# Patient Record
Sex: FEMALE | Race: WHITE | NOT HISPANIC OR LATINO | Employment: OTHER | URBAN - METROPOLITAN AREA
[De-identification: names, ages, dates, MRNs, and addresses within clinical notes are randomized per-mention and may not be internally consistent; named-entity substitution may affect disease eponyms.]

---

## 2019-12-19 ENCOUNTER — HOSPITAL ENCOUNTER (OUTPATIENT)
Dept: SURGERY | Facility: HOSPITAL | Age: 64
Setting detail: HOSPITAL OUTPATIENT SURGERY
Discharge: HOME | End: 2019-12-19
Attending: OPHTHALMOLOGY

## 2021-04-14 ENCOUNTER — HOSPITAL ENCOUNTER (OUTPATIENT)
Dept: LAB | Facility: HOSPITAL | Age: 66
Discharge: HOME | End: 2021-04-14
Attending: PHYSICIAN ASSISTANT

## 2021-04-14 ENCOUNTER — OFFICE VISIT CONVERTED (OUTPATIENT)
Dept: FAMILY MEDICINE CLINIC | Facility: CLINIC | Age: 66
End: 2021-04-14
Attending: PHYSICIAN ASSISTANT

## 2021-04-14 ENCOUNTER — CONVERSION ENCOUNTER (OUTPATIENT)
Dept: FAMILY MEDICINE CLINIC | Facility: CLINIC | Age: 66
End: 2021-04-14

## 2021-04-14 LAB
25(OH)D3 SERPL-MCNC: 28.4 NG/ML (ref 30–100)
ALBUMIN SERPL-MCNC: 4.4 G/DL (ref 3.5–5)
ALBUMIN/GLOB SERPL: 1.5 {RATIO} (ref 1.4–2.6)
ALP SERPL-CCNC: 63 U/L (ref 43–160)
ALT SERPL-CCNC: 19 U/L (ref 10–40)
ANION GAP SERPL CALC-SCNC: 17 MMOL/L (ref 8–19)
APPEARANCE UR: ABNORMAL
AST SERPL-CCNC: 26 U/L (ref 15–50)
BASOPHILS # BLD AUTO: 0.03 10*3/UL (ref 0–0.2)
BASOPHILS NFR BLD AUTO: 0.4 % (ref 0–3)
BILIRUB SERPL-MCNC: 0.36 MG/DL (ref 0.2–1.3)
BILIRUB UR QL: NEGATIVE
BUN SERPL-MCNC: 17 MG/DL (ref 5–25)
BUN/CREAT SERPL: 18 {RATIO} (ref 6–20)
CALCIUM SERPL-MCNC: 9 MG/DL (ref 8.7–10.4)
CHLORIDE SERPL-SCNC: 102 MMOL/L (ref 99–111)
CHOLEST SERPL-MCNC: 153 MG/DL (ref 107–200)
CHOLEST/HDLC SERPL: 2.6 {RATIO} (ref 3–6)
COLOR UR: YELLOW
CONV ABS IMM GRAN: 0.05 10*3/UL (ref 0–0.2)
CONV BACTERIA: ABNORMAL
CONV CO2: 25 MMOL/L (ref 22–32)
CONV COLLECTION SOURCE (UA): ABNORMAL
CONV HYALINE CASTS IN URINE MICRO: ABNORMAL /[LPF]
CONV IMMATURE GRAN: 0.6 % (ref 0–1.8)
CONV TOTAL PROTEIN: 7.4 G/DL (ref 6.3–8.2)
CONV UROBILINOGEN IN URINE BY AUTOMATED TEST STRIP: 0.2 {EHRLICHU}/DL (ref 0.1–1)
CREAT UR-MCNC: 0.96 MG/DL (ref 0.5–0.9)
DEPRECATED RDW RBC AUTO: 45.3 FL (ref 36.4–46.3)
EOSINOPHIL # BLD AUTO: 0.05 10*3/UL (ref 0–0.7)
EOSINOPHIL # BLD AUTO: 0.6 % (ref 0–7)
ERYTHROCYTE [DISTWIDTH] IN BLOOD BY AUTOMATED COUNT: 15 % (ref 11.7–14.4)
EST. AVERAGE GLUCOSE BLD GHB EST-MCNC: 111 MG/DL
FOLATE SERPL-MCNC: >20 NG/ML (ref 4.8–20)
GFR SERPLBLD BASED ON 1.73 SQ M-ARVRAT: >60 ML/MIN/{1.73_M2}
GLOBULIN UR ELPH-MCNC: 3 G/DL (ref 2–3.5)
GLUCOSE SERPL-MCNC: 103 MG/DL (ref 65–99)
GLUCOSE UR QL: NEGATIVE MG/DL
HBA1C MFR BLD: 5.5 % (ref 3.5–5.7)
HCT VFR BLD AUTO: 39 % (ref 37–47)
HDLC SERPL-MCNC: 60 MG/DL (ref 40–60)
HGB BLD-MCNC: 11.7 G/DL (ref 12–16)
HGB UR QL STRIP: NEGATIVE
IRON SATN MFR SERPL: 13 % (ref 20–55)
IRON SERPL-MCNC: 50 UG/DL (ref 60–170)
KETONES UR QL STRIP: NEGATIVE MG/DL
LDLC SERPL CALC-MCNC: 78 MG/DL (ref 70–100)
LEUKOCYTE ESTERASE UR QL STRIP: ABNORMAL
LYMPHOCYTES # BLD AUTO: 1.83 10*3/UL (ref 1–5)
LYMPHOCYTES NFR BLD AUTO: 23.3 % (ref 20–45)
MCH RBC QN AUTO: 24.8 PG (ref 27–31)
MCHC RBC AUTO-ENTMCNC: 30 G/DL (ref 33–37)
MCV RBC AUTO: 82.6 FL (ref 81–99)
MONOCYTES # BLD AUTO: 0.5 10*3/UL (ref 0.2–1.2)
MONOCYTES NFR BLD AUTO: 6.4 % (ref 3–10)
NEUTROPHILS # BLD AUTO: 5.39 10*3/UL (ref 2–8)
NEUTROPHILS NFR BLD AUTO: 68.7 % (ref 30–85)
NITRITE UR QL STRIP: POSITIVE
NRBC CBCN: 0 % (ref 0–0.7)
OSMOLALITY SERPL CALC.SUM OF ELEC: 290 MOSM/KG (ref 273–304)
PH UR STRIP.AUTO: 6 [PH] (ref 5–8)
PLATELET # BLD AUTO: 255 10*3/UL (ref 130–400)
PMV BLD AUTO: 11.1 FL (ref 9.4–12.3)
POTASSIUM SERPL-SCNC: 4.5 MMOL/L (ref 3.5–5.3)
PROT UR QL: NEGATIVE MG/DL
RBC # BLD AUTO: 4.72 10*6/UL (ref 4.2–5.4)
RBC #/AREA URNS HPF: ABNORMAL /[HPF]
SODIUM SERPL-SCNC: 139 MMOL/L (ref 135–147)
SP GR UR: 1.02 (ref 1–1.03)
T4 FREE SERPL-MCNC: 1.4 NG/DL (ref 0.9–1.8)
TIBC SERPL-MCNC: 383 UG/DL (ref 245–450)
TRANSFERRIN SERPL-MCNC: 268 MG/DL (ref 250–380)
TRIGL SERPL-MCNC: 73 MG/DL (ref 40–150)
TSH SERPL-ACNC: 1.54 M[IU]/L (ref 0.27–4.2)
VIT B12 SERPL-MCNC: 380 PG/ML (ref 211–911)
VLDLC SERPL-MCNC: 15 MG/DL (ref 5–37)
WBC # BLD AUTO: 7.85 10*3/UL (ref 4.8–10.8)
WBC #/AREA URNS HPF: ABNORMAL /[HPF]

## 2021-04-17 LAB
AMPICILLIN SUSC ISLT: <=2
AMPICILLIN+SULBAC SUSC ISLT: <=2
BACTERIA UR CULT: ABNORMAL
CEFAZOLIN SUSC ISLT: <=4
CEFEPIME SUSC ISLT: <=0.12
CEFTAZIDIME SUSC ISLT: <=1
CEFTRIAXONE SUSC ISLT: <=0.25
CIPROFLOXACIN SUSC ISLT: <=0.25
ERTAPENEM SUSC ISLT: <=0.12
GENTAMICIN SUSC ISLT: <=1
LEVOFLOXACIN SUSC ISLT: <=0.12
NITROFURANTOIN SUSC ISLT: <=16
PIP+TAZO SUSC ISLT: <=4
TMP SMX SUSC ISLT: <=20
TOBRAMYCIN SUSC ISLT: <=1

## 2021-05-11 NOTE — H&P
History and Physical      Patient Name: Veda Peña   Patient ID: [id number]   Sex: Female   YOB: 1955    Primary Care Provider: Lina MACK   Referring Provider: Lina MACK    Visit Date: April 14, 2021    Provider: FREDERICK Mallory   Location: [facility]   Location Address: [address] [place] [address], [address]  [place], [place]  [id number]   Location Phone: [phone]          Chief Complaint  · New Patient-Establish Care       History Of Present Illness  Veda Peña is a 65 year old female who presents for evaluation and treatment of:      New Patient to establish care, previous PCP Dr Prince Eddy     HL: She is taking Simvastatin with good results. She denies leg cramps/pain and muscle weakness     Hypothyroidism: She is taking Levothyroxine with good results. She states her energy level is low and her sleep is not solid, she wakes up every 2-3hrs.     COPD: She is currently using Albuterol Inhaler and states she mainly uses it in the summer. She does not see pulmonology     Neuropathy: She states she has it all over and states her Gabapentin and Hydrocodone helps. She will need referral to Pain Management.: pt requesting Dr. Lambert in [facility] [phone]. She states her PCP managed it before.     GERD: She is  taking Omeprazole with good results     CKD: pt unsure of level; no nephrologist.    MMG: [facility] 10/2020  Pap: about [facility]; s/p total hysterectomy secondary to fibroids; no h/o gyn cancer  CLN:2019 [place];   Last labs: 11/2020; last lab with elevated glucose of 137; pt monitoring diet.    PHQ-9: 8 with fatigue and weight gain noted. Pt had COVID in 2/2020 and states fatigue since; no hospitalization.       Past Medical History  Disease Name Date Onset Notes   COPD (chronic obstructive pulmonary disease) --  --    GERD (gastroesophageal reflux disease) --  --    Hyperlipidemia --  --     Hypothyroidism --  --    Neuropathic pain --  --          Past Surgical History  Procedure Name Date Notes   Cataract surgery of both eyes --  --    Gallbladder --  --    Hysterectomy (abdominal) --  --    Tubal ligation --  --          Medication List  Name Date Started Instructions   albuterol sulfate 90 mcg/actuation inhalation HFA aerosol inhaler  inhale 1 puff (90 mcg) by inhalation route every 6 hours as needed   gabapentin 300 mg oral capsule  take 1 capsule by oral route 2 times a day   hydrocodone-acetaminophen  mg oral tablet  take 2.5 tablets by oral route daily   levothyroxine 50 mcg oral capsule  take 1 capsule (50 mcg) by oral route once daily on an empty stomach 30 minutes before breakfast   omeprazole 20 mg oral capsule,delayed release(DR/EC)  take 1 capsule (20 mg) by oral route once daily before a meal   oxybutynin chloride 10 mg oral tablet extended release 24hr  take 2 tablets (20 mg) by oral route once daily   simvastatin 20 mg oral tablet  take 1 tablet (20 mg) by oral route once daily in the evening         Allergy List  Allergen Name Date Reaction Notes   Codeine Sulfate --  --  --    Valium --  --  --    venlafaxine --  --  --        Allergies Reconciled  Social History  Finding Status Start/Stop Quantity Notes   Tobacco Former --/-- --  44yrs 3/4PPD         Immunizations  NameDate Admin Mfg Trade Name Lot Number Route Inj VIS Given VIS Publication   InfluenzaRefused 04/14/2021 NE Not Entered  NE NE     Comments:          Review of Systems  · Constitutional  o Admits  o : fatigue, weight gain  o Denies  o : night sweats  · Eyes  o Denies  o : double vision, blurred vision  · HENT  o Denies  o : vertigo, recent head injury  · Breasts  o Denies  o : abnormal changes in breast size, additional breast symptoms except as noted in the HPI  · Cardiovascular  o Denies  o : chest pain, irregular heart beats  · Respiratory  o Denies  o : shortness of breath, productive  "cough  · Gastrointestinal  o Denies  o : nausea, vomiting  · Genitourinary  o Denies  o : dysuria, urinary retention  · Integument  o Denies  o : hair growth change, new skin lesions  · Neurologic  o Denies  o : altered mental status, seizures  · Musculoskeletal  o Denies  o : joint swelling, limitation of motion  · Endocrine  o Denies  o : cold intolerance, heat intolerance  · Heme-Lymph  o Denies  o : petechiae, lymph node enlargement or tenderness  · Allergic-Immunologic  o Denies  o : frequent illnesses      Vitals  Date Time BP Position Site L\R Cuff Size HR RR TEMP (F) WT  HT  BMI kg/m2 BSA m2 O2 Sat FR L/min FiO2 HC       04/14/2021 09:14 /58 Sitting    64 - R  97.7 146lbs 16oz 4'  10.5\" 30.2 1.66 97 %  21%          Physical Examination  · Constitutional  o Appearance  o : well developed, well-nourished, no acute distress  · Head and Face  o Head  o : normocephalic, atraumatic  · Neck  o Inspection/Palpation  o : normal appearance, no masses or tenderness, trachea midline  o Thyroid  o : gland size normal, nontender, no nodules or masses present on palpation  · Respiratory  o Respiratory Effort  o : breathing unlabored  o Inspection of Chest  o : chest rise symmetric bilaterally  o Auscultation of Lungs  o : clear to auscultation bilaterally throughout inspiration and expiration  · Cardiovascular  o Heart  o :   § Auscultation of Heart  § : regular rate and rhythm, no murmurs, gallops or rubs  o Peripheral Vascular System  o :   § Extremities  § : no edema  · Lymphatic  o Neck  o : no cervical lymphadenopathy, no supraclavicular lymphadenopathy  · Psychiatric  o Mood and Affect  o : mood normal, affect appropriate          Assessment  · Screening for depression     V79.0/Z13.89  · COPD (chronic obstructive pulmonary disease)     496/J44.9  · Fatigue     780.79/R53.83  · GERD (gastroesophageal reflux " disease)     530.81/K21.9  · Hyperlipidemia     272.4/E78.5  · Hypothyroidism     244.9/E03.9  · Establishing care with new doctor, encounter for     V65.8/Z76.89  · Patient receiving medication management services from refill clinic     V68.1/Z76.0  · History of 2019 novel coronavirus disease (COVID-19)     V12.09/Z86.16  · Elevated glucose     790.29/R73.09  · CKD (chronic kidney disease)     585.9/N18.9  · Neuropathy     355.9/G62.9  · Chronic pain     338.29/G89.29       Pt's chronic medical conditions are stable pending labs. Will order labs today. No refills needed per pt. Will obtain records for CLN and MMG. F/u 6mths. Will refer to Dr. Lambert in [place] for pain mgt.     Problems Reconciled  Plan  · Orders  o ACO-18: Positive screen for clinical depression using a standardized tool and a follow-up plan documented () - V79.0/Z13.89 - 04/14/2021   Checking labs for fatigue  o Iron panel (iron, TIBC, transferrin saturation) (34766, 68608, 50762) - 780.79/R53.83 - 04/14/2021  o B12 Folate levels (B12FO) - 780.79/R53.83 - 04/14/2021  o HTN/Lipid Panel (CMP, Lipid) [facility] (82170, 90421) - 272.4/E78.5 - 04/14/2021  o Thyroid Profile (THYII, 78462, 75359) - 244.9/E03.9 - 04/14/2021  o CBC with Auto Diff [facility] (75882) - 585.9/N18.9, 780.79/R53.83 - 04/14/2021  o Hgb A1c [facility] (23235) - 790.29/R73.09 - 04/14/2021  o Urinalysis with Reflex Microscopy ([facility]) (76305) - 585.9/N18.9 - 04/14/2021  o Vitamin D (25-Hydroxy) Level (50005) - 585.9/N18.9 - 04/14/2021  o ACO-14: Influenza immunization was not administered for reasons documented [facility] () - - 04/14/2021  o ACO-39: Current medications updated and reviewed (1159F, ) - - 04/14/2021  o PAIN MANAGEMENT CONSULTATION (PAINM) - 338.29/G89.29 - 04/14/2021   Dr Lambert in [place].  · Medications  o Medications have been Reconciled  o Transition of Care or Provider Policy  · Instructions  o Depression Screen completed and scanned into the EMR under the designated folder  within the patient's documents.  o Today's PHQ-9 result is 8  o Advised that cheeses and other sources of dairy fats, animal fats, fast food, and the extras (candy, pastries, pies, doughnuts and cookies) all contain LDL raising nutrients. Advised to increase fruits, vegetables, whole grains, and to monitor portion sizes.   o Patient is taking medications as prescribed and doing well.   o Patient was educated/instructed on their diagnosis, treatment and medications prior to discharge from the clinic today.  o Call the office with any concerns or questions.  o Chronic conditions reviewed and taken into consideration for today's treatment plan.  o Electronically Identified Patient Education Materials Provided Electronically  · Disposition  o Follow Up in 6 months.            Electronically Signed by: FREDERICK Mallory -Author on April 14, 2021 10:05:20 AM

## 2021-05-14 VITALS
SYSTOLIC BLOOD PRESSURE: 109 MMHG | OXYGEN SATURATION: 97 % | BODY MASS INDEX: 30.86 KG/M2 | HEIGHT: 58 IN | TEMPERATURE: 97.7 F | HEART RATE: 64 BPM | WEIGHT: 147 LBS | DIASTOLIC BLOOD PRESSURE: 58 MMHG

## 2021-05-20 ENCOUNTER — OFFICE VISIT CONVERTED (OUTPATIENT)
Dept: FAMILY MEDICINE CLINIC | Facility: CLINIC | Age: 66
End: 2021-05-20
Attending: PHYSICIAN ASSISTANT

## 2021-06-05 NOTE — PROGRESS NOTES
Progress Note      Patient Name: Veda Peña   Patient ID: [id number]   Sex: Female   YOB: 1955    Primary Care Provider: Lina MACK   Referring Provider: Lina MACK    Visit Date: May 20, 2021    Provider: FREDERICK Mallory   Location: [facility]   Location Address: [address] [place] [address], [address]  [place], [place]  [id number]   Location Phone: [phone]          Chief Complaint  · Right Knee Pain   · Follow up ER[facility]       History Of Present Illness  Veda Peña is a 65 year old female who presents for evaluation and treatment of:      Patient is here for follow up ER [facility] Sunday after bending down Saturday at home and heard a popping noise. She states it was fine until she woke up Sunday morning and it was swollen and painful. She was advised it was a possible torn ACL and to follow up with PCP. She has been doing rest, ice and wearing a brace, soaking in Epsom salt without relief of sx. Xray were done. Previous surgeries on right knee with meniscus repair; [facility], Dr Richey at [facility] did other surgery; last surgery about 15 years ago. Still with pain today. Went to Dr. Lambert in [facility] gave Tramadol and patient got nausea and vomiting with the medication. Per patient, he won't treat differently without consent of EMG and pt declines stating that she has had that before and it is painful and she declines to have test repeat. Doesn't want to go to Pain Mgt in [place].           Past Medical History  Disease Name Date Onset Notes   CKD (chronic kidney disease) 04/14/2021 --    COPD (chronic obstructive pulmonary disease) --  --    Elevated glucose 04/14/2021 --    GERD (gastroesophageal reflux disease) --  --    History of 2019 novel coronavirus disease (COVID-19) 04/14/2021 --    Hyperlipidemia --  --    Hypothyroidism --  --    Neuropathic pain --  --    Neuropathy 04/14/2021 --          Past Surgical History  Procedure  "Name Date Notes   Cataract surgery of both eyes --  --    Gallbladder --  --    Hysterectomy (abdominal) --  --    Tubal ligation --  --          Medication List  Name Date Started Instructions   albuterol sulfate 90 mcg/actuation inhalation HFA aerosol inhaler  inhale 1 puff (90 mcg) by inhalation route every 6 hours as needed   gabapentin 300 mg oral capsule  take 1 capsule by oral route 2 times a day   levothyroxine 50 mcg oral capsule  take 1 capsule (50 mcg) by oral route once daily on an empty stomach 30 minutes before breakfast   omeprazole 20 mg oral capsule,delayed release(DR/EC)  take 1 capsule (20 mg) by oral route once daily before a meal   oxybutynin chloride 10 mg oral tablet extended release 24hr  take 2 tablets (20 mg) by oral route once daily   simvastatin 20 mg oral tablet  take 1 tablet (20 mg) by oral route once daily in the evening         Allergy List  Allergen Name Date Reaction Notes   Codeine Sulfate --  --  --    Valium --  --  --    venlafaxine --  --  --        Allergies Reconciled  Social History  Finding Status Start/Stop Quantity Notes   Tobacco Former --/-- --  44yrs 3/4PPD         Immunizations  NameDate Admin Mfg Trade Name Lot Number Route Inj VIS Given VIS Publication   InfluenzaRefused 04/14/2021 NE Not Entered  NE NE     Comments:          Review of Systems  · Constitutional  o Denies  o : fever, fatigue, weight loss, weight gain  · Cardiovascular  o Denies  o : lower extremity edema, claudication, chest pressure, palpitations  · Respiratory  o Denies  o : shortness of breath, wheezing, cough, hemoptysis, dyspnea on exertion  · Gastrointestinal  o Denies  o : nausea, vomiting, diarrhea, constipation, abdominal pain  · Musculoskeletal  o Admits  o : joint pain, knee pain      Vitals  Date Time BP Position Site L\R Cuff Size HR RR TEMP (F) WT  HT  BMI kg/m2 BSA m2 O2 Sat FR L/min FiO2 HC       05/20/2021 11:57 /78 Sitting    65 - R  97.8 146lbs 4oz 4'  10.5\" 30.05 1.65 95 %  " 21%          Physical Examination  · Constitutional  o Appearance  o : well developed, well-nourished, no acute distress  · Head and Face  o Head  o : normocephalic, atraumatic  · Neck  o Inspection/Palpation  o : normal appearance, no masses or tenderness, trachea midline  o Thyroid  o : gland size normal, nontender, no nodules or masses present on palpation  · Respiratory  o Respiratory Effort  o : breathing unlabored  o Inspection of Chest  o : chest rise symmetric bilaterally  o Auscultation of Lungs  o : clear to auscultation bilaterally throughout inspiration and expiration  · Cardiovascular  o Heart  o :   § Auscultation of Heart  § : regular rate and rhythm, no murmurs, gallops or rubs  o Peripheral Vascular System  o :   § Extremities  § : no edema  · Lymphatic  o Neck  o : no cervical lymphadenopathy, no supraclavicular lymphadenopathy  · Psychiatric  o Mood and Affect  o : mood normal, affect appropriate  · Right Knee  o Inspection  o : no redness, swelling present   o Palpation  o : patellar tendon tenderness present, crepitus present   o Range of Motion  o : flexion normal 120 degrees, extension normal 0-5 degrees  o Special Tests  o : Anterior Drawer Test negative, Posterior Drawer Test negative, Varus Laxiety positive   o Neurovascular  o : neurovascularly intact              Assessment  · Chronic pain     338.29/G89.29  · Knee pain, right     719.46/M25.561  · Knee swelling     719.06/M25.469       Continue with brace and topical measures for knee; obtain MRI.  Referral to Pain Management for continued medication management.     Problems Reconciled  Plan  · Orders  o ACO-39: Current medications updated and reviewed (1159F, ) - - 05/20/2021  o PAIN MANAGEMENT CONSULTATION (PAINM) - 338.29/G89.29 - 05/20/2021   [facility]  o MRI knee right wo contrast (78864) - 719.46/M25.561, 719.06/M25.469 - 05/20/2021  · Medications  o Macrobid 100 mg oral capsule   SIG: take 1 capsule (100 mg) by oral route  every 12 hours with food for 7 days   DISP: (14) Capsule with 0 refills  Discontinued on 05/20/2021     o Medications have been Reconciled  o Transition of Care or Provider Policy  · Instructions  o Take all medications as prescribed/directed.  o Patient was educated/instructed on their diagnosis, treatment and medications prior to discharge from the clinic today.  o Call the office with any concerns or questions.  o Chronic conditions reviewed and taken into consideration for today's treatment plan.  o Electronically Identified Patient Education Materials Provided Electronically  · Disposition  o Call or Return if symptoms worsen or persist.  o Follow Up PRN.            Electronically Signed by: FREDERICK Mallory -Author on May 20, 2021 12:46:59 PM

## 2021-06-09 ENCOUNTER — TRANSCRIBE ORDERS (OUTPATIENT)
Dept: GENERAL RADIOLOGY | Facility: HOSPITAL | Age: 66
End: 2021-06-09

## 2021-06-09 ENCOUNTER — HOSPITAL ENCOUNTER (OUTPATIENT)
Dept: GENERAL RADIOLOGY | Facility: HOSPITAL | Age: 66
Discharge: HOME | End: 2021-06-09
Admitting: PHYSICIAN ASSISTANT

## 2021-06-09 DIAGNOSIS — M50.30 DEGENERATION OF CERVICAL INTERVERTEBRAL DISC: Primary | ICD-10-CM

## 2021-06-09 DIAGNOSIS — M50.30 DEGENERATION OF CERVICAL INTERVERTEBRAL DISC: ICD-10-CM

## 2021-06-09 PROCEDURE — 72040 X-RAY EXAM NECK SPINE 2-3 VW: CPT

## 2021-06-10 ENCOUNTER — TELEPHONE (OUTPATIENT)
Dept: FAMILY MEDICINE CLINIC | Facility: CLINIC | Age: 66
End: 2021-06-10

## 2021-06-11 ENCOUNTER — OFFICE VISIT (OUTPATIENT)
Dept: FAMILY MEDICINE CLINIC | Facility: CLINIC | Age: 66
End: 2021-06-11

## 2021-06-11 VITALS
DIASTOLIC BLOOD PRESSURE: 78 MMHG | SYSTOLIC BLOOD PRESSURE: 140 MMHG | HEART RATE: 61 BPM | HEIGHT: 58 IN | OXYGEN SATURATION: 99 % | BODY MASS INDEX: 31.53 KG/M2 | WEIGHT: 150.2 LBS

## 2021-06-11 DIAGNOSIS — N30.01 ACUTE CYSTITIS WITH HEMATURIA: ICD-10-CM

## 2021-06-11 DIAGNOSIS — E61.1 IRON DEFICIENCY: ICD-10-CM

## 2021-06-11 DIAGNOSIS — R35.0 URINARY FREQUENCY: ICD-10-CM

## 2021-06-11 DIAGNOSIS — R30.9 PAINFUL URINATION: Primary | ICD-10-CM

## 2021-06-11 PROBLEM — G62.9 NEUROPATHY: Status: ACTIVE | Noted: 2021-04-14

## 2021-06-11 PROBLEM — J44.9 COPD (CHRONIC OBSTRUCTIVE PULMONARY DISEASE): Status: ACTIVE | Noted: 2021-06-11

## 2021-06-11 PROBLEM — M79.2 NEUROPATHIC PAIN: Status: RESOLVED | Noted: 2021-06-11 | Resolved: 2021-06-11

## 2021-06-11 PROBLEM — E78.5 HYPERLIPIDEMIA: Status: ACTIVE | Noted: 2021-06-11

## 2021-06-11 PROBLEM — M79.2 NEUROPATHIC PAIN: Status: ACTIVE | Noted: 2021-06-11

## 2021-06-11 PROBLEM — Z86.16 HISTORY OF 2019 NOVEL CORONAVIRUS DISEASE (COVID-19): Status: ACTIVE | Noted: 2021-04-14

## 2021-06-11 PROBLEM — R73.09 ELEVATED GLUCOSE: Status: ACTIVE | Noted: 2021-04-14

## 2021-06-11 PROBLEM — N18.9 CHRONIC KIDNEY DISEASE: Status: ACTIVE | Noted: 2021-04-14

## 2021-06-11 PROBLEM — E03.9 HYPOTHYROIDISM: Status: ACTIVE | Noted: 2021-06-11

## 2021-06-11 PROBLEM — K21.9 GERD (GASTROESOPHAGEAL REFLUX DISEASE): Status: ACTIVE | Noted: 2021-06-11

## 2021-06-11 LAB
BILIRUB BLD-MCNC: NEGATIVE MG/DL
CLARITY, POC: ABNORMAL
COLOR UR: YELLOW
GLUCOSE UR STRIP-MCNC: NEGATIVE MG/DL
KETONES UR QL: NEGATIVE
LEUKOCYTE EST, POC: ABNORMAL
NITRITE UR-MCNC: NEGATIVE MG/ML
PH UR: 7 [PH] (ref 5–8)
PROT UR STRIP-MCNC: NEGATIVE MG/DL
RBC # UR STRIP: ABNORMAL /UL
SP GR UR: 1.02 (ref 1–1.03)
UROBILINOGEN UR QL: NORMAL

## 2021-06-11 PROCEDURE — 87186 SC STD MICRODIL/AGAR DIL: CPT | Performed by: PHYSICIAN ASSISTANT

## 2021-06-11 PROCEDURE — 81003 URINALYSIS AUTO W/O SCOPE: CPT | Performed by: PHYSICIAN ASSISTANT

## 2021-06-11 PROCEDURE — 87077 CULTURE AEROBIC IDENTIFY: CPT | Performed by: PHYSICIAN ASSISTANT

## 2021-06-11 PROCEDURE — 99213 OFFICE O/P EST LOW 20 MIN: CPT | Performed by: PHYSICIAN ASSISTANT

## 2021-06-11 PROCEDURE — 87086 URINE CULTURE/COLONY COUNT: CPT | Performed by: PHYSICIAN ASSISTANT

## 2021-06-11 RX ORDER — NITROFURANTOIN 25; 75 MG/1; MG/1
100 CAPSULE ORAL 2 TIMES DAILY
Qty: 14 CAPSULE | Refills: 0 | Status: SHIPPED | OUTPATIENT
Start: 2021-06-11 | End: 2021-06-18

## 2021-06-11 RX ORDER — LEVOTHYROXINE SODIUM 50 UG/1
CAPSULE ORAL
COMMUNITY
End: 2021-07-01 | Stop reason: SDUPTHER

## 2021-06-11 RX ORDER — SIMVASTATIN 20 MG
TABLET ORAL
COMMUNITY
End: 2021-07-01 | Stop reason: SDUPTHER

## 2021-06-11 RX ORDER — OMEPRAZOLE 20 MG/1
CAPSULE, DELAYED RELEASE ORAL
COMMUNITY
End: 2021-07-01 | Stop reason: SDUPTHER

## 2021-06-11 RX ORDER — OXYBUTYNIN CHLORIDE 10 MG/1
TABLET, EXTENDED RELEASE ORAL
COMMUNITY
End: 2021-07-01 | Stop reason: SDUPTHER

## 2021-06-11 RX ORDER — HYDROCODONE BITARTRATE AND ACETAMINOPHEN 10; 325 MG/1; MG/1
TABLET ORAL
COMMUNITY

## 2021-06-11 RX ORDER — ALBUTEROL SULFATE 90 UG/1
AEROSOL, METERED RESPIRATORY (INHALATION)
COMMUNITY
End: 2021-12-06 | Stop reason: SDUPTHER

## 2021-06-11 NOTE — PROGRESS NOTES
"Chief Complaint  Chief Complaint   Patient presents with   • Urinary Frequency   • Difficulty Urinating     burning and throbbing since monday       Subjective          Veda Peña presents to [facility]  History of Present Illness   SUBJECTIVE: Veda Peña is a 66 y.o. female who complains of urinary frequency, urgency and dysuria x 5  days, without flank pain, fever, chills, or abnormal vaginal discharge or bleeding.     Medical History: has a past medical history of CKD (chronic kidney disease), COPD (chronic obstructive pulmonary disease) (CMS/[facility]), GERD (gastroesophageal reflux disease), Hyperlipidemia, Hyperthyroidism, and Neuropathy.   Surgical History: has a past surgical history that includes Anterior cruciate ligament repair; Hysterectomy; Tubal ligation; and Gallbladder surgery.   Family History: family history includes Cancer in her father, maternal grandmother, mother, and sister.   Social History: reports that she has quit smoking. Her smoking use included cigarettes. She has a 80.00 pack-year smoking history. She has never used smokeless tobacco. She reports that she does not drink alcohol and does not use drugs.    Allergies: Tramadol, Venlafaxine, Codeine, and Diazepam    Health Maintenance Due   Topic Date Due   • MAMMOGRAM  Never done   • DXA SCAN  Never done   • COLORECTAL CANCER SCREENING  Never done   • COVID-19 Vaccine (1) Never done   • TDAP/TD VACCINES (1 - Tdap) Never done   • ZOSTER VACCINE (1 of 2) Never done   • Pneumococcal Vaccine 65+ (1 of 1 - PPSV23) Never done   • HEPATITIS C SCREENING  Never done   • ANNUAL WELLNESS VISIT  Never done         There is no immunization history on file for this patient.    Objective     Vitals:    06/11/21 1120   BP: 140/78   Pulse: 61   SpO2: 99%   Weight: 68.1 kg (150 lb 3.2 oz)   Height: 147.3 cm (58\")     Body mass index is 31.39 kg/m².       Physical Exam  Vitals and nursing note reviewed.   Constitutional:  "      Appearance: Normal appearance.   HENT:      Head: Normocephalic and atraumatic.   Neck:      Vascular: No carotid bruit.      Comments: Thyroid : gland size normal, nontender, no nodules or masses present on palpation   Cardiovascular:      Rate and Rhythm: Normal rate and regular rhythm.      Pulses: Normal pulses.      Heart sounds: Normal heart sounds.   Pulmonary:      Effort: Pulmonary effort is normal.      Breath sounds: Normal breath sounds.   Abdominal:      General: Abdomen is flat. Bowel sounds are normal.      Palpations: Abdomen is soft.      Tenderness: There is abdominal tenderness. There is no right CVA tenderness or left CVA tenderness.   Musculoskeletal:      Cervical back: Neck supple. No tenderness.      Right lower leg: No edema.      Left lower leg: No edema.   Lymphadenopathy:      Cervical: No cervical adenopathy.   Neurological:      Mental Status: She is alert.   Psychiatric:         Mood and Affect: Mood normal.         Behavior: Behavior normal.           Result Review :                    Office Visit on 06/11/2021   Component Date Value Ref Range Status   • Color 06/11/2021 Yellow  Yellow, Straw, Dark Yellow, Jaki Final   • Clarity, UA 06/11/2021 Turbid* Clear Final   • Specific Gravity  06/11/2021 1.025  1.005 - 1.030 Final   • pH, Urine 06/11/2021 7.0  5.0 - 8.0 Final   • Leukocytes 06/11/2021 Large (3+)* Negative Final   • Nitrite, UA 06/11/2021 Negative  Negative Final   • Protein, POC 06/11/2021 Negative  Negative mg/dL Final   • Glucose, UA 06/11/2021 Negative  Negative, 1000 mg/dL (3+) mg/dL Final   • Ketones, UA 06/11/2021 Negative  Negative Final   • Urobilinogen, UA 06/11/2021 Normal  Normal Final   • Bilirubin 06/11/2021 Negative  Negative Final   • Blood, UA 06/11/2021 250 Julien/ul* Negative Final        Assessment and Plan      Diagnoses and all orders for this visit:    1. Painful urination (Primary)  -     POCT urinalysis dipstick, automated    2. Acute cystitis with  hematuria  Comments:  Increase water; take Macrobid; culture pending. F/u if sx persist.  Orders:  -     Urine Culture - Urine, Urine, Clean Catch; Future  -     nitrofurantoin, macrocrystal-monohydrate, (Macrobid) 100 MG capsule; Take 1 capsule by mouth 2 (Two) Times a Day for 7 days.  Dispense: 14 capsule; Refill: 0  -     Urine Culture - Urine, Urine, Clean Catch    3. Iron deficiency  Comments:  Ok to d/c iron; trial of Multivitamin with iron.    4. Urinary frequency  -     POCT urinalysis dipstick, automated            Follow Up     Return if symptoms worsen or fail to improve.    Patient was given instructions and counseling regarding her condition or for health maintenance advice. Please see specific information pulled into the AVS if appropriate.

## 2021-06-11 NOTE — PATIENT INSTRUCTIONS
Patient was educated/instructed on their diagnosis, treatment and medications prior to discharge from the clinic today. Patient advised to call the office with any questions or concerns.

## 2021-06-13 LAB — BACTERIA SPEC AEROBE CULT: ABNORMAL

## 2021-06-25 ENCOUNTER — TELEPHONE (OUTPATIENT)
Dept: FAMILY MEDICINE CLINIC | Facility: CLINIC | Age: 66
End: 2021-06-25

## 2021-06-25 ENCOUNTER — OFFICE VISIT (OUTPATIENT)
Dept: FAMILY MEDICINE CLINIC | Facility: CLINIC | Age: 66
End: 2021-06-25

## 2021-06-25 ENCOUNTER — OFFICE VISIT (OUTPATIENT)
Dept: ORTHOPEDIC SURGERY | Facility: CLINIC | Age: 66
End: 2021-06-25

## 2021-06-25 VITALS
HEIGHT: 59 IN | WEIGHT: 151.13 LBS | TEMPERATURE: 97.8 F | OXYGEN SATURATION: 97 % | SYSTOLIC BLOOD PRESSURE: 112 MMHG | HEART RATE: 59 BPM | DIASTOLIC BLOOD PRESSURE: 61 MMHG | BODY MASS INDEX: 30.47 KG/M2

## 2021-06-25 VITALS — HEART RATE: 82 BPM | OXYGEN SATURATION: 97 % | WEIGHT: 151.8 LBS | HEIGHT: 59 IN | BODY MASS INDEX: 30.6 KG/M2

## 2021-06-25 DIAGNOSIS — R30.0 DYSURIA: ICD-10-CM

## 2021-06-25 DIAGNOSIS — S89.91XA INJURY OF RIGHT KNEE, INITIAL ENCOUNTER: Primary | ICD-10-CM

## 2021-06-25 DIAGNOSIS — M25.561 RIGHT KNEE PAIN, UNSPECIFIED CHRONICITY: ICD-10-CM

## 2021-06-25 DIAGNOSIS — N30.00 ACUTE CYSTITIS WITHOUT HEMATURIA: Primary | ICD-10-CM

## 2021-06-25 DIAGNOSIS — M25.562 LEFT KNEE PAIN, UNSPECIFIED CHRONICITY: ICD-10-CM

## 2021-06-25 PROBLEM — M50.30 DDD (DEGENERATIVE DISC DISEASE), CERVICAL: Status: ACTIVE | Noted: 2021-06-09

## 2021-06-25 LAB
BILIRUB BLD-MCNC: NEGATIVE MG/DL
CLARITY, POC: CLEAR
COLOR UR: YELLOW
GLUCOSE UR STRIP-MCNC: NEGATIVE MG/DL
KETONES UR QL: NEGATIVE
LEUKOCYTE EST, POC: ABNORMAL
NITRITE UR-MCNC: NEGATIVE MG/ML
PH UR: 7 [PH] (ref 5–8)
PROT UR STRIP-MCNC: NEGATIVE MG/DL
RBC # UR STRIP: NEGATIVE /UL
SP GR UR: 1.02 (ref 1–1.03)
UROBILINOGEN UR QL: NORMAL

## 2021-06-25 PROCEDURE — 99213 OFFICE O/P EST LOW 20 MIN: CPT | Performed by: PHYSICIAN ASSISTANT

## 2021-06-25 PROCEDURE — 87086 URINE CULTURE/COLONY COUNT: CPT | Performed by: PHYSICIAN ASSISTANT

## 2021-06-25 PROCEDURE — 99204 OFFICE O/P NEW MOD 45 MIN: CPT | Performed by: ORTHOPAEDIC SURGERY

## 2021-06-25 PROCEDURE — 81003 URINALYSIS AUTO W/O SCOPE: CPT | Performed by: PHYSICIAN ASSISTANT

## 2021-06-25 RX ORDER — NITROFURANTOIN 25; 75 MG/1; MG/1
100 CAPSULE ORAL 2 TIMES DAILY
Qty: 14 CAPSULE | Refills: 0 | Status: SHIPPED | OUTPATIENT
Start: 2021-06-25 | End: 2021-11-04

## 2021-06-25 RX ORDER — GABAPENTIN 300 MG/1
1 CAPSULE ORAL 2 TIMES DAILY
COMMUNITY
Start: 2021-06-14

## 2021-06-25 NOTE — PROGRESS NOTES
"Chief Complaint  Initial Evaluation and Pain of the Right Knee     Subjective      Veda Peña presents to [facility] for an evaluation of right knee. Patient was was pulling weeds out of her flowerbed and when she went to kneel down, she felt a snap in her knee resulting in immediate pain and swelling. She states this happened 6 weeks ago. She states she has a history of her meniscus being removed. Prior to the injury, she was having mild pain in her right knee. She states that the following day she was unable to fully straighten her knee. She went to the ED and was told she may have torn her ACL. She states that she does have some instability in her knee. She doesn't typically use a cane for ambulation assistance. Patient has a kidney disease and can't takes NSAIDs.      Allergies   Allergen Reactions   • Tramadol Nausea And Vomiting   • Codeine Palpitations   • Diazepam Anxiety   • Venlafaxine Nausea And Vomiting        Social History     Socioeconomic History   • Marital status:      Spouse name: Not on file   • Number of children: Not on file   • Years of education: Not on file   • Highest education level: Not on file   Tobacco Use   • Smoking status: Former Smoker     Packs/day: 2.00     Years: 40.00     Pack years: 80.00     Types: Cigarettes   • Smokeless tobacco: Never Used   Vaping Use   • Vaping Use: Every day   • Substances: Flavoring   • Devices: Pre-filled or refillable cartridge   Substance and Sexual Activity   • Alcohol use: Never   • Drug use: Never        Review of Systems     Objective   Vital Signs:   Pulse 82   Ht 149.9 cm (59\")   Wt 68.9 kg (151 lb 12.8 oz)   SpO2 97%   BMI 30.66 kg/m²       Physical Exam  Constitutional:       Appearance: Normal appearance. He is well-developed and normal weight.   HENT:      Head: Normocephalic.      Right Ear: Hearing and external ear normal.      Left Ear: Hearing and external ear normal.      Nose: Nose normal. "   Eyes:      Conjunctiva/sclera: Conjunctivae normal.   Cardiovascular:      Rate and Rhythm: Normal rate.   Pulmonary:      Effort: Pulmonary effort is normal.      Breath sounds: No wheezing or rales.   Abdominal:      Palpations: Abdomen is soft.      Tenderness: There is no abdominal tenderness.   Musculoskeletal:      Cervical back: Normal range of motion.   Skin:     Findings: No rash.   Neurological:      Mental Status: He is alert and oriented to person, place, and time.   Psychiatric:         Mood and Affect: Mood and affect normal.         Judgment: Judgment normal.       Ortho Exam      RIGHT KNEE: Ambulation with a cane. Limping gait. Dorsal Pedal Pulse 2+, posteriror tibialis pulse 2+. Swelling. -5 degrees of extension. Flexion to 110 degrees. Tender patella tendon. Calf supple, non-tender. Good strength to hamstrings, quadriceps, dorsiflexors and plantar flexors. Pain with Apley's testing. Pain with Michelle's testing. Skin intact. Pain with Lachman.       Procedures      Imaging Results (Most Recent)     Procedure Component Value Units Date/Time    XR Knee 3 View Right [[id number]] Resulted: 06/25/21 1047     Updated: 06/25/21 1048    Narrative:      X-Ray Report:  Right knee(s) X-Ray  Indication: Evaluation of right knee  AP, Lateral and Standing view(s)  Findings: There are degenerative changes of the right knee which is   consistent with the diagnosis of right knee osteoarthritis. No fracture or   dislocation.   Prior studies available for comparison: no            Result Review :       X-Ray Report:  Right knee(s) X-Ray  Indication: Evaluation of right knee  AP, Lateral and Standing view(s)  Findings: There are degenerative changes of the right knee which is consistent with the diagnosis of right knee osteoarthritis. No fracture or dislocation.   Prior studies available for comparison: no     Assessment and Plan     DX: Right knee injury     Discussed treatment plans and diagnosis with the patient.  She can't take NSAIDs due to kidney disease. She has been taking Tylenol. Patient was given a prescription for PT.     Call or return if worsening symptoms.    Follow Up     4-6 weeks.       Patient was given instructions and counseling regarding her condition or for health maintenance advice. Please see specific information pulled into the AVS if appropriate.     Scribed for Agustina Leiva MD by Megan James.  06/25/21   08:12 EDT    I have personally performed the services described in this document as scribed by the above individual and it is both accurate and complete.  Agustina Leiva MD 06/25/21  08:12 EDT

## 2021-06-25 NOTE — TELEPHONE ENCOUNTER
Caller: Veda Peña    Relationship: Self    Best call back number: [phone]     What medication are you requesting: ANTIBIOTIC    What are your current symptoms: URGENCY TO URINATE, SLOW STREAM WHEN TRYING TO URINATE, PAIN AND THROBBING ASSOCIATED WITH URINATING    How long have you been experiencing symptoms: 2 DAYS    Have you had these symptoms before:    [x] Yes  [] No    Have you been treated for these symptoms before:   [x] Yes  [] No    If a prescription is needed, what is your preferred pharmacy and phone number:    [facility] ([place] - [place] [address] [address]  - [phone] [facility] [phone]       Additional notes: PATIENT STATES THAT SHE HAS BEEN TREATED TWICE BY PCP IN A MONTH AND A HALF FOR UTI ISSUES.

## 2021-06-25 NOTE — PROGRESS NOTES
Chief Complaint  Chief Complaint   Patient presents with   • Difficulty Urinating     painful urination and urgency        Subjective          Veda Peña presents to [facility]  Urinary Tract Infection   This is a recurrent problem. The current episode started yesterday. The problem occurs every urination. The problem has been gradually worsening. The quality of the pain is described as aching and burning. The pain is at a severity of 4/10. The pain is mild. There has been no fever. Associated symptoms include urgency. She has tried nothing for the symptoms.       Medical History: has a past medical history of CKD (chronic kidney disease), COPD (chronic obstructive pulmonary disease) (CMS/[facility]), Elevated glucose (04/14/2021), GERD (gastroesophageal reflux disease), History of 2019 novel coronavirus disease (COVID-19) (04/14/2021), Hyperlipidemia, Hyperthyroidism, Neuropathic pain, and Neuropathy.   Surgical History: has a past surgical history that includes Anterior cruciate ligament repair; Hysterectomy; Tubal ligation; Gallbladder surgery; and Eye surgery.   Family History: family history includes Cancer in her father, maternal grandmother, mother, and sister.   Social History: reports that she has quit smoking. Her smoking use included cigarettes. She has a 80.00 pack-year smoking history. She has never used smokeless tobacco. She reports that she does not drink alcohol and does not use drugs.    Allergies: Tramadol, Codeine, Diazepam, and Venlafaxine    Health Maintenance Due   Topic Date Due   • DXA SCAN  Never done   • COVID-19 Vaccine (1) Never done   • TDAP/TD VACCINES (1 - Tdap) Never done   • ZOSTER VACCINE (1 of 2) Never done   • Pneumococcal Vaccine 65+ (1 of 1 - PPSV23) Never done   • MAMMOGRAM  11/19/2020   • HEPATITIS C SCREENING  Never done   • ANNUAL WELLNESS VISIT  Never done         There is no immunization history on file for this patient.    Objective  "    Vitals:    06/25/21 1205   BP: 112/61   Pulse: 59   Temp: 97.8 °F (36.6 °C)   TempSrc: Oral   SpO2: 97%   Weight: 68.5 kg (151 lb 2 oz)   Height: 149.9 cm (59\")     Body mass index is 30.52 kg/m².       Physical Exam  Vitals and nursing note reviewed.   Constitutional:       Appearance: Normal appearance.   HENT:      Head: Normocephalic and atraumatic.   Neck:      Vascular: No carotid bruit.      Comments: Thyroid : gland size normal, nontender, no nodules or masses present on palpation   Cardiovascular:      Rate and Rhythm: Normal rate and regular rhythm.      Pulses: Normal pulses.      Heart sounds: Normal heart sounds.   Pulmonary:      Effort: Pulmonary effort is normal.      Breath sounds: Normal breath sounds.   Abdominal:      General: Abdomen is flat.      Palpations: Abdomen is soft.      Tenderness: There is abdominal tenderness. There is no right CVA tenderness or left CVA tenderness.   Musculoskeletal:      Cervical back: Neck supple. No tenderness.      Right lower leg: No edema.      Left lower leg: No edema.   Lymphadenopathy:      Cervical: No cervical adenopathy.   Neurological:      Mental Status: She is alert.   Psychiatric:         Mood and Affect: Mood normal.         Behavior: Behavior normal.           Result Review :                           Assessment and Plan      Diagnoses and all orders for this visit:    1. Acute cystitis without hematuria (Primary)  Comments:  Culture pending. Macrobid 100mg bid x 7 days. Discussed hygeine. Last UTIs have been after episodes of diarrhea. Consider probiotic. F/u if sx persist.  Orders:  -     nitrofurantoin, macrocrystal-monohydrate, (Macrobid) 100 MG capsule; Take 1 capsule by mouth 2 (Two) Times a Day.  Dispense: 14 capsule; Refill: 0    2. Dysuria  -     POCT urinalysis dipstick, automated  -     Urine Culture - Urine, Urine, Clean Catch            Follow Up     Return if symptoms worsen or fail to improve.    Patient was given instructions " and counseling regarding her condition or for health maintenance advice. Please see specific information pulled into the AVS if appropriate.

## 2021-06-26 LAB — BACTERIA SPEC AEROBE CULT: NO GROWTH

## 2021-07-01 RX ORDER — OXYBUTYNIN CHLORIDE 10 MG/1
10 TABLET, EXTENDED RELEASE ORAL DAILY
Qty: 90 TABLET | Refills: 0 | Status: SHIPPED | OUTPATIENT
Start: 2021-07-01 | End: 2021-10-06 | Stop reason: SDUPTHER

## 2021-07-01 RX ORDER — SIMVASTATIN 20 MG
20 TABLET ORAL NIGHTLY
Qty: 90 TABLET | Refills: 0 | Status: SHIPPED | OUTPATIENT
Start: 2021-07-01 | End: 2021-09-17 | Stop reason: SDUPTHER

## 2021-07-01 RX ORDER — LEVOTHYROXINE SODIUM 50 UG/1
50 CAPSULE ORAL DAILY
Qty: 90 CAPSULE | Refills: 0 | Status: SHIPPED | OUTPATIENT
Start: 2021-07-01 | End: 2021-10-06 | Stop reason: SDUPTHER

## 2021-07-01 RX ORDER — OMEPRAZOLE 20 MG/1
20 CAPSULE, DELAYED RELEASE ORAL DAILY
Qty: 90 CAPSULE | Refills: 0 | Status: SHIPPED | OUTPATIENT
Start: 2021-07-01 | End: 2021-10-06 | Stop reason: SDUPTHER

## 2021-07-01 NOTE — TELEPHONE ENCOUNTER
Caller: Casey Veda L    Relationship: Self    Best call back number: [phone]    Medication needed:   Requested Prescriptions     Pending Prescriptions Disp Refills   • levothyroxine sodium (TIROSINT) 50 MCG capsule 30 capsule    • omeprazole (priLOSEC) 20 MG capsule     • oxybutynin XL (DITROPAN-XL) 10 MG 24 hr tablet     • simvastatin (ZOCOR) 20 MG tablet         When do you need the refill by: PATIENT HAS ONE LEFT LEFT ON SCRIPTS    What additional details did the patient provide when requesting the medication: PATIENT WAS INFORMED BY [facility] THAT THEY WOULD NOT HONOR HER REFILLS.      Does the patient have less than a 3 day supply:  [] Yes  [x] No    What is the patient's preferred pharmacy: [facility] ([place]) - [place] [address] [address] [facility] [address] - [phone] [facility] [phone] FX

## 2021-07-15 VITALS
TEMPERATURE: 97.8 F | BODY MASS INDEX: 30.7 KG/M2 | WEIGHT: 146.25 LBS | HEART RATE: 65 BPM | SYSTOLIC BLOOD PRESSURE: 132 MMHG | OXYGEN SATURATION: 95 % | DIASTOLIC BLOOD PRESSURE: 78 MMHG | HEIGHT: 58 IN

## 2021-08-03 ENCOUNTER — OFFICE VISIT (OUTPATIENT)
Dept: ORTHOPEDIC SURGERY | Facility: CLINIC | Age: 66
End: 2021-08-03

## 2021-08-03 VITALS — HEIGHT: 59 IN | BODY MASS INDEX: 30.44 KG/M2 | OXYGEN SATURATION: 93 % | WEIGHT: 151 LBS | HEART RATE: 73 BPM

## 2021-08-03 DIAGNOSIS — M25.561 RIGHT KNEE PAIN, UNSPECIFIED CHRONICITY: ICD-10-CM

## 2021-08-03 DIAGNOSIS — M17.11 PRIMARY LOCALIZED OSTEOARTHROSIS OF RIGHT LOWER LEG: Primary | ICD-10-CM

## 2021-08-03 PROCEDURE — 99213 OFFICE O/P EST LOW 20 MIN: CPT | Performed by: PHYSICIAN ASSISTANT

## 2021-08-03 NOTE — PROGRESS NOTES
"Chief Complaint  Follow-up and Pain of the Right Knee    Subjective          Veda Peña presents to [facility] for follow-up on right knee pain.  Several months ago the patient the patient was pulling weeds out of her flower bed, kneel down, felt a sharp pain in her knee and had significant pain and swelling thereafter.  At her last visit 6/25/2021 the patient was prescribed in order physical therapy, she has been going a few times a week and states that her knee pain is practically nonexistent.  She no longer has morning stiffness or symptoms of restless leg syndrome.  She states she is very happy with her range of motion, still has a small limp when she walks but has been able to get rid of the cane that she was using for ambulation.  She takes gabapentin and hydrocodone for pain relief.    Objective   Vital Signs:   Pulse 73   Ht 149.9 cm (59\")   Wt 68.5 kg (151 lb)   SpO2 93%   BMI 30.50 kg/m²       Physical Exam  Constitutional:       Appearance: Normal appearance. He is well-developed and normal weight.   HENT:      Head: Normocephalic.      Right Ear: Hearing and external ear normal.      Left Ear: Hearing and external ear normal.      Nose: Nose normal.   Eyes:      Conjunctiva/sclera: Conjunctivae normal.   Cardiovascular:      Rate and Rhythm: Normal rate.   Pulmonary:      Effort: Pulmonary effort is normal.      Breath sounds: No wheezing or rales.   Abdominal:      Palpations: Abdomen is soft.      Tenderness: There is no abdominal tenderness.   Musculoskeletal:      Cervical back: Normal range of motion.   Skin:     Findings: No rash.   Neurological:      Mental Status: He is alert and oriented to person, place, and time.   Psychiatric:         Mood and Affect: Mood and affect normal.         Judgment: Judgment normal.     Ortho Exam  Right knee: No swelling, skin intact, no tenderness to palpation, full flexion and extension, stable to varus valgus stress, crepitus " with range of motion, mildly antalgic gait, sensation to light touch intact in the lower extremities, posterior tibialis pulses 2+, good range of motion right ankle and digits.  Result Review :            Imaging Results (Most Recent)     None                Assessment and Plan    Problem List Items Addressed This Visit        Musculoskeletal and Injuries    Primary localized osteoarthrosis of right lower leg - Primary    Current Assessment & Plan     Patient has 2 more physical therapy visit she plans to complete and she will do home exercises thereafter.  She would like to follow-up with us on an as-needed basis.         Right knee pain          Follow Up   Return if symptoms worsen or fail to improve.  Patient Instructions   Follow-up as needed, continue home exercises.    Patient was given instructions and counseling regarding her condition or for health maintenance advice. Please see specific information pulled into the AVS if appropriate.

## 2021-08-03 NOTE — ASSESSMENT & PLAN NOTE
Patient has 2 more physical therapy visit she plans to complete and she will do home exercises thereafter.  She would like to follow-up with us on an as-needed basis.

## 2021-09-17 DIAGNOSIS — E78.5 HYPERLIPIDEMIA, UNSPECIFIED HYPERLIPIDEMIA TYPE: Primary | ICD-10-CM

## 2021-09-17 RX ORDER — SIMVASTATIN 20 MG
20 TABLET ORAL NIGHTLY
Qty: 90 TABLET | Refills: 0 | Status: SHIPPED | OUTPATIENT
Start: 2021-09-17 | End: 2021-11-12 | Stop reason: SDUPTHER

## 2021-10-06 DIAGNOSIS — E03.9 HYPOTHYROIDISM, UNSPECIFIED TYPE: ICD-10-CM

## 2021-10-06 DIAGNOSIS — N32.81 OAB (OVERACTIVE BLADDER): ICD-10-CM

## 2021-10-06 DIAGNOSIS — K21.9 GASTROESOPHAGEAL REFLUX DISEASE, UNSPECIFIED WHETHER ESOPHAGITIS PRESENT: Primary | ICD-10-CM

## 2021-10-06 RX ORDER — OXYBUTYNIN CHLORIDE 10 MG/1
10 TABLET, EXTENDED RELEASE ORAL DAILY
Qty: 90 TABLET | Refills: 0 | Status: SHIPPED | OUTPATIENT
Start: 2021-10-06 | End: 2021-11-12 | Stop reason: SDUPTHER

## 2021-10-06 RX ORDER — LEVOTHYROXINE SODIUM 50 UG/1
50 CAPSULE ORAL DAILY
Qty: 90 CAPSULE | Refills: 0 | Status: SHIPPED | OUTPATIENT
Start: 2021-10-06 | End: 2021-11-12 | Stop reason: SDUPTHER

## 2021-10-06 RX ORDER — OMEPRAZOLE 20 MG/1
20 CAPSULE, DELAYED RELEASE ORAL DAILY
Qty: 90 CAPSULE | Refills: 0 | Status: CANCELLED | OUTPATIENT
Start: 2021-10-06

## 2021-10-06 RX ORDER — LEVOTHYROXINE SODIUM 50 UG/1
50 CAPSULE ORAL DAILY
Qty: 90 CAPSULE | Refills: 0 | Status: CANCELLED | OUTPATIENT
Start: 2021-10-06

## 2021-10-06 RX ORDER — OXYBUTYNIN CHLORIDE 10 MG/1
10 TABLET, EXTENDED RELEASE ORAL DAILY
Qty: 90 TABLET | Refills: 0 | Status: CANCELLED | OUTPATIENT
Start: 2021-10-06

## 2021-10-06 RX ORDER — OMEPRAZOLE 20 MG/1
20 CAPSULE, DELAYED RELEASE ORAL DAILY
Qty: 90 CAPSULE | Refills: 0 | Status: SHIPPED | OUTPATIENT
Start: 2021-10-06 | End: 2021-11-12 | Stop reason: SDUPTHER

## 2021-10-06 NOTE — TELEPHONE ENCOUNTER
Caller: Veda Peña    Relationship: Self      Medication requested (name and dosage):   levothyroxine sodium (TIROSINT) 50 MCG capsule  omeprazole (priLOSEC) 20 MG capsule  oxybutynin XL (DITROPAN-XL) 10 MG 24 hr tablet    Pharmacy where request should be sent: [facility] ([place]) - [address] - [phone] [facility] [phone] FX    Best call back number: [phone]    Does the patient have less than a 3 day supply:  [] Yes  [x] No    Jie Dexter Rep   10/06/21 09:14 EDT

## 2021-11-04 ENCOUNTER — OFFICE VISIT (OUTPATIENT)
Dept: FAMILY MEDICINE CLINIC | Facility: CLINIC | Age: 66
End: 2021-11-04

## 2021-11-04 VITALS
BODY MASS INDEX: 30.36 KG/M2 | WEIGHT: 150.6 LBS | HEIGHT: 59 IN | SYSTOLIC BLOOD PRESSURE: 132 MMHG | HEART RATE: 53 BPM | TEMPERATURE: 97.7 F | DIASTOLIC BLOOD PRESSURE: 62 MMHG | OXYGEN SATURATION: 99 %

## 2021-11-04 DIAGNOSIS — E61.1 IRON DEFICIENCY: ICD-10-CM

## 2021-11-04 DIAGNOSIS — Z00.00 MEDICARE ANNUAL WELLNESS VISIT, SUBSEQUENT: Primary | ICD-10-CM

## 2021-11-04 DIAGNOSIS — N18.9 CHRONIC KIDNEY DISEASE, UNSPECIFIED CKD STAGE: ICD-10-CM

## 2021-11-04 DIAGNOSIS — Z78.0 POST-MENOPAUSAL: ICD-10-CM

## 2021-11-04 DIAGNOSIS — Z12.31 ENCOUNTER FOR SCREENING MAMMOGRAM FOR MALIGNANT NEOPLASM OF BREAST: ICD-10-CM

## 2021-11-04 DIAGNOSIS — R73.09 ELEVATED GLUCOSE: ICD-10-CM

## 2021-11-04 DIAGNOSIS — Z13.820 SCREENING FOR OSTEOPOROSIS: ICD-10-CM

## 2021-11-04 DIAGNOSIS — K21.9 GASTROESOPHAGEAL REFLUX DISEASE WITHOUT ESOPHAGITIS: ICD-10-CM

## 2021-11-04 DIAGNOSIS — N30.00 ACUTE CYSTITIS WITHOUT HEMATURIA: ICD-10-CM

## 2021-11-04 DIAGNOSIS — E03.9 HYPOTHYROIDISM, UNSPECIFIED TYPE: ICD-10-CM

## 2021-11-04 DIAGNOSIS — E78.5 HYPERLIPIDEMIA, UNSPECIFIED HYPERLIPIDEMIA TYPE: ICD-10-CM

## 2021-11-04 DIAGNOSIS — E55.9 VITAMIN D DEFICIENCY: ICD-10-CM

## 2021-11-04 DIAGNOSIS — G62.9 NEUROPATHY: ICD-10-CM

## 2021-11-04 DIAGNOSIS — Z11.59 NEED FOR HEPATITIS C SCREENING TEST: ICD-10-CM

## 2021-11-04 DIAGNOSIS — J44.9 CHRONIC OBSTRUCTIVE PULMONARY DISEASE, UNSPECIFIED COPD TYPE (HCC): ICD-10-CM

## 2021-11-04 PROBLEM — Z79.4 ENCOUNTER FOR LONG-TERM (CURRENT) USE OF INSULIN: Status: ACTIVE | Noted: 2021-10-04

## 2021-11-04 PROBLEM — M79.7 FIBROMYALGIA: Status: ACTIVE | Noted: 2021-10-04

## 2021-11-04 PROCEDURE — 1170F FXNL STATUS ASSESSED: CPT | Performed by: PHYSICIAN ASSISTANT

## 2021-11-04 PROCEDURE — 1125F AMNT PAIN NOTED PAIN PRSNT: CPT | Performed by: PHYSICIAN ASSISTANT

## 2021-11-04 PROCEDURE — 99214 OFFICE O/P EST MOD 30 MIN: CPT | Performed by: PHYSICIAN ASSISTANT

## 2021-11-04 PROCEDURE — 1159F MED LIST DOCD IN RCRD: CPT | Performed by: PHYSICIAN ASSISTANT

## 2021-11-04 PROCEDURE — G0439 PPPS, SUBSEQ VISIT: HCPCS | Performed by: PHYSICIAN ASSISTANT

## 2021-11-04 RX ORDER — GLUCOSAMINE HCL 500 MG
1 TABLET ORAL EVERY OTHER DAY
COMMUNITY

## 2021-11-04 RX ORDER — ASCORBIC ACID 500 MG
500 TABLET ORAL DAILY
COMMUNITY
End: 2022-04-28

## 2021-11-04 RX ORDER — MULTIPLE VITAMINS W/ MINERALS TAB 9MG-400MCG
1 TAB ORAL DAILY
COMMUNITY

## 2021-11-04 NOTE — PROGRESS NOTES
"Chief Complaint  Medicare Wellness-subsequent, Hyperlipidemia, COPD, Heartburn, and Peripheral Neuropathy    Subjective    History of Present Illness    Veda Peña presents for Annual Wellness Visit as well as for follow-up on chronic medical problems including hyperlipidemia, COPD, hypothyroidism and chronic back pain.     Past Medical History:   Diagnosis Date   • CKD (chronic kidney disease)    • COPD (chronic obstructive pulmonary disease) (HCC)    • Elevated glucose 04/14/2021   • GERD (gastroesophageal reflux disease)    • History of 2019 novel coronavirus disease (COVID-19) 04/14/2021   • Hyperlipidemia    • Hypothyroidism    • Neuropathic pain    • Neuropathy      Past Surgical History:   Procedure Laterality Date   • EYE SURGERY      cataract surgery of both eyes    • GALLBLADDER SURGERY     • HYSTERECTOMY     • KNEE ACL RECONSTRUCTION     • TUBAL ABDOMINAL LIGATION       Family History   Problem Relation Age of Onset   • Cancer Mother    • Cancer Father    • Cancer Sister    • Cancer Maternal Grandmother      Social History     Tobacco Use   • Smoking status: Former Smoker     Packs/day: 2.00     Years: 40.00     Pack years: 80.00     Types: Cigarettes   • Smokeless tobacco: Never Used   Substance Use Topics   • Alcohol use: Never     Vitals:    11/04/21 1342 11/04/21 1431   BP: 142/57 132/62   BP Location: Left arm    Patient Position: Sitting    Pulse: 53    Temp: 97.7 °F (36.5 °C)    TempSrc: Oral    SpO2: 99%    Weight: 68.3 kg (150 lb 9.6 oz)    Height: 149.9 cm (59\")      Body mass index is 30.42 kg/m².    Result Review :                Hyperlipidemia:  Patient is taking Simvastatin.  Patient denies nocturnal leg cramps, myalgias.  Patient attempts to maintain a diet low in fat and carbohydrates.    Hypothyroidism:  Patient takes Levothyroxine.  Patient states she takes medication first thing in the morning on an empty stomach with just a sip of water.  Patient denies weight gain/loss, fatigue, " jitters, hair shedding, neck fullness, difficulty swallowing.    Gastroesophageal Reflux:  Patient is taking Omeprazole, with good control of symptoms.  Patient does need over the counter medications for breakthrough symptoms since she has a Hiatal Hernia.  Patient tries to avoid trigger foods, eat frequent small meals, not lie down within 2 hours of eating, avoids NSAIDS medications and alcohol.    OAB:  Patient Takes Oxybutynin with good control of symptoms.    CKD:  Patient does not consult Nephrology.    Neuropathy/Right Knee Pain:  Patient takes Hyrdrocodone APAP, Gabapentin with good control of symptoms.  Patient is currently doing PT for knee pain in an attempt to avoid joint replacement.  Patient is managed per [facility].              The ABCs of the Annual Wellness Visit  Subsequent Medicare Wellness Visit    Chief Complaint   Patient presents with   • Medicare Wellness-subsequent   • Hyperlipidemia   • COPD   • Heartburn   • Peripheral Neuropathy      Subjective    History of Present Illness:  Veda Peña is a 66 y.o. female who presents for a Subsequent Medicare Wellness Visit.    The following portions of the patient's history were reviewed and   updated as appropriate: allergies, current medications, past family history, past medical history, past social history, past surgical history and problem list.    Compared to one year ago, the patient feels her physical   health is better.    Compared to one year ago, the patient feels her mental   health is the same.    Recent Hospitalizations:  She was not admitted to the hospital during the last year.       Current Medical Providers:  Patient Care Team:  Lina Nguyen PA as PCP - General (Family Medicine)    Outpatient Medications Prior to Visit   Medication Sig Dispense Refill   • albuterol sulfate  (90 Base) MCG/ACT inhaler albuterol sulfate 90 mcg/actuation inhalation HFA aerosol inhaler inhale 1 puff (90 mcg) by  inhalation route every 6 hours as needed   Active     • ascorbic acid (VITAMIN C) 500 MG tablet Take 500 mg by mouth Daily.     • Biotin 5000 MCG capsule Take 1 capsule by mouth Daily.     • Cholecalciferol (Vitamin D3) 75 MCG (3000 UT) tablet Take 1 tablet by mouth Every Other Day.     • gabapentin (NEURONTIN) 300 MG capsule Take 1 capsule by mouth 2 (two) times a day.     • HYDROcodone-acetaminophen (NORCO)  MG per tablet hydrocodone-acetaminophen  mg oral tablet take 2.5 tablets by oral route daily   Suspended     • levothyroxine sodium (TIROSINT) 50 MCG capsule Take 1 capsule by mouth Daily. 90 capsule 0   • multivitamin with minerals (MULTIVITAMIN ADULT PO) Take 1 tablet by mouth Daily.     • omeprazole (priLOSEC) 20 MG capsule Take 1 capsule by mouth Daily. 90 capsule 0   • oxybutynin XL (DITROPAN-XL) 10 MG 24 hr tablet Take 1 tablet by mouth Daily. 90 tablet 0   • Senna-Natural Laxatives (SENOKOT LAXATIVE PO) Take 1 tablet by mouth Every Night.     • simvastatin (ZOCOR) 20 MG tablet Take 1 tablet by mouth Every Night. 90 tablet 0   • nitrofurantoin, macrocrystal-monohydrate, (Macrobid) 100 MG capsule Take 1 capsule by mouth 2 (Two) Times a Day. 14 capsule 0     No facility-administered medications prior to visit.       Opioid medication/s are on active medication list.  and I have evaluated her active treatment plan and pain score trends (see table).  Vitals:    11/04/21 1342   PainSc:   4   PainLoc: Back     I have reviewed the chart for potential of high risk medication and harmful drug interactions in the elderly.            Aspirin is not on active medication list.  Aspirin use is not indicated based on review of current medical condition/s. Risk of harm outweighs potential benefits.  .    Patient Active Problem List   Diagnosis   • Chronic kidney disease   • COPD (chronic obstructive pulmonary disease) (HCC)   • Elevated glucose   • GERD (gastroesophageal reflux disease)   • History of 2019  "novel coronavirus disease (COVID-19)   • Hyperlipidemia   • Hypothyroidism   • Neuropathy   • DDD (degenerative disc disease), cervical   • Primary localized osteoarthrosis of right lower leg   • Right knee pain   • Fibromyalgia     Advance Care Planning  Advance Directive is not on file.  ACP discussion was held with the patient during this visit. Patient does not have an advance directive, information provided.          Objective    Vitals:    11/04/21 1342 11/04/21 1431   BP: 142/57 132/62   BP Location: Left arm    Patient Position: Sitting    Pulse: 53    Temp: 97.7 °F (36.5 °C)    TempSrc: Oral    SpO2: 99%    Weight: 68.3 kg (150 lb 9.6 oz)    Height: 149.9 cm (59\")    PainSc:   4    PainLoc: Back      BMI Readings from Last 1 Encounters:   11/04/21 30.42 kg/m²   BMI is above normal parameters. Recommendations include: educational material    Does the patient have evidence of cognitive impairment? No    Physical Exam  Vitals and nursing note reviewed.   Constitutional:       Appearance: Normal appearance.   HENT:      Head: Normocephalic and atraumatic.   Neck:      Vascular: No carotid bruit.      Comments: Thyroid : gland size normal, nontender, no nodules or masses present on palpation   Cardiovascular:      Rate and Rhythm: Normal rate and regular rhythm.      Pulses: Normal pulses.      Heart sounds: Normal heart sounds.   Pulmonary:      Effort: Pulmonary effort is normal.      Breath sounds: Normal breath sounds.   Musculoskeletal:      Cervical back: Neck supple. No tenderness.      Right lower leg: No edema.      Left lower leg: No edema.   Lymphadenopathy:      Cervical: No cervical adenopathy.   Neurological:      Mental Status: She is alert.   Psychiatric:         Mood and Affect: Mood normal.         Behavior: Behavior normal.                 HEALTH RISK ASSESSMENT    Smoking Status:  Social History     Tobacco Use   Smoking Status Former Smoker   • Packs/day: 2.00   • Years: 40.00   • Pack " years: 80.00   • Types: Cigarettes   Smokeless Tobacco Never Used     Alcohol Consumption:  Social History     Substance and Sexual Activity   Alcohol Use Never     Fall Risk Screen:    DEVONADI Fall Risk Assessment was completed, and patient is at LOW risk for falls.Assessment completed on:11/4/2021    Depression Screening:  PHQ-2/PHQ-9 Depression Screening 11/4/2021   Little interest or pleasure in doing things 1   Feeling down, depressed, or hopeless 1   Trouble falling or staying asleep, or sleeping too much 3   Feeling tired or having little energy 3   Poor appetite or overeating 3   Feeling bad about yourself - or that you are a failure or have let yourself or your family down 0   Trouble concentrating on things, such as reading the newspaper or watching television 0   Moving or speaking so slowly that other people could have noticed. Or the opposite - being so fidgety or restless that you have been moving around a lot more than usual 1   Thoughts that you would be better off dead, or of hurting yourself in some way 0   Total Score 12   If you checked off any problems, how difficult have these problems made it for you to do your work, take care of things at home, or get along with other people? Somewhat difficult       Health Habits and Functional and Cognitive Screening:  Functional & Cognitive Status 11/4/2021   Do you have difficulty preparing food and eating? No   Do you have difficulty bathing yourself, getting dressed or grooming yourself? No   Do you have difficulty using the toilet? No   Do you have difficulty moving around from place to place? No   Do you have trouble with steps or getting out of a bed or a chair? Yes   Current Diet Unhealthy Diet   Dental Exam Up to date   Eye Exam Up to date   Exercise (times per week) 4 times per week   Current Exercises Include Other   Do you need help using the phone?  No   Are you deaf or do you have serious difficulty hearing?  Yes   Do you need help with  transportation? No   Do you need help shopping? No   Do you need help preparing meals?  No   Do you need help with housework?  No   Do you need help with laundry? No   Do you need help taking your medications? No   Do you need help managing money? No   Do you ever drive or ride in a car without wearing a seat belt? No   Have you felt unusual stress, anger or loneliness in the last month? No   Who do you live with? Alone   If you need help, do you have trouble finding someone available to you? No   Have you been bothered in the last four weeks by sexual problems? No   Do you have difficulty concentrating, remembering or making decisions? Yes       Age-appropriate Screening Schedule:  Refer to the list below for future screening recommendations based on patient's age, sex and/or medical conditions. Orders for these recommended tests are listed in the plan section. The patient has been provided with a written plan.    Health Maintenance   Topic Date Due   • DXA SCAN  Never done   • TDAP/TD VACCINES (1 - Tdap) Never done   • ZOSTER VACCINE (1 of 2) Never done   • MAMMOGRAM  11/19/2020   • INFLUENZA VACCINE  11/04/2022 (Originally 8/1/2021)   • LIPID PANEL  04/14/2022              Assessment/Plan   CMS Preventative Services Quick Reference  Risk Factors Identified During Encounter  Depression/Dysphoria  Immunizations Discussed/Encouraged (specific Immunizations; Tdap, Influenza, Pneumococcal 23, Shingrix and COVID19  Obesity/Overweight   The above risks/problems have been discussed with the patient.  Follow up actions/plans if indicated are seen below in the Assessment/Plan Section.  Pertinent information has been shared with the patient in the After Visit Summary.    Diagnoses and all orders for this visit:    1. Medicare annual wellness visit, subsequent (Primary)    2. Hyperlipidemia, unspecified hyperlipidemia type  Assessment & Plan:  Stable on Zocor 20mg daily; check labs and follow up in 6mths.    Orders:  -      Comprehensive Metabolic Panel; Future  -     Lipid Panel; Future    3. Hypothyroidism, unspecified type  Assessment & Plan:  Stable on Levothyroxine 50mcg daily; check labs and follow up in 6mths.      Orders:  -     TSH; Future  -     T4, Free; Future    4. Chronic obstructive pulmonary disease, unspecified COPD type (HCC)  Assessment & Plan:  Stable with prn albuterol inhaler.        5. Neuropathy    6. Gastroesophageal reflux disease without esophagitis  Assessment & Plan:  Stable on Omeprazole 20mg daily; check labs and follow up in 6mths.        7. Chronic kidney disease, unspecified CKD stage  -     CBC & Differential; Future  -     Urinalysis With Culture If Indicated -; Future    8. Need for hepatitis C screening test  -     Hepatitis C Antibody; Future    9. Screening for osteoporosis  -     DEXA Bone Density Axial; Future    10. Encounter for screening mammogram for malignant neoplasm of breast  -     Mammo Screening Digital Tomosynthesis Bilateral With CAD; Future    11. Elevated glucose  -     Hemoglobin A1c; Future    12. Vitamin D deficiency  -     Vitamin D 25 Hydroxy; Future  -     DEXA Bone Density Axial; Future    13. Iron deficiency  -     Iron Profile; Future  -     Ferritin; Future  -     Vitamin B12; Future  -     Folate; Future    14. Post-menopausal  -     DEXA Bone Density Axial; Future      Follow Up:   Return in about 6 months (around 5/4/2022).     An After Visit Summary and PPPS were made available to the patient.

## 2021-11-08 ENCOUNTER — LAB (OUTPATIENT)
Dept: LAB | Facility: HOSPITAL | Age: 66
End: 2021-11-08

## 2021-11-08 DIAGNOSIS — E61.1 IRON DEFICIENCY: ICD-10-CM

## 2021-11-08 DIAGNOSIS — E03.9 HYPOTHYROIDISM, UNSPECIFIED TYPE: ICD-10-CM

## 2021-11-08 DIAGNOSIS — Z11.59 NEED FOR HEPATITIS C SCREENING TEST: ICD-10-CM

## 2021-11-08 DIAGNOSIS — E78.5 HYPERLIPIDEMIA, UNSPECIFIED HYPERLIPIDEMIA TYPE: ICD-10-CM

## 2021-11-08 DIAGNOSIS — R73.09 ELEVATED GLUCOSE: ICD-10-CM

## 2021-11-08 DIAGNOSIS — N18.9 CHRONIC KIDNEY DISEASE, UNSPECIFIED CKD STAGE: ICD-10-CM

## 2021-11-08 DIAGNOSIS — E55.9 VITAMIN D DEFICIENCY: ICD-10-CM

## 2021-11-08 PROBLEM — Z79.4 ENCOUNTER FOR LONG-TERM (CURRENT) USE OF INSULIN (HCC): Status: RESOLVED | Noted: 2021-10-04 | Resolved: 2021-11-08

## 2021-11-08 LAB
25(OH)D3 SERPL-MCNC: 60.1 NG/ML (ref 30–100)
ALBUMIN SERPL-MCNC: 4.5 G/DL (ref 3.5–5.2)
ALBUMIN/GLOB SERPL: 1.6 G/DL
ALP SERPL-CCNC: 58 U/L (ref 39–117)
ALT SERPL W P-5'-P-CCNC: 13 U/L (ref 1–33)
ANION GAP SERPL CALCULATED.3IONS-SCNC: 8.5 MMOL/L (ref 5–15)
AST SERPL-CCNC: 19 U/L (ref 1–32)
BACTERIA UR QL AUTO: ABNORMAL /HPF
BILIRUB SERPL-MCNC: 0.3 MG/DL (ref 0–1.2)
BILIRUB UR QL STRIP: NEGATIVE
BUN SERPL-MCNC: 14 MG/DL (ref 8–23)
BUN/CREAT SERPL: 16.7 (ref 7–25)
CALCIUM SPEC-SCNC: 9.7 MG/DL (ref 8.6–10.5)
CHLORIDE SERPL-SCNC: 105 MMOL/L (ref 98–107)
CHOLEST SERPL-MCNC: 152 MG/DL (ref 0–200)
CLARITY UR: CLEAR
CO2 SERPL-SCNC: 26.5 MMOL/L (ref 22–29)
COLOR UR: YELLOW
CREAT SERPL-MCNC: 0.84 MG/DL (ref 0.57–1)
FERRITIN SERPL-MCNC: 283 NG/ML (ref 13–150)
FOLATE SERPL-MCNC: >20 NG/ML (ref 4.78–24.2)
GFR SERPL CREATININE-BSD FRML MDRD: 68 ML/MIN/1.73
GLOBULIN UR ELPH-MCNC: 2.8 GM/DL
GLUCOSE SERPL-MCNC: 96 MG/DL (ref 65–99)
GLUCOSE UR STRIP-MCNC: NEGATIVE MG/DL
HBA1C MFR BLD: 6.1 % (ref 4.8–5.6)
HCV AB SER DONR QL: NORMAL
HDLC SERPL-MCNC: 45 MG/DL (ref 40–60)
HGB UR QL STRIP.AUTO: NEGATIVE
HYALINE CASTS UR QL AUTO: ABNORMAL /LPF
KETONES UR QL STRIP: NEGATIVE
LDLC SERPL CALC-MCNC: 86 MG/DL (ref 0–100)
LDLC/HDLC SERPL: 1.87 {RATIO}
LEUKOCYTE ESTERASE UR QL STRIP.AUTO: ABNORMAL
NITRITE UR QL STRIP: POSITIVE
PH UR STRIP.AUTO: 6.5 [PH] (ref 5–8)
POTASSIUM SERPL-SCNC: 4.7 MMOL/L (ref 3.5–5.2)
PROT SERPL-MCNC: 7.3 G/DL (ref 6–8.5)
PROT UR QL STRIP: NEGATIVE
RBC # UR: ABNORMAL /HPF
REF LAB TEST METHOD: ABNORMAL
SODIUM SERPL-SCNC: 140 MMOL/L (ref 136–145)
SP GR UR STRIP: 1.02 (ref 1–1.03)
SQUAMOUS #/AREA URNS HPF: ABNORMAL /HPF
T4 FREE SERPL-MCNC: 1.26 NG/DL (ref 0.93–1.7)
TRIGL SERPL-MCNC: 115 MG/DL (ref 0–150)
TSH SERPL DL<=0.05 MIU/L-ACNC: 2.82 UIU/ML (ref 0.27–4.2)
UROBILINOGEN UR QL STRIP: ABNORMAL
VIT B12 BLD-MCNC: 700 PG/ML (ref 211–946)
VLDLC SERPL-MCNC: 21 MG/DL (ref 5–40)
WBC UR QL AUTO: ABNORMAL /HPF

## 2021-11-08 PROCEDURE — 87077 CULTURE AEROBIC IDENTIFY: CPT

## 2021-11-08 PROCEDURE — 83540 ASSAY OF IRON: CPT

## 2021-11-08 PROCEDURE — 84439 ASSAY OF FREE THYROXINE: CPT

## 2021-11-08 PROCEDURE — 85025 COMPLETE CBC W/AUTO DIFF WBC: CPT

## 2021-11-08 PROCEDURE — 84443 ASSAY THYROID STIM HORMONE: CPT

## 2021-11-08 PROCEDURE — 80061 LIPID PANEL: CPT

## 2021-11-08 PROCEDURE — 84466 ASSAY OF TRANSFERRIN: CPT

## 2021-11-08 PROCEDURE — 87186 SC STD MICRODIL/AGAR DIL: CPT

## 2021-11-08 PROCEDURE — 82306 VITAMIN D 25 HYDROXY: CPT

## 2021-11-08 PROCEDURE — 81001 URINALYSIS AUTO W/SCOPE: CPT

## 2021-11-08 PROCEDURE — 82746 ASSAY OF FOLIC ACID SERUM: CPT

## 2021-11-08 PROCEDURE — 82728 ASSAY OF FERRITIN: CPT

## 2021-11-08 PROCEDURE — 36415 COLL VENOUS BLD VENIPUNCTURE: CPT

## 2021-11-08 PROCEDURE — 86803 HEPATITIS C AB TEST: CPT

## 2021-11-08 PROCEDURE — 83036 HEMOGLOBIN GLYCOSYLATED A1C: CPT

## 2021-11-08 PROCEDURE — 82607 VITAMIN B-12: CPT

## 2021-11-08 PROCEDURE — 87086 URINE CULTURE/COLONY COUNT: CPT

## 2021-11-08 PROCEDURE — 80053 COMPREHEN METABOLIC PANEL: CPT

## 2021-11-09 LAB
BASOPHILS # BLD AUTO: 0.05 10*3/MM3 (ref 0–0.2)
BASOPHILS NFR BLD AUTO: 0.7 % (ref 0–1.5)
DEPRECATED RDW RBC AUTO: 41.9 FL (ref 37–54)
EOSINOPHIL # BLD AUTO: 0.11 10*3/MM3 (ref 0–0.4)
EOSINOPHIL NFR BLD AUTO: 1.5 % (ref 0.3–6.2)
ERYTHROCYTE [DISTWIDTH] IN BLOOD BY AUTOMATED COUNT: 14.2 % (ref 12.3–15.4)
HCT VFR BLD AUTO: 41.2 % (ref 34–46.6)
HGB BLD-MCNC: 12.3 G/DL (ref 12–15.9)
IMM GRANULOCYTES # BLD AUTO: 0.04 10*3/MM3 (ref 0–0.05)
IMM GRANULOCYTES NFR BLD AUTO: 0.6 % (ref 0–0.5)
IRON 24H UR-MRATE: 50 MCG/DL (ref 37–145)
IRON SATN MFR SERPL: 15 % (ref 20–50)
LYMPHOCYTES # BLD AUTO: 2.22 10*3/MM3 (ref 0.7–3.1)
LYMPHOCYTES NFR BLD AUTO: 31.2 % (ref 19.6–45.3)
MCH RBC QN AUTO: 24.6 PG (ref 26.6–33)
MCHC RBC AUTO-ENTMCNC: 29.9 G/DL (ref 31.5–35.7)
MCV RBC AUTO: 82.4 FL (ref 79–97)
MONOCYTES # BLD AUTO: 0.59 10*3/MM3 (ref 0.1–0.9)
MONOCYTES NFR BLD AUTO: 8.3 % (ref 5–12)
NEUTROPHILS NFR BLD AUTO: 4.1 10*3/MM3 (ref 1.7–7)
NEUTROPHILS NFR BLD AUTO: 57.7 % (ref 42.7–76)
NRBC BLD AUTO-RTO: 0 /100 WBC (ref 0–0.2)
PLATELET # BLD AUTO: 247 10*3/MM3 (ref 140–450)
PMV BLD AUTO: 11.7 FL (ref 6–12)
RBC # BLD AUTO: 5 10*6/MM3 (ref 3.77–5.28)
TIBC SERPL-MCNC: 328 MCG/DL (ref 298–536)
TRANSFERRIN SERPL-MCNC: 220 MG/DL (ref 200–360)
WBC # BLD AUTO: 7.11 10*3/MM3 (ref 3.4–10.8)

## 2021-11-10 ENCOUNTER — TELEPHONE (OUTPATIENT)
Dept: FAMILY MEDICINE CLINIC | Facility: CLINIC | Age: 66
End: 2021-11-10

## 2021-11-10 LAB — BACTERIA SPEC AEROBE CULT: ABNORMAL

## 2021-11-10 RX ORDER — NITROFURANTOIN 25; 75 MG/1; MG/1
100 CAPSULE ORAL 2 TIMES DAILY
Qty: 14 CAPSULE | Refills: 0 | Status: SHIPPED | OUTPATIENT
Start: 2021-11-10 | End: 2022-01-04

## 2021-11-10 NOTE — TELEPHONE ENCOUNTER
Left a detailed voicemail message on patient's cell phone informing her of lab results and medication being sent to pharmacy.

## 2021-11-10 NOTE — TELEPHONE ENCOUNTER
----- Message from FREDERICK Sterling sent at 11/10/2021  2:19 PM EST -----  Please notify patient of abnormal results:  --she has a UTI; I will send in Macrobid to her pharmacy.  --A1c is a bit elevated; decrease sugars and carbs in diet.  -vitamin D is normal; continue on current dose.

## 2021-11-12 DIAGNOSIS — K21.9 GASTROESOPHAGEAL REFLUX DISEASE, UNSPECIFIED WHETHER ESOPHAGITIS PRESENT: ICD-10-CM

## 2021-11-12 DIAGNOSIS — E78.5 HYPERLIPIDEMIA, UNSPECIFIED HYPERLIPIDEMIA TYPE: ICD-10-CM

## 2021-11-12 DIAGNOSIS — E03.9 HYPOTHYROIDISM, UNSPECIFIED TYPE: ICD-10-CM

## 2021-11-12 DIAGNOSIS — N32.81 OAB (OVERACTIVE BLADDER): ICD-10-CM

## 2021-11-12 NOTE — TELEPHONE ENCOUNTER
Pharm sent request for several refills, it appears pt has none left at pharm. Last OV- 11/4/21, next OV-4/28/22

## 2021-11-15 ENCOUNTER — TELEPHONE (OUTPATIENT)
Dept: FAMILY MEDICINE CLINIC | Facility: CLINIC | Age: 66
End: 2021-11-15

## 2021-11-15 DIAGNOSIS — Z12.31 ENCOUNTER FOR SCREENING MAMMOGRAM FOR MALIGNANT NEOPLASM OF BREAST: Primary | ICD-10-CM

## 2021-11-15 DIAGNOSIS — N95.1 POSTMENOPAUSAL SYNDROME: ICD-10-CM

## 2021-11-15 RX ORDER — OMEPRAZOLE 20 MG/1
20 CAPSULE, DELAYED RELEASE ORAL DAILY
Qty: 90 CAPSULE | Refills: 1 | Status: SHIPPED | OUTPATIENT
Start: 2021-11-15 | End: 2022-04-28 | Stop reason: SDUPTHER

## 2021-11-15 RX ORDER — LEVOTHYROXINE SODIUM 50 UG/1
50 CAPSULE ORAL DAILY
Qty: 90 CAPSULE | Refills: 1 | Status: SHIPPED | OUTPATIENT
Start: 2021-11-15 | End: 2022-04-28 | Stop reason: SDUPTHER

## 2021-11-15 RX ORDER — OXYBUTYNIN CHLORIDE 10 MG/1
10 TABLET, EXTENDED RELEASE ORAL DAILY
Qty: 90 TABLET | Refills: 1 | Status: SHIPPED | OUTPATIENT
Start: 2021-11-15 | End: 2021-11-24 | Stop reason: SDUPTHER

## 2021-11-15 RX ORDER — SIMVASTATIN 20 MG
20 TABLET ORAL NIGHTLY
Qty: 90 TABLET | Refills: 1 | Status: SHIPPED | OUTPATIENT
Start: 2021-11-15 | End: 2022-04-28 | Stop reason: SDUPTHER

## 2021-11-15 NOTE — TELEPHONE ENCOUNTER
Just a reminder that your mammogram and Dexa are due please call [phone] option 3 to get this scheduled. Thank you.

## 2021-11-15 NOTE — TELEPHONE ENCOUNTER
Caller: Veda Peña    Relationship: Self    Best call back number: [phone]     What orders are you requesting (i.e. lab or imaging): MAMMOGRAM AND DEXASCAN    In what timeframe would the patient need to come in: AS SOON AS POSSIBLE    Where will you receive your lab/imaging services: [facility]

## 2021-11-24 DIAGNOSIS — N32.81 OAB (OVERACTIVE BLADDER): ICD-10-CM

## 2021-11-24 RX ORDER — OXYBUTYNIN CHLORIDE 10 MG/1
10 TABLET, EXTENDED RELEASE ORAL DAILY
Qty: 90 TABLET | Refills: 0 | Status: SHIPPED | OUTPATIENT
Start: 2021-11-24 | End: 2021-11-29 | Stop reason: SDUPTHER

## 2021-11-24 NOTE — TELEPHONE ENCOUNTER
Caller: Veda Peña    Relationship: Self    Best call back number: [phone]    Requested Prescriptions:   Requested Prescriptions      No prescriptions requested or ordered in this encounter    OXYBUTYNIN CHLORIDE 10 MG 2 PER DAY     Pharmacy where request should be sent:  [facility] ([place]) - [address] - [phone]  - [phone] FX    Additional details provided by patient: PATIENT STATED THAT SHE HAS ALWAYS TAKEN 2 PER DAY AND LAST FILL WAS FOR 1 PER DAY SO SHE ONLY HAS 4 DAYS REMAINING     Does the patient have less than a 3 day supply:  [] Yes  [x] No    Jie WEBSTER Rep   11/24/21 09:22 EST

## 2021-11-29 ENCOUNTER — TELEPHONE (OUTPATIENT)
Dept: FAMILY MEDICINE CLINIC | Facility: CLINIC | Age: 66
End: 2021-11-29

## 2021-11-29 DIAGNOSIS — N32.81 OAB (OVERACTIVE BLADDER): ICD-10-CM

## 2021-11-29 RX ORDER — OXYBUTYNIN CHLORIDE 10 MG/1
20 TABLET, EXTENDED RELEASE ORAL DAILY
Qty: 180 TABLET | Refills: 1 | Status: SHIPPED | OUTPATIENT
Start: 2021-11-29 | End: 2022-04-28 | Stop reason: SDUPTHER

## 2021-11-29 NOTE — TELEPHONE ENCOUNTER
Caller: Veda Peña    Relationship: Self    Best call back number: [phone]     Requested Prescriptions:    OXYBUTYNIN CHLORIDE 10 MG 2 PER DAY     Pharmacy where request should be sent: [facility] ([place]) - [address] - [phone] [facility] [phone] FX     Additional details provided by patient: PATIENT STATED THAT SHE HAS ALWAYS TAKEN 2 PER DAY AND LAST FILL WAS FOR 1 PER DAY SO SHE ONLY HAS 1 DAY REMAINING. PLEASE CALL PATIENT AND ADVISE.  Does the patient have less than a 3 day supply:  [x] Yes  [] No    Jie Ledbetter Rep   11/29/21 13:10 EST

## 2021-12-06 DIAGNOSIS — J45.909 REACTIVE AIRWAY DISEASE WITHOUT COMPLICATION, UNSPECIFIED ASTHMA SEVERITY, UNSPECIFIED WHETHER PERSISTENT: Primary | ICD-10-CM

## 2021-12-06 RX ORDER — ALBUTEROL SULFATE 90 UG/1
2 AEROSOL, METERED RESPIRATORY (INHALATION) EVERY 6 HOURS PRN
Qty: 18 G | Refills: 1 | Status: SHIPPED | OUTPATIENT
Start: 2021-12-06 | End: 2022-04-28 | Stop reason: SDUPTHER

## 2022-01-04 ENCOUNTER — OFFICE VISIT (OUTPATIENT)
Dept: FAMILY MEDICINE CLINIC | Facility: CLINIC | Age: 67
End: 2022-01-04

## 2022-01-04 VITALS
BODY MASS INDEX: 30.84 KG/M2 | WEIGHT: 153 LBS | HEART RATE: 58 BPM | DIASTOLIC BLOOD PRESSURE: 62 MMHG | SYSTOLIC BLOOD PRESSURE: 121 MMHG | HEIGHT: 59 IN | TEMPERATURE: 97.6 F | OXYGEN SATURATION: 96 %

## 2022-01-04 DIAGNOSIS — R39.15 URINARY URGENCY: ICD-10-CM

## 2022-01-04 DIAGNOSIS — N39.0 URINARY TRACT INFECTION WITH HEMATURIA, SITE UNSPECIFIED: Primary | ICD-10-CM

## 2022-01-04 DIAGNOSIS — R31.9 URINARY TRACT INFECTION WITH HEMATURIA, SITE UNSPECIFIED: Primary | ICD-10-CM

## 2022-01-04 DIAGNOSIS — R30.0 DYSURIA: ICD-10-CM

## 2022-01-04 DIAGNOSIS — R35.0 URINARY FREQUENCY: ICD-10-CM

## 2022-01-04 LAB
BILIRUB BLD-MCNC: NEGATIVE MG/DL
CLARITY, POC: ABNORMAL
COLOR UR: YELLOW
EXPIRATION DATE: ABNORMAL
GLUCOSE UR STRIP-MCNC: NEGATIVE MG/DL
KETONES UR QL: NEGATIVE
LEUKOCYTE EST, POC: ABNORMAL
Lab: ABNORMAL
NITRITE UR-MCNC: POSITIVE MG/ML
PH UR: 5.5 [PH] (ref 5–8)
PROT UR STRIP-MCNC: ABNORMAL MG/DL
RBC # UR STRIP: ABNORMAL /UL
SP GR UR: 1.03 (ref 1–1.03)
UROBILINOGEN UR QL: NORMAL

## 2022-01-04 PROCEDURE — 99213 OFFICE O/P EST LOW 20 MIN: CPT | Performed by: NURSE PRACTITIONER

## 2022-01-04 PROCEDURE — 81003 URINALYSIS AUTO W/O SCOPE: CPT | Performed by: NURSE PRACTITIONER

## 2022-01-04 PROCEDURE — 87186 SC STD MICRODIL/AGAR DIL: CPT | Performed by: NURSE PRACTITIONER

## 2022-01-04 PROCEDURE — 87088 URINE BACTERIA CULTURE: CPT | Performed by: NURSE PRACTITIONER

## 2022-01-04 PROCEDURE — 87086 URINE CULTURE/COLONY COUNT: CPT | Performed by: NURSE PRACTITIONER

## 2022-01-04 RX ORDER — NITROFURANTOIN 25; 75 MG/1; MG/1
100 CAPSULE ORAL 2 TIMES DAILY
Qty: 14 CAPSULE | Refills: 0 | Status: SHIPPED | OUTPATIENT
Start: 2022-01-04 | End: 2022-04-28 | Stop reason: ALTCHOICE

## 2022-01-04 NOTE — PROGRESS NOTES
Chief Complaint  Difficulty Urinating and Urinary Frequency    Subjective          Veda Peña presents to [facility]  Dysuria   This is a recurrent problem. The current episode started in the past 7 days. The problem occurs every urination. The problem has been gradually worsening. The quality of the pain is described as aching, burning, shooting and stabbing. The pain is at a severity of 10/10. There has been no fever. She is not sexually active. There is a history of pyelonephritis. Associated symptoms include flank pain, frequency, hesitancy, nausea and urgency. Pertinent negatives include no chills, possible pregnancy, sweats or vomiting. Treatments tried: AZO. The treatment provided no relief. Her past medical history is significant for recurrent UTIs.       Patient complaining of dysuria, urinary urgency, urinary frequency X 5 days.  Patient denies fever, hematuria.  Patient admits to low back pain, left flank pain.      Past Medical History:   Diagnosis Date   • CKD (chronic kidney disease)    • COPD (chronic obstructive pulmonary disease) (HCC)    • Elevated glucose 04/14/2021   • GERD (gastroesophageal reflux disease)    • History of 2019 novel coronavirus disease (COVID-19) 04/14/2021   • Hyperlipidemia    • Hypothyroidism    • Neuropathic pain    • Neuropathy          Allergies   Allergen Reactions   • Tramadol Nausea And Vomiting   • Prednisone Unknown - Low Severity   • Codeine Palpitations   • Diazepam Anxiety   • Venlafaxine Nausea And Vomiting          Past Surgical History:   Procedure Laterality Date   • EYE SURGERY      cataract surgery of both eyes    • GALLBLADDER SURGERY     • HYSTERECTOMY     • KNEE ACL RECONSTRUCTION     • TUBAL ABDOMINAL LIGATION            Social History     Tobacco Use   • Smoking status: Former Smoker     Packs/day: 2.00     Years: 40.00     Pack years: 80.00     Types: Cigarettes   • Smokeless tobacco: Never Used   Substance Use  "Topics   • Alcohol use: Never         Family History   Problem Relation Age of Onset   • Cancer Mother    • Cancer Father    • Cancer Sister    • Cancer Maternal Grandmother    • Unknown Other    • Unknown Other    • Unknown Other    • Unknown Other    • Unknown Other           Current Outpatient Medications on File Prior to Visit   Medication Sig   • albuterol sulfate  (90 Base) MCG/ACT inhaler Inhale 2 puffs Every 6 (Six) Hours As Needed for Wheezing.   • ascorbic acid (VITAMIN C) 500 MG tablet Take 500 mg by mouth Daily.   • Biotin 5000 MCG capsule Take 1 capsule by mouth Daily.   • Cholecalciferol (Vitamin D3) 75 MCG (3000 UT) tablet Take 1 tablet by mouth Every Other Day.   • gabapentin (NEURONTIN) 300 MG capsule Take 1 capsule by mouth 2 (two) times a day.   • HYDROcodone-acetaminophen (NORCO)  MG per tablet hydrocodone-acetaminophen  mg oral tablet take 2.5 tablets by oral route daily   Suspended   • levothyroxine sodium (TIROSINT) 50 MCG capsule Take 1 capsule by mouth Daily.   • multivitamin with minerals (MULTIVITAMIN ADULT PO) Take 1 tablet by mouth Daily.   • omeprazole (priLOSEC) 20 MG capsule Take 1 capsule by mouth Daily.   • oxybutynin XL (DITROPAN-XL) 10 MG 24 hr tablet Take 2 tablets by mouth Daily.   • Senna-Natural Laxatives (SENOKOT LAXATIVE PO) Take 1 tablet by mouth Every Night.   • simvastatin (ZOCOR) 20 MG tablet Take 1 tablet by mouth Every Night.   • [DISCONTINUED] nitrofurantoin, macrocrystal-monohydrate, (Macrobid) 100 MG capsule Take 1 capsule by mouth 2 (Two) Times a Day.     No current facility-administered medications on file prior to visit.           There is no immunization history on file for this patient.      /62 (BP Location: Left arm, Patient Position: Sitting)   Pulse 58   Temp 97.6 °F (36.4 °C) (Oral)   Ht 149.9 cm (59\")   Wt 69.4 kg (153 lb)   SpO2 96%   BMI 30.90 kg/m²             Physical Exam  Vitals reviewed.   Constitutional:       " Appearance: Normal appearance. She is well-developed.   HENT:      Head: Normocephalic and atraumatic.      Right Ear: External ear normal.      Left Ear: External ear normal.      Mouth/Throat:      Pharynx: No oropharyngeal exudate.   Eyes:      Conjunctiva/sclera: Conjunctivae normal.      Pupils: Pupils are equal, round, and reactive to light.   Cardiovascular:      Rate and Rhythm: Normal rate and regular rhythm.      Heart sounds: No murmur heard.  No friction rub. No gallop.    Pulmonary:      Effort: Pulmonary effort is normal.      Breath sounds: Normal breath sounds. No wheezing or rhonchi.   Musculoskeletal:      Comments: TTP left flank area   Skin:     General: Skin is warm and dry.   Neurological:      Mental Status: She is alert and oriented to person, place, and time.      Cranial Nerves: No cranial nerve deficit.   Psychiatric:         Mood and Affect: Mood and affect normal.         Behavior: Behavior normal.         Thought Content: Thought content normal.         Judgment: Judgment normal.             Result Review :     The following data was reviewed by: ANA Martinez on 01/04/2022:  urinalysis dipstick, automated (Order #[id number]) on 1/4/22               Contains abnormal data POCT urinalysis dipstick, automated  Order: [id number]   Status: Final result       Visible to patient: No (scheduled for 1/5/2022 12:44 AM)       Next appt: 04/28/2022 at 10:30 AM in Family Medicine ([facility])       Dx: Dysuria; Urinary frequency; Urinary u...      Specimen Information: Urine         0 Result Notes      Component   Ref Range & Units 10:43   (1/4/22) 1 mo ago   (11/8/21) 6 mo ago   (6/25/21) 6 mo ago   (6/11/21) 8 mo ago   (4/14/21)   Color   Yellow, Straw, Dark Yellow, Jaki Yellow  Yellow R  Yellow  Yellow     Clarity, UA   Clear Cloudy Abnormal   Clear  Clear  Turbid Abnormal   CLOUDY Abnormal  R    Specific Gravity    1.005 - 1.030 1.030  1.024  1.020  1.025  1.024 R    pH,  Urine   5.0 - 8.0 5.5  6.5  7.0  7.0  6.0 R    Leukocytes   Negative Small (1+) Abnormal   Small (1+) Abnormal   Moderate (2+) Abnormal   Large (3+) Abnormal   MODERATE Abnormal  R, CM    Nitrite, UA   Negative Positive Abnormal   Positive Abnormal   Negative  Negative  POSITIVE Abnormal  R    Protein, POC   Negative mg/dL 30 mg/dL Abnormal   Negative R  Negative  Negative     Glucose, UA   Negative, 1000 mg/dL (3+) mg/dL Negative  Negative R  Negative  Negative  NEGATIVE R    Ketones, UA   Negative Negative  Negative  Negative  Negative  NEGATIVE R    Urobilinogen, UA   Normal Normal  0.2 E.U./dL R  Normal  Normal  0.2 R    Bilirubin   Negative Negative  Negative  Negative  Negative     Blood, UA   Negative Small Abnormal   Negative  Negative  250 Julien/ul Abnormal   NEGATIVE R    Lot Number  [id number]        Expiration Date  10/31/22        Collection Source (UA)     Clean Catch R    Color     YELLOW R    Protein Urine Screen     NEGATIVE R    BILIRUBIN SCREEN URINE     NEGATIVE R    WBC, UA     TNTC(>51) Abnormal  R    RBC, UA     NONE SEEN R    BACTERIA     TNTC Abnormal  R    Hyaline Casts, UA     2-5 R    Resulting Agency [facility]                 Specimen Collected: 01/04/22 10:43 Last Resulted: 01/04/22 10:43        Lab Flowsheet       Order Details       View Encounter       Lab and Collection Details       Routing       Result History          CM=Additional comments  R=Reference range differs from displayed range          Result Care Coordination                              Assessment and Plan      Diagnoses and all orders for this visit:    1. Urinary tract infection with hematuria, site unspecified (Primary)  -     Urine Culture - Urine, Urine, Clean Catch; Future  -     Urine Culture - Urine, Urine, Clean Catch  -     nitrofurantoin, macrocrystal-monohydrate, (Macrobid) 100 MG capsule; Take 1 capsule  by mouth 2 (Two) Times a Day.  Dispense: 14 capsule; Refill: 0    2. Dysuria  -     POCT urinalysis dipstick, automated  -     Urine Culture - Urine, Urine, Clean Catch; Future  -     Urine Culture - Urine, Urine, Clean Catch    3. Urinary urgency  -     POCT urinalysis dipstick, automated  -     Urine Culture - Urine, Urine, Clean Catch; Future  -     Urine Culture - Urine, Urine, Clean Catch    4. Urinary frequency  -     POCT urinalysis dipstick, automated  -     Urine Culture - Urine, Urine, Clean Catch; Future  -     Urine Culture - Urine, Urine, Clean Catch              Follow Up     Return if symptoms worsen or fail to improve.    Patient was given instructions and counseling regarding her condition or for health maintenance advice. Please see specific information pulled into the AVS if appropriate.         Answers for HPI/ROS submitted by the patient on 1/4/2022  What is the primary reason for your visit?: Painful Urination

## 2022-01-06 LAB — BACTERIA SPEC AEROBE CULT: ABNORMAL

## 2022-01-11 ENCOUNTER — TELEPHONE (OUTPATIENT)
Dept: FAMILY MEDICINE CLINIC | Facility: CLINIC | Age: 67
End: 2022-01-11

## 2022-04-06 NOTE — TELEPHONE ENCOUNTER
----- Message from ANA Martinez sent at 1/6/2022  2:34 PM EST -----  Positive urine culture, complete antibiotics as prescribed, repeat u/a in 2 weeks  
Patient aware of results.   
Continue Prozac 20mg daily.

## 2022-04-28 ENCOUNTER — LAB (OUTPATIENT)
Dept: LAB | Facility: HOSPITAL | Age: 67
End: 2022-04-28

## 2022-04-28 ENCOUNTER — OFFICE VISIT (OUTPATIENT)
Dept: FAMILY MEDICINE CLINIC | Facility: CLINIC | Age: 67
End: 2022-04-28

## 2022-04-28 VITALS
OXYGEN SATURATION: 98 % | SYSTOLIC BLOOD PRESSURE: 127 MMHG | DIASTOLIC BLOOD PRESSURE: 53 MMHG | WEIGHT: 161.8 LBS | BODY MASS INDEX: 32.62 KG/M2 | HEART RATE: 55 BPM | HEIGHT: 59 IN

## 2022-04-28 DIAGNOSIS — K21.9 GASTROESOPHAGEAL REFLUX DISEASE, UNSPECIFIED WHETHER ESOPHAGITIS PRESENT: Chronic | ICD-10-CM

## 2022-04-28 DIAGNOSIS — R53.83 OTHER FATIGUE: ICD-10-CM

## 2022-04-28 DIAGNOSIS — E78.5 HYPERLIPIDEMIA, UNSPECIFIED HYPERLIPIDEMIA TYPE: Chronic | ICD-10-CM

## 2022-04-28 DIAGNOSIS — K21.9 GASTROESOPHAGEAL REFLUX DISEASE, UNSPECIFIED WHETHER ESOPHAGITIS PRESENT: ICD-10-CM

## 2022-04-28 DIAGNOSIS — R73.09 ELEVATED GLUCOSE: ICD-10-CM

## 2022-04-28 DIAGNOSIS — J45.909 REACTIVE AIRWAY DISEASE WITHOUT COMPLICATION, UNSPECIFIED ASTHMA SEVERITY, UNSPECIFIED WHETHER PERSISTENT: Chronic | ICD-10-CM

## 2022-04-28 DIAGNOSIS — R63.5 WEIGHT GAIN: Primary | ICD-10-CM

## 2022-04-28 DIAGNOSIS — E03.9 HYPOTHYROIDISM, UNSPECIFIED TYPE: Chronic | ICD-10-CM

## 2022-04-28 DIAGNOSIS — E66.09 CLASS 1 OBESITY DUE TO EXCESS CALORIES WITH SERIOUS COMORBIDITY AND BODY MASS INDEX (BMI) OF 32.0 TO 32.9 IN ADULT: ICD-10-CM

## 2022-04-28 DIAGNOSIS — E78.5 HYPERLIPIDEMIA, UNSPECIFIED HYPERLIPIDEMIA TYPE: ICD-10-CM

## 2022-04-28 DIAGNOSIS — E55.9 VITAMIN D DEFICIENCY: ICD-10-CM

## 2022-04-28 DIAGNOSIS — R63.5 WEIGHT GAIN: ICD-10-CM

## 2022-04-28 DIAGNOSIS — N30.00 ACUTE CYSTITIS WITHOUT HEMATURIA: ICD-10-CM

## 2022-04-28 DIAGNOSIS — E55.9 VITAMIN D DEFICIENCY: Chronic | ICD-10-CM

## 2022-04-28 DIAGNOSIS — E03.9 HYPOTHYROIDISM, UNSPECIFIED TYPE: ICD-10-CM

## 2022-04-28 DIAGNOSIS — N32.81 OAB (OVERACTIVE BLADDER): ICD-10-CM

## 2022-04-28 DIAGNOSIS — J45.909 REACTIVE AIRWAY DISEASE WITHOUT COMPLICATION, UNSPECIFIED ASTHMA SEVERITY, UNSPECIFIED WHETHER PERSISTENT: ICD-10-CM

## 2022-04-28 DIAGNOSIS — N32.81 OAB (OVERACTIVE BLADDER): Chronic | ICD-10-CM

## 2022-04-28 PROBLEM — M17.11 OSTEOARTHRITIS OF RIGHT KNEE: Status: ACTIVE | Noted: 2022-04-06

## 2022-04-28 PROBLEM — E66.811 CLASS 1 OBESITY DUE TO EXCESS CALORIES WITH BODY MASS INDEX (BMI) OF 32.0 TO 32.9 IN ADULT: Status: ACTIVE | Noted: 2022-04-28

## 2022-04-28 PROBLEM — M54.50 LOW BACK PAIN: Status: ACTIVE | Noted: 2022-04-15

## 2022-04-28 LAB
25(OH)D3 SERPL-MCNC: 57.8 NG/ML (ref 30–100)
ALBUMIN SERPL-MCNC: 4.7 G/DL (ref 3.5–5.2)
ALBUMIN/GLOB SERPL: 1.7 G/DL
ALP SERPL-CCNC: 76 U/L (ref 39–117)
ALT SERPL W P-5'-P-CCNC: 19 U/L (ref 1–33)
ANION GAP SERPL CALCULATED.3IONS-SCNC: 14.5 MMOL/L (ref 5–15)
AST SERPL-CCNC: 22 U/L (ref 1–32)
BACTERIA UR QL AUTO: ABNORMAL /HPF
BASOPHILS # BLD AUTO: 0.04 10*3/MM3 (ref 0–0.2)
BASOPHILS NFR BLD AUTO: 0.6 % (ref 0–1.5)
BILIRUB SERPL-MCNC: 0.4 MG/DL (ref 0–1.2)
BILIRUB UR QL STRIP: NEGATIVE
BUN SERPL-MCNC: 16 MG/DL (ref 8–23)
BUN/CREAT SERPL: 16.2 (ref 7–25)
CALCIUM SPEC-SCNC: 10 MG/DL (ref 8.6–10.5)
CHLORIDE SERPL-SCNC: 102 MMOL/L (ref 98–107)
CHOLEST SERPL-MCNC: 171 MG/DL (ref 0–200)
CLARITY UR: CLEAR
CO2 SERPL-SCNC: 24.5 MMOL/L (ref 22–29)
COD CRY URNS QL: ABNORMAL /HPF
COLOR UR: YELLOW
CREAT SERPL-MCNC: 0.99 MG/DL (ref 0.57–1)
DEPRECATED RDW RBC AUTO: 40.8 FL (ref 37–54)
EGFRCR SERPLBLD CKD-EPI 2021: 63 ML/MIN/1.73
EOSINOPHIL # BLD AUTO: 0.11 10*3/MM3 (ref 0–0.4)
EOSINOPHIL NFR BLD AUTO: 1.5 % (ref 0.3–6.2)
ERYTHROCYTE [DISTWIDTH] IN BLOOD BY AUTOMATED COUNT: 14.4 % (ref 12.3–15.4)
FOLATE SERPL-MCNC: >20 NG/ML (ref 4.78–24.2)
GLOBULIN UR ELPH-MCNC: 2.8 GM/DL
GLUCOSE SERPL-MCNC: 97 MG/DL (ref 65–99)
GLUCOSE UR STRIP-MCNC: NEGATIVE MG/DL
HBA1C MFR BLD: 5.8 % (ref 4.8–5.6)
HCT VFR BLD AUTO: 39.1 % (ref 34–46.6)
HDLC SERPL-MCNC: 52 MG/DL (ref 40–60)
HGB BLD-MCNC: 12.6 G/DL (ref 12–15.9)
HGB UR QL STRIP.AUTO: NEGATIVE
HYALINE CASTS UR QL AUTO: ABNORMAL /LPF
IMM GRANULOCYTES # BLD AUTO: 0.04 10*3/MM3 (ref 0–0.05)
IMM GRANULOCYTES NFR BLD AUTO: 0.6 % (ref 0–0.5)
KETONES UR QL STRIP: NEGATIVE
LDLC SERPL CALC-MCNC: 96 MG/DL (ref 0–100)
LDLC/HDLC SERPL: 1.79 {RATIO}
LEUKOCYTE ESTERASE UR QL STRIP.AUTO: ABNORMAL
LYMPHOCYTES # BLD AUTO: 2.29 10*3/MM3 (ref 0.7–3.1)
LYMPHOCYTES NFR BLD AUTO: 32.3 % (ref 19.6–45.3)
MCH RBC QN AUTO: 25.4 PG (ref 26.6–33)
MCHC RBC AUTO-ENTMCNC: 32.2 G/DL (ref 31.5–35.7)
MCV RBC AUTO: 78.8 FL (ref 79–97)
MONOCYTES # BLD AUTO: 0.5 10*3/MM3 (ref 0.1–0.9)
MONOCYTES NFR BLD AUTO: 7 % (ref 5–12)
NEUTROPHILS NFR BLD AUTO: 4.12 10*3/MM3 (ref 1.7–7)
NEUTROPHILS NFR BLD AUTO: 58 % (ref 42.7–76)
NITRITE UR QL STRIP: POSITIVE
NRBC BLD AUTO-RTO: 0 /100 WBC (ref 0–0.2)
PH UR STRIP.AUTO: 7 [PH] (ref 5–8)
PLATELET # BLD AUTO: 260 10*3/MM3 (ref 140–450)
PMV BLD AUTO: 11.1 FL (ref 6–12)
POTASSIUM SERPL-SCNC: 4.7 MMOL/L (ref 3.5–5.2)
PROT SERPL-MCNC: 7.5 G/DL (ref 6–8.5)
PROT UR QL STRIP: NEGATIVE
RBC # BLD AUTO: 4.96 10*6/MM3 (ref 3.77–5.28)
RBC # UR STRIP: ABNORMAL /HPF
REF LAB TEST METHOD: ABNORMAL
SODIUM SERPL-SCNC: 141 MMOL/L (ref 136–145)
SP GR UR STRIP: 1.02 (ref 1–1.03)
SQUAMOUS #/AREA URNS HPF: ABNORMAL /HPF
T4 FREE SERPL-MCNC: 1.04 NG/DL (ref 0.93–1.7)
TRIGL SERPL-MCNC: 129 MG/DL (ref 0–150)
TSH SERPL DL<=0.05 MIU/L-ACNC: 6.06 UIU/ML (ref 0.27–4.2)
UROBILINOGEN UR QL STRIP: ABNORMAL
VIT B12 BLD-MCNC: 648 PG/ML (ref 211–946)
VLDLC SERPL-MCNC: 23 MG/DL (ref 5–40)
WBC # UR STRIP: ABNORMAL /HPF
WBC NRBC COR # BLD: 7.1 10*3/MM3 (ref 3.4–10.8)

## 2022-04-28 PROCEDURE — 84443 ASSAY THYROID STIM HORMONE: CPT

## 2022-04-28 PROCEDURE — 87088 URINE BACTERIA CULTURE: CPT

## 2022-04-28 PROCEDURE — 87186 SC STD MICRODIL/AGAR DIL: CPT

## 2022-04-28 PROCEDURE — 80053 COMPREHEN METABOLIC PANEL: CPT

## 2022-04-28 PROCEDURE — 36415 COLL VENOUS BLD VENIPUNCTURE: CPT

## 2022-04-28 PROCEDURE — 82306 VITAMIN D 25 HYDROXY: CPT

## 2022-04-28 PROCEDURE — 87086 URINE CULTURE/COLONY COUNT: CPT

## 2022-04-28 PROCEDURE — 82746 ASSAY OF FOLIC ACID SERUM: CPT

## 2022-04-28 PROCEDURE — 85025 COMPLETE CBC W/AUTO DIFF WBC: CPT

## 2022-04-28 PROCEDURE — 81001 URINALYSIS AUTO W/SCOPE: CPT

## 2022-04-28 PROCEDURE — 83036 HEMOGLOBIN GLYCOSYLATED A1C: CPT

## 2022-04-28 PROCEDURE — 84439 ASSAY OF FREE THYROXINE: CPT

## 2022-04-28 PROCEDURE — 99214 OFFICE O/P EST MOD 30 MIN: CPT | Performed by: PHYSICIAN ASSISTANT

## 2022-04-28 PROCEDURE — 80061 LIPID PANEL: CPT

## 2022-04-28 PROCEDURE — 82607 VITAMIN B-12: CPT

## 2022-04-28 RX ORDER — FLUOROURACIL 50 MG/G
CREAM TOPICAL
COMMUNITY
End: 2022-11-30

## 2022-04-28 RX ORDER — OMEPRAZOLE 20 MG/1
20 CAPSULE, DELAYED RELEASE ORAL DAILY
Qty: 90 CAPSULE | Refills: 1 | Status: SHIPPED | OUTPATIENT
Start: 2022-04-28 | End: 2022-11-30 | Stop reason: SDUPTHER

## 2022-04-28 RX ORDER — SIMVASTATIN 20 MG
20 TABLET ORAL NIGHTLY
Qty: 90 TABLET | Refills: 1 | Status: SHIPPED | OUTPATIENT
Start: 2022-04-28 | End: 2022-10-14 | Stop reason: SDUPTHER

## 2022-04-28 RX ORDER — ALBUTEROL SULFATE 90 UG/1
2 AEROSOL, METERED RESPIRATORY (INHALATION) EVERY 6 HOURS PRN
Qty: 18 G | Refills: 1 | Status: SHIPPED | OUTPATIENT
Start: 2022-04-28 | End: 2022-11-30 | Stop reason: SDUPTHER

## 2022-04-28 RX ORDER — LEVOTHYROXINE SODIUM 50 UG/1
50 CAPSULE ORAL DAILY
Qty: 90 CAPSULE | Refills: 1 | Status: SHIPPED | OUTPATIENT
Start: 2022-04-28 | End: 2022-05-02 | Stop reason: SDUPTHER

## 2022-04-28 RX ORDER — OXYBUTYNIN CHLORIDE 10 MG/1
20 TABLET, EXTENDED RELEASE ORAL DAILY
Qty: 180 TABLET | Refills: 1 | Status: SHIPPED | OUTPATIENT
Start: 2022-04-28 | End: 2022-10-17 | Stop reason: SDUPTHER

## 2022-04-28 RX ORDER — FLUOROURACIL 50 MG/G
CREAM TOPICAL
COMMUNITY
Start: 2022-04-13 | End: 2022-04-28 | Stop reason: SDUPTHER

## 2022-04-28 NOTE — PROGRESS NOTES
Chief Complaint  Chief Complaint   Patient presents with   • Hyperlipidemia   • COPD   • Hypothyroidism   • Pain     Back pain       Subjective          Veda Peña presents to [facility]  History of Present Illness    Patient presents today for a 6 month follow up for HL, COPD, Hypothyroidism and chronic pain.     HL: Patient presents for management of hyperlipidemia. Patient denies muscle cramps and muscle weakness. Patient is monitoring diet to help lower lipids.    COPD: Patient presents for management of COPD. Patient denies any shortness of air, cough or wheezing. Patient has/has not had any recent COPD exacerbations. Patient is followed by pulmonology.    Hypothyroidism: Patient is currently on Tirosint 50MCG and doing well. Patient states energy is fair; she does complain of fatigue and weight gain. Patient denies bowel changes and changes in hair, skin and nails.    GERD: Patient is currently on Omeprazole 20mg daily for the treatment of GERD. Patient is doing well without breakthrough symptoms. Patient has recently tried to discontinue medication and symptoms of reflux returned.    OAB: Patient is currently on oxybutynin XL 10mg daily for overactive bladder. Patient states the medication is working well. Patient denies urinary frequency and leakage. Patient denies any adverse effects from medication.    Patient also has vitamin D deficiency and is on OTC supplement.    Chronic Pain: Patient is currently taking Gabapentin and Norco for her pain. Patient is followed by Pain Management.    LABS: 1/22  COVID: REFUSED  FLU: REFUSED  MAMMO: 11/18  DEXA: 11/21  COLON: 11/18    Medical History: has a past medical history of CKD (chronic kidney disease), COPD (chronic obstructive pulmonary disease) ([facility]), Elevated glucose (04/14/2021), GERD (gastroesophageal reflux disease), History of 2019 novel coronavirus disease (COVID-19) (04/14/2021), Hyperlipidemia, Hypothyroidism,  "Neuropathic pain, and Neuropathy.   Surgical History: has a past surgical history that includes Anterior cruciate ligament repair; Hysterectomy; Tubal ligation; Gallbladder surgery; and Eye surgery.   Family History: family history includes Cancer in her father, maternal grandmother, mother, and sister.   Social History: reports that she quit smoking about 4 years ago. Her smoking use included cigarettes. She has a 80.00 pack-year smoking history. She has never used smokeless tobacco. She reports that she does not drink alcohol and does not use drugs.    Allergies: Tramadol, Prednisone, Codeine, Diazepam, and Venlafaxine    Health Maintenance Due   Topic Date Due   • LUNG CANCER SCREENING  Never done       Immunization History   Administered Date(s) Administered   • Tdap 01/01/2020       Objective     Vitals:    04/28/22 1044   BP: 127/53   BP Location: Left arm   Patient Position: Sitting   Cuff Size: Adult   Pulse: 55   SpO2: 98%   Weight: 73.4 kg (161 lb 12.8 oz)   Height: 149.9 cm (59\")     Body mass index is 32.68 kg/m².     Wt Readings from Last 3 Encounters:   04/28/22 73.4 kg (161 lb 12.8 oz)   01/04/22 69.4 kg (153 lb)   11/04/21 68.3 kg (150 lb 9.6 oz)     BP Readings from Last 3 Encounters:   04/28/22 127/53   01/04/22 121/62   11/04/21 132/62       BMI 32.68: class I obesity: The following options were offered after discussion: weight loss educational material (shared in after visit summary)    Patient Care Team:  Lina Nguyen PA as PCP - General (Family Medicine)    Physical Exam  Vitals and nursing note reviewed.   Constitutional:       Appearance: Normal appearance. She is obese.   HENT:      Head: Normocephalic and atraumatic.   Neck:      Vascular: No carotid bruit.      Comments: Thyroid : gland size normal, nontender, no nodules or masses present on palpation   Cardiovascular:      Rate and Rhythm: Normal rate and regular rhythm.      Pulses: Normal pulses.      Heart sounds: Normal " heart sounds.   Pulmonary:      Effort: Pulmonary effort is normal.      Breath sounds: Normal breath sounds.   Musculoskeletal:      Cervical back: Neck supple. No tenderness.      Right lower leg: No edema.      Left lower leg: No edema.   Lymphadenopathy:      Cervical: No cervical adenopathy.   Neurological:      Mental Status: She is alert.   Psychiatric:         Mood and Affect: Mood normal.         Behavior: Behavior normal.           Result Review :                           Assessment and Plan      Diagnoses and all orders for this visit:    1. Weight gain (Primary)  Comments:  Will check labs.  Orders:  -     CBC & Differential; Future    2. Reactive airway disease without complication, unspecified asthma severity, unspecified whether persistent  Comments:  Stable: continue with Albuterol inhaler as needed; continued follow up with Pulmonology.  Orders:  -     CBC & Differential; Future  -     albuterol sulfate  (90 Base) MCG/ACT inhaler; Inhale 2 puffs Every 6 (Six) Hours As Needed for Wheezing.  Dispense: 18 g; Refill: 1    3. Hypothyroidism, unspecified type  Comments:  Currently stable: will continue with Levothyroxine 50mcg daily; check labs and follow up in 6mths  Orders:  -     TSH; Future  -     T4, Free; Future  -     levothyroxine sodium (TIROSINT) 50 MCG capsule; Take 1 capsule by mouth Daily.  Dispense: 90 capsule; Refill: 1    4. Gastroesophageal reflux disease, unspecified whether esophagitis present  Comments:  Stable on Omeprazole 20mg daily; check labs and follow up in 6mths.  Orders:  -     CBC & Differential; Future  -     omeprazole (priLOSEC) 20 MG capsule; Take 1 capsule by mouth Daily.  Dispense: 90 capsule; Refill: 1    5. OAB (overactive bladder)  Comments:  Stable on Oxybutynin XL 10mg daily; check ua and follow up in 6mths.  Orders:  -     Urinalysis With Culture If Indicated -; Future  -     oxybutynin XL (DITROPAN-XL) 10 MG 24 hr tablet; Take 2 tablets by mouth Daily.   Dispense: 180 tablet; Refill: 1    6. Hyperlipidemia, unspecified hyperlipidemia type  Comments:  Stable on Zocor 20mg daily; check labs and follow up in 6mths.  Orders:  -     Comprehensive Metabolic Panel; Future  -     Lipid Panel; Future  -     simvastatin (ZOCOR) 20 MG tablet; Take 1 tablet by mouth Every Night.  Dispense: 90 tablet; Refill: 1    7. Class 1 obesity due to excess calories with serious comorbidity and body mass index (BMI) of 32.0 to 32.9 in adult    8. Other fatigue  Comments:  Check labs.  Orders:  -     Vitamin B12 & Folate; Future  -     Vitamin D 25 Hydroxy; Future    9. Elevated glucose  Comments:  Check labs.  Orders:  -     Hemoglobin A1c; Future    10. Vitamin D deficiency  Comments:  Continue with OTC supplement; check labs for stability.  Orders:  -     Vitamin D 25 Hydroxy; Future            Follow Up     Return in about 6 months (around 10/28/2022).    Patient was given instructions and counseling regarding her condition or for health maintenance advice. Please see specific information pulled into the AVS if appropriate.

## 2022-04-30 LAB — BACTERIA SPEC AEROBE CULT: ABNORMAL

## 2022-05-02 RX ORDER — LEVOTHYROXINE SODIUM 75 UG/1
75 CAPSULE ORAL DAILY
Qty: 90 CAPSULE | Refills: 0 | Status: SHIPPED | OUTPATIENT
Start: 2022-05-02 | End: 2022-08-01 | Stop reason: SDUPTHER

## 2022-05-02 RX ORDER — NITROFURANTOIN 25; 75 MG/1; MG/1
100 CAPSULE ORAL 2 TIMES DAILY
Qty: 14 CAPSULE | Refills: 0 | Status: SHIPPED | OUTPATIENT
Start: 2022-05-02 | End: 2022-11-30

## 2022-05-31 ENCOUNTER — TELEPHONE (OUTPATIENT)
Dept: FAMILY MEDICINE CLINIC | Facility: CLINIC | Age: 67
End: 2022-05-31

## 2022-05-31 NOTE — TELEPHONE ENCOUNTER
Caller: Veda Peña    Relationship: Self    Best call back number: [phone]    What is the best time to reach you: ANY     Who are you requesting to speak with (clinical staff, provider,  specific staff member): CLINICAL TEAM    What was the call regarding: PATIENT STATED THAT SHE HAS LEFT SHOULDER PAIN IN THE BACK OF HER SHOULDER. SHE WAS GARDENING AND HAS LIMITED RANGE OF MOTION WITH GRINDING AND POPPING. SHE HAS BEEN ALTERNATING HEAT AND ICE. WANTING TO DISCUSS OTHER OPTIONS TO CARE FOR THIS OR IF AN APPOINTMENT IS REQUIRED.     Do you require a callback: YES

## 2022-06-01 ENCOUNTER — HOSPITAL ENCOUNTER (EMERGENCY)
Facility: HOSPITAL | Age: 67
Discharge: HOME | End: 2022-06-01
Attending: EMERGENCY MEDICINE | Admitting: EMERGENCY MEDICINE

## 2022-06-01 ENCOUNTER — APPOINTMENT (OUTPATIENT)
Dept: GENERAL RADIOLOGY | Facility: HOSPITAL | Age: 67
End: 2022-06-01

## 2022-06-01 VITALS
SYSTOLIC BLOOD PRESSURE: 112 MMHG | HEART RATE: 76 BPM | DIASTOLIC BLOOD PRESSURE: 73 MMHG | WEIGHT: 158.07 LBS | OXYGEN SATURATION: 98 % | HEIGHT: 59 IN | RESPIRATION RATE: 18 BRPM | BODY MASS INDEX: 31.87 KG/M2 | TEMPERATURE: 98.1 F

## 2022-06-01 DIAGNOSIS — M25.512 ACUTE PAIN OF LEFT SHOULDER: Primary | ICD-10-CM

## 2022-06-01 PROCEDURE — 99283 EMERGENCY DEPT VISIT LOW MDM: CPT

## 2022-06-01 PROCEDURE — 71045 X-RAY EXAM CHEST 1 VIEW: CPT

## 2022-06-01 PROCEDURE — 73030 X-RAY EXAM OF SHOULDER: CPT

## 2022-06-01 NOTE — DISCHARGE INSTRUCTIONS
There were no fractures identified on your x-rays today.  It is possible that you injured your rotator cuff.  Continue to take your pain medication as prescribed and apply ice and/or heat as tolerated.    Please schedule an appointment with your orthopedic specialist for further evaluation and treatment of your symptoms.    Return for worsening symptoms or any new concerns.

## 2022-06-01 NOTE — ED PROVIDER NOTES
Time: 10:07 AM EDT  Arrived by: ALEJANDRA  Chief Complaint: Left shoulder injury  History provided by: Patient    History of Present Illness:  Patient is a 67 y.o. year old female that presents to the emergency department with complaint of left shoulder pain that started 3 days ago while working in her garden.  Patient states that she was pulling some weeds and when pulling the hose towards her felt a pop in the vicinity of her left shoulder.  Initially did not feel too bad and she mowed her yard with a push mower the next day.  Since then the pain has progressively worsened and she has significant pain when moving her arm.  She is left-hand dominant.  She denies any numbness or tingling in her arm.  She states that pain radiates to her left shoulder blade and chest wall.  She states pain is better when she is remains perfectly still.       used: No    Shoulder Injury  Location:  Left shoulder  Severity:  Moderate  Onset quality:  Sudden  Duration:  3 days  Timing:  Constant  Progression:  Worsening  Chronicity:  New  Context:  Working on flowers, while hoeing felt a pop under in left axilla  Relieved by:  Improved with ice  Worsened by:  Movement  Ineffective treatments:  Hydrocodone, gabapentin, ibuprofen, tylenol          Similar Symptoms Previously: No  Recently seen: No      Patient Care Team  Primary Care Provider: ANA Mullins    Past Medical History:     Allergies   Allergen Reactions   • Tramadol Nausea And Vomiting   • Prednisone Unknown - Low Severity   • Codeine Palpitations   • Diazepam Anxiety   • Venlafaxine Nausea And Vomiting     Past Medical History:   Diagnosis Date   • CKD (chronic kidney disease)    • COPD (chronic obstructive pulmonary disease) ([facility])    • Elevated glucose 04/14/2021   • GERD (gastroesophageal reflux disease)    • History of 2019 novel coronavirus disease (COVID-19) 04/14/2021   • Hyperlipidemia    • Hypothyroidism    • Neuropathic pain    • Neuropathy       Past Surgical History:   Procedure Laterality Date   • EYE SURGERY      cataract surgery of both eyes    • GALLBLADDER SURGERY     • HYSTERECTOMY     • KNEE ACL RECONSTRUCTION     • TUBAL ABDOMINAL LIGATION       Family History   Problem Relation Age of Onset   • Cancer Mother    • Cancer Father    • Cancer Sister    • Cancer Maternal Grandmother    • Unknown Other    • Unknown Other    • Unknown Other    • Unknown Other    • Unknown Other        Home Medications:  Prior to Admission medications    Medication Sig Start Date End Date Taking? Authorizing Provider   albuterol sulfate  (90 Base) MCG/ACT inhaler Inhale 2 puffs Every 6 (Six) Hours As Needed for Wheezing. 4/28/22   Lina Nguyen PA   Cholecalciferol (Vitamin D3) 75 MCG (3000 UT) tablet Take 1 tablet by mouth Every Other Day.    Clifford Hollis MD   fluorouracil (EFUDEX) 5 % cream fluorouracil 5 % topical cream   APPLY TOPICALLY TO FACE TWICE DAILY FOR 2 WEEKS THEN STOP    Clifford Hollis MD   gabapentin (NEURONTIN) 300 MG capsule Take 1 capsule by mouth 2 (two) times a day. 6/14/21   Clifford Hollis MD   HYDROcodone-acetaminophen (NORCO)  MG per tablet hydrocodone-acetaminophen  mg oral tablet take 2.5 tablets by oral route daily   Suspended    Clifford Hollis MD   levothyroxine sodium (TIROSINT) 75 MCG capsule Take 1 capsule by mouth Daily. 5/2/22   Lina Nguyen PA   multivitamin with minerals tablet tablet Take 1 tablet by mouth Daily.    Clifford Hollis MD   nitrofurantoin, macrocrystal-monohydrate, (Macrobid) 100 MG capsule Take 1 capsule by mouth 2 (Two) Times a Day. 5/2/22   Lina Nguyen PA   omeprazole (priLOSEC) 20 MG capsule Take 1 capsule by mouth Daily. 4/28/22   Lina Nguyen PA   oxybutynin XL (DITROPAN-XL) 10 MG 24 hr tablet Take 2 tablets by mouth Daily. 4/28/22   Lina Nguyen PA   Senna-Natural Laxatives (SENOKOT LAXATIVE PO) Take 1  "tablet by mouth Every Night.    Provider, MD Clifford   simvastatin (ZOCOR) 20 MG tablet Take 1 tablet by mouth Every Night. 4/28/22   Lina Nguyen PA        Social History:   PT  reports that she quit smoking about 4 years ago. Her smoking use included cigarettes. She has a 80.00 pack-year smoking history. She has never used smokeless tobacco. She reports that she does not drink alcohol and does not use drugs.    Record Review:  I have reviewed the patient's records in [facility].     Review of Systems  Review of Systems   Musculoskeletal:        Left shoulder pain   All other systems reviewed and are negative.       Physical Exam    /73   Pulse 76   Temp 98.1 °F (36.7 °C)   Resp 18   Ht 149.9 cm (59\")   Wt 71.7 kg (158 lb 1.1 oz)   SpO2 98%   BMI 31.93 kg/m²     Physical Exam  Vitals and nursing note reviewed.   Constitutional:       General: She is not in acute distress.     Appearance: Normal appearance. She is not ill-appearing or toxic-appearing.      Comments: Appears uncomfortable   HENT:      Head: Normocephalic and atraumatic.      Mouth/Throat:      Mouth: Mucous membranes are moist.   Eyes:      General: No scleral icterus.  Cardiovascular:      Rate and Rhythm: Normal rate and regular rhythm.      Pulses:           Radial pulses are 2+ on the right side and 2+ on the left side.      Heart sounds: Normal heart sounds.   Pulmonary:      Effort: Pulmonary effort is normal. No respiratory distress.      Breath sounds: Normal breath sounds. No wheezing or rhonchi.   Abdominal:      General: Abdomen is protuberant. Bowel sounds are normal.      Palpations: Abdomen is soft.      Tenderness: There is no abdominal tenderness.   Musculoskeletal:      Left shoulder: Tenderness present. Decreased range of motion. Normal pulse.      Cervical back: Normal range of motion and neck supple.      Comments: Significant tenderness with palpation of AC joint, muscular tenderness over trapezius, pain " "over left lateral chest wall with movement of arm.   Skin:     General: Skin is warm and dry.   Neurological:      Mental Status: She is alert and oriented to person, place, and time. Mental status is at baseline.                  ED Course  /73   Pulse 76   Temp 98.1 °F (36.7 °C)   Resp 18   Ht 149.9 cm (59\")   Wt 71.7 kg (158 lb 1.1 oz)   SpO2 98%   BMI 31.93 kg/m²   Results for orders placed or performed in visit on 04/28/22   Urine Culture - Urine, Urine, Clean Catch    Specimen: Urine, Clean Catch   Result Value Ref Range    Urine Culture >100,000 CFU/mL Escherichia coli (A)        Susceptibility    Escherichia coli - JESSICA     Ampicillin  Susceptible ug/ml     Ampicillin + Sulbactam  Susceptible ug/ml     Cefazolin  Susceptible ug/ml     Cefepime  Susceptible ug/ml     Ceftazidime  Susceptible ug/ml     Ceftriaxone  Susceptible ug/ml     Gentamicin  Susceptible ug/ml     Levofloxacin  Susceptible ug/ml     Nitrofurantoin  Susceptible ug/ml     Piperacillin + Tazobactam  Susceptible ug/ml     Trimethoprim + Sulfamethoxazole  Susceptible ug/ml   Comprehensive Metabolic Panel    Specimen: Blood   Result Value Ref Range    Glucose 97 65 - 99 mg/dL    BUN 16 8 - 23 mg/dL    Creatinine 0.99 0.57 - 1.00 mg/dL    Sodium 141 136 - 145 mmol/L    Potassium 4.7 3.5 - 5.2 mmol/L    Chloride 102 98 - 107 mmol/L    CO2 24.5 22.0 - 29.0 mmol/L    Calcium 10.0 8.6 - 10.5 mg/dL    Total Protein 7.5 6.0 - 8.5 g/dL    Albumin 4.70 3.50 - 5.20 g/dL    ALT (SGPT) 19 1 - 33 U/L    AST (SGOT) 22 1 - 32 U/L    Alkaline Phosphatase 76 39 - 117 U/L    Total Bilirubin 0.4 0.0 - 1.2 mg/dL    Globulin 2.8 gm/dL    A/G Ratio 1.7 g/dL    BUN/Creatinine Ratio 16.2 7.0 - 25.0    Anion Gap 14.5 5.0 - 15.0 mmol/L    eGFR 63.0 >60.0 mL/min/1.73   Lipid Panel    Specimen: Blood   Result Value Ref Range    Total Cholesterol 171 0 - 200 mg/dL    Triglycerides 129 0 - 150 mg/dL    HDL Cholesterol 52 40 - 60 mg/dL    LDL Cholesterol  96 0 - " 100 mg/dL    VLDL Cholesterol 23 5 - 40 mg/dL    LDL/HDL Ratio 1.79    TSH    Specimen: Blood   Result Value Ref Range    TSH 6.060 (H) 0.270 - 4.200 uIU/mL   T4, Free    Specimen: Blood   Result Value Ref Range    Free T4 1.04 0.93 - 1.70 ng/dL   Urinalysis With Culture If Indicated - Urine, Clean Catch    Specimen: Urine, Clean Catch   Result Value Ref Range    Color, UA Yellow Yellow, Straw    Appearance, UA Clear Clear    pH, UA 7.0 5.0 - 8.0    Specific Gravity, UA 1.020 1.005 - 1.030    Glucose, UA Negative Negative    Ketones, UA Negative Negative    Bilirubin, UA Negative Negative    Blood, UA Negative Negative    Protein, UA Negative Negative    Leuk Esterase, UA Moderate (2+) (A) Negative    Nitrite, UA Positive (A) Negative    Urobilinogen, UA 0.2 E.U./dL 0.2 - 1.0 E.U./dL   Vitamin B12 & Folate    Specimen: Blood   Result Value Ref Range    Folate >20.00 4.78 - 24.20 ng/mL    Vitamin B-12 648 211 - 946 pg/mL   Vitamin D 25 Hydroxy    Specimen: Blood   Result Value Ref Range    25 Hydroxy, Vitamin D 57.8 30.0 - 100.0 ng/ml   Hemoglobin A1c    Specimen: Blood   Result Value Ref Range    Hemoglobin A1C 5.80 (H) 4.80 - 5.60 %   CBC Auto Differential    Specimen: Blood   Result Value Ref Range    WBC 7.10 3.40 - 10.80 10*3/mm3    RBC 4.96 3.77 - 5.28 10*6/mm3    Hemoglobin 12.6 12.0 - 15.9 g/dL    Hematocrit 39.1 34.0 - 46.6 %    MCV 78.8 (L) 79.0 - 97.0 fL    MCH 25.4 (L) 26.6 - 33.0 pg    MCHC 32.2 31.5 - 35.7 g/dL    RDW 14.4 12.3 - 15.4 %    RDW-SD 40.8 37.0 - 54.0 fl    MPV 11.1 6.0 - 12.0 fL    Platelets 260 140 - 450 10*3/mm3    Neutrophil % 58.0 42.7 - 76.0 %    Lymphocyte % 32.3 19.6 - 45.3 %    Monocyte % 7.0 5.0 - 12.0 %    Eosinophil % 1.5 0.3 - 6.2 %    Basophil % 0.6 0.0 - 1.5 %    Immature Grans % 0.6 (H) 0.0 - 0.5 %    Neutrophils, Absolute 4.12 1.70 - 7.00 10*3/mm3    Lymphocytes, Absolute 2.29 0.70 - 3.10 10*3/mm3    Monocytes, Absolute 0.50 0.10 - 0.90 10*3/mm3    Eosinophils, Absolute 0.11  0.00 - 0.40 10*3/mm3    Basophils, Absolute 0.04 0.00 - 0.20 10*3/mm3    Immature Grans, Absolute 0.04 0.00 - 0.05 10*3/mm3    nRBC 0.0 0.0 - 0.2 /100 WBC   Urinalysis, Microscopic Only - Urine, Clean Catch    Specimen: Urine, Clean Catch   Result Value Ref Range    RBC, UA 0-2 None Seen, 0-2 /HPF    WBC, UA Too Numerous to Count (A) None Seen, 0-2 /HPF    Bacteria, UA 4+ (A) None Seen /HPF    Squamous Epithelial Cells, UA 0-2 None Seen, 0-2 /HPF    Hyaline Casts, UA None Seen None Seen /LPF    Calcium Oxalate Crystals, UA Small/1+ None Seen /HPF    Methodology Manual Light Microscopy      Medications - No data to display  XR Shoulder 2+ View Left    Result Date: 6/1/2022  Narrative: PROCEDURE: XR SHOULDER 2+ VW LEFT  COMPARISON: None  INDICATIONS: Left shoulder pain.  No injury.  FINDINGS:  No evidence of acute fracture or dislocation.  Mild age-appropriate hypertrophic degenerative changes at the acromioclavicular joint.  The glenohumeral joint space is well maintained.  The included lung fields are clear.  Calcified atherosclerotic disease in the aortic arch.      Impression:  Mild age-appropriate DJD at the AC joint, without radiographic evidence of acute fracture or dislocation.      YANI JACKSON MD       Electronically Signed and Approved By: YANI JACKSON MD on 6/01/2022 at 10:31             XR Chest 1 View    Result Date: 6/1/2022  Narrative: PROCEDURE: XR CHEST 1 VW  COMPARISON: None  INDICATIONS: left shoulder and chest pain, no injury  FINDINGS:  LUNGS: Normal.  No significant pulmonary parenchymal abnormalities.  VASCULATURE: Normal.  Unremarkable pulmonary vasculature.  CARDIAC: Normal.  No cardiac silhouette abnormality or cardiomegaly.  MEDIASTINUM: Normal.  No visible mass or adenopathy.  PLEURA: Normal.  No effusion or pleural thickening.  BONES: Normal.  No fracture or visible bony lesion.  OTHER: Negative.       Impression:  No acute cardiopulmonary process identified.       HERIBERTO NOE MD        Electronically Signed and Approved By: HERIBERTO NOE MD on 6/01/2022 at 10:25               Procedures/EKGs:  Procedures      Medical Decision Making:                     MDM  Number of Diagnoses or Management Options  Acute pain of left shoulder  Diagnosis management comments: No fractures were identified on the x-rays.  I think the patient likely injured her rotator cuff.  Patient was placed into a sling and swath which provided significant pain relief.  She had taken hydrocodone prior to arrival. I offered patient additional pain medication, however she declined.  She stated that she wanted to make sure there was not anything broken.  I advised the patient to arrange follow-up with orthopedics for further evaluation.  I do not believe that the patient has an acute emergency medical condition requiring additional emergency management at this time. The patient is currently stable for outpatient treatment and continuation of care. Important signs and symptoms that would warrant return to the emergency department were reviewed. The patient was provided the opportunity to ask questions. All questions were addressed and the patient was discharged from the ED. The patient demonstrated understanding and agreed to plan.       Amount and/or Complexity of Data Reviewed  Tests in the radiology section of CPT®: reviewed and ordered         Final diagnoses:   Acute pain of left shoulder        Disposition:  ED Disposition     ED Disposition   Discharge    Condition   Stable    Comment   --               Part of this note may be an electronic transcription/translation of spoken language to printed text using the Dragon Dictation System.      Alysia Rm, APRN  06/01/22 1122

## 2022-06-21 ENCOUNTER — OFFICE VISIT (OUTPATIENT)
Dept: ORTHOPEDIC SURGERY | Facility: CLINIC | Age: 67
End: 2022-06-21

## 2022-06-21 VITALS — BODY MASS INDEX: 32.09 KG/M2 | HEIGHT: 59 IN | HEART RATE: 74 BPM | OXYGEN SATURATION: 97 % | WEIGHT: 159.2 LBS

## 2022-06-21 DIAGNOSIS — M25.512 LEFT SHOULDER PAIN, UNSPECIFIED CHRONICITY: Primary | ICD-10-CM

## 2022-06-21 PROCEDURE — 99213 OFFICE O/P EST LOW 20 MIN: CPT | Performed by: ORTHOPAEDIC SURGERY

## 2022-06-21 NOTE — PROGRESS NOTES
"Chief Complaint  Initial Evaluation of the Left Shoulder     Subjective      Veda Peña presents to [facility] for an evaluation of left shoulder. Patient was using a ho, digging weeds out and she got a tree root. She pulled it and injured the left shoulder. This occurred around 22. She went to the ER a few days later because pain remained persistent. She was placed into a sling but she discontinue this. She reports she had pain and stiffness to the shoulder. Pain and motion has improved some. Most of her pain is posteriorly.     Allergies   Allergen Reactions   • Tramadol Nausea And Vomiting   • Prednisone Unknown - Low Severity   • Codeine Palpitations   • Diazepam Anxiety   • Venlafaxine Nausea And Vomiting        Social History     Socioeconomic History   • Marital status:    Tobacco Use   • Smoking status: Former Smoker     Packs/day: 2.00     Years: 40.00     Pack years: 80.00     Types: Cigarettes     Quit date: 3/26/2018     Years since quittin.2   • Smokeless tobacco: Never Used   Vaping Use   • Vaping Use: Every day   • Substances: Flavoring   • Devices: Pre-filled or refillable cartridge, Refillable tank   Substance and Sexual Activity   • Alcohol use: Never   • Drug use: Never   • Sexual activity: Defer        Review of Systems     Objective   Vital Signs:   Pulse 74   Ht 149.9 cm (59\")   Wt 72.2 kg (159 lb 3.2 oz)   SpO2 97%   BMI 32.15 kg/m²       Physical Exam  Constitutional:       Appearance: Normal appearance. Patient is well-developed and normal weight.   HENT:      Head: Normocephalic.      Right Ear: Hearing and external ear normal.      Left Ear: Hearing and external ear normal.      Nose: Nose normal.   Eyes:      Conjunctiva/sclera: Conjunctivae normal.   Cardiovascular:      Rate and Rhythm: Normal rate.   Pulmonary:      Effort: Pulmonary effort is normal.      Breath sounds: No wheezing or rales.   Abdominal:      Palpations: Abdomen is " soft.      Tenderness: There is no abdominal tenderness.   Musculoskeletal:      Cervical back: Normal range of motion.   Skin:     Findings: No rash.   Neurological:      Mental Status: Patient is alert and oriented to person, place, and time.   Psychiatric:         Mood and Affect: Mood and affect normal.         Judgment: Judgment normal.       Ortho Exam      LEFT SHOULDER: Good tone of deltoid, biceps, triceps, wrist extensors, and wrist flexors.  Full forward elevation. Abduction to 90 degrees. IR to back pocket. Full cross body adduction with mild pain. Full elbow flexion and extension. Sensation grossly intact. Neurovascular intact.  Radial pulse 2+, ulnar pulse 2+.       Procedures      Imaging Results (Most Recent)     None           Result Review :       XR Shoulder 2+ View Left    Result Date: 6/1/2022  Narrative: PROCEDURE: XR SHOULDER 2+ VW LEFT  COMPARISON: None  INDICATIONS: Left shoulder pain.  No injury.  FINDINGS:  No evidence of acute fracture or dislocation.  Mild age-appropriate hypertrophic degenerative changes at the acromioclavicular joint.  The glenohumeral joint space is well maintained.  The included lung fields are clear.  Calcified atherosclerotic disease in the aortic arch.      Impression:  Mild age-appropriate DJD at the AC joint, without radiographic evidence of acute fracture or dislocation.      YANI JACKSON MD       Electronically Signed and Approved By: YANI JACKSON MD on 6/01/2022 at 10:31                         Assessment and Plan     Diagnoses and all orders for this visit:    1. Posterior shoulder pain, left  (Primary)        Medication, therapy, at home exercises discussed. She opted for at home stretching exercises. See how she does with this. She takes tylenol inbetween her pain medication.  She has kidney disease and can't take NSAIDs.     Call or return if worsening symptoms.    Follow Up     Prn.      Patient was given instructions and counseling regarding her  condition or for health maintenance advice. Please see specific information pulled into the AVS if appropriate.     Scribed for Agustina Leiva MD by Megan James.  06/21/22   08:40 EDT    I have personally performed the services described in this document as scribed by the above individual and it is both accurate and complete. Agustina Leiva MD 06/21/22

## 2022-06-27 ENCOUNTER — TELEPHONE (OUTPATIENT)
Dept: FAMILY MEDICINE CLINIC | Facility: CLINIC | Age: 67
End: 2022-06-27

## 2022-06-27 ENCOUNTER — LAB (OUTPATIENT)
Dept: LAB | Facility: HOSPITAL | Age: 67
End: 2022-06-27

## 2022-06-27 DIAGNOSIS — E03.9 HYPOTHYROIDISM, UNSPECIFIED TYPE: Chronic | ICD-10-CM

## 2022-06-27 DIAGNOSIS — E03.9 HYPOTHYROIDISM, UNSPECIFIED TYPE: Primary | ICD-10-CM

## 2022-06-27 LAB
T4 FREE SERPL-MCNC: 1.65 NG/DL (ref 0.93–1.7)
TSH SERPL DL<=0.05 MIU/L-ACNC: 0.22 UIU/ML (ref 0.27–4.2)

## 2022-06-27 PROCEDURE — 84443 ASSAY THYROID STIM HORMONE: CPT

## 2022-06-27 PROCEDURE — 84439 ASSAY OF FREE THYROXINE: CPT

## 2022-06-27 PROCEDURE — 36415 COLL VENOUS BLD VENIPUNCTURE: CPT

## 2022-06-27 NOTE — TELEPHONE ENCOUNTER
----- Message from FREDERICK Sterling sent at 6/27/2022 12:37 PM EDT -----  TSH is a bit low but T4 is normal. Continue with current dose of levothyroxine and recheck in 2-3 months. (Order already placed).

## 2022-08-01 DIAGNOSIS — E03.9 HYPOTHYROIDISM, UNSPECIFIED TYPE: Chronic | ICD-10-CM

## 2022-08-01 RX ORDER — LEVOTHYROXINE SODIUM 75 UG/1
75 CAPSULE ORAL DAILY
Qty: 90 CAPSULE | Refills: 0 | Status: SHIPPED | OUTPATIENT
Start: 2022-08-01 | End: 2022-09-28 | Stop reason: SDUPTHER

## 2022-08-01 NOTE — TELEPHONE ENCOUNTER
----- Message from Veda Peña sent at 8/1/2022  7:47 AM EDT -----  Regarding: Need refill for the levothyroxine   Out of my levothyroxine, since Friday the 29th of July,  been taking the leftover 50mcg until the refill for the 75mcg..thank you

## 2022-08-03 DIAGNOSIS — E03.9 HYPOTHYROIDISM, UNSPECIFIED TYPE: Chronic | ICD-10-CM

## 2022-08-03 NOTE — TELEPHONE ENCOUNTER
Caller: Veda Peña MARGARET    Relationship: Self    Best call back number: [phone]    Requested Prescriptions:   Requested Prescriptions     Pending Prescriptions Disp Refills   • levothyroxine sodium (TIROSINT) 75 MCG capsule 90 capsule 0     Sig: Take 1 capsule by mouth Daily.        Pharmacy where request should be sent: [facility] ([place]) - [address] - [phone]  - [phone]     Additional details provided by patient: PATIENT CHECKED WITH HER PHARMACY TODAY 08/03/2022 AND THEY DO NOT HAVE THIS PRESCRIPTION. PATIENT STATES THAT SHE IS COMPLETLEY OUT     Does the patient have less than a 3 day supply:  [x] Yes  [] No    Jie Conti   08/03/22 10:05 EDT

## 2022-08-04 RX ORDER — LEVOTHYROXINE SODIUM 75 UG/1
75 CAPSULE ORAL DAILY
Qty: 90 CAPSULE | Refills: 0 | OUTPATIENT
Start: 2022-08-04

## 2022-08-04 NOTE — TELEPHONE ENCOUNTER
Rx sent to correct pharmacy and confirmed 8/1/22, called pharmacy and verified that they do have the Rx ready for patient to .  Patient notified.

## 2022-09-27 ENCOUNTER — LAB (OUTPATIENT)
Dept: LAB | Facility: HOSPITAL | Age: 67
End: 2022-09-27

## 2022-09-27 DIAGNOSIS — E03.9 HYPOTHYROIDISM, UNSPECIFIED TYPE: ICD-10-CM

## 2022-09-27 LAB
T4 FREE SERPL-MCNC: 1.54 NG/DL (ref 0.93–1.7)
TSH SERPL DL<=0.05 MIU/L-ACNC: 0.07 UIU/ML (ref 0.27–4.2)

## 2022-09-27 PROCEDURE — 84439 ASSAY OF FREE THYROXINE: CPT

## 2022-09-27 PROCEDURE — 84443 ASSAY THYROID STIM HORMONE: CPT

## 2022-09-27 PROCEDURE — 36415 COLL VENOUS BLD VENIPUNCTURE: CPT

## 2022-09-28 DIAGNOSIS — E03.9 HYPOTHYROIDISM, UNSPECIFIED TYPE: Primary | ICD-10-CM

## 2022-09-28 DIAGNOSIS — E03.9 HYPOTHYROIDISM, UNSPECIFIED TYPE: Chronic | ICD-10-CM

## 2022-09-28 RX ORDER — LEVOTHYROXINE SODIUM 50 UG/1
50 CAPSULE ORAL DAILY
Qty: 90 CAPSULE | Refills: 0 | Status: SHIPPED | OUTPATIENT
Start: 2022-09-28 | End: 2022-11-30 | Stop reason: SDUPTHER

## 2022-10-14 DIAGNOSIS — E78.5 HYPERLIPIDEMIA, UNSPECIFIED HYPERLIPIDEMIA TYPE: Chronic | ICD-10-CM

## 2022-10-14 RX ORDER — SIMVASTATIN 20 MG
20 TABLET ORAL NIGHTLY
Qty: 90 TABLET | Refills: 0 | Status: SHIPPED | OUTPATIENT
Start: 2022-10-14 | End: 2022-11-30 | Stop reason: SDUPTHER

## 2022-10-17 DIAGNOSIS — E03.9 HYPOTHYROIDISM, UNSPECIFIED TYPE: Chronic | ICD-10-CM

## 2022-10-17 DIAGNOSIS — N32.81 OAB (OVERACTIVE BLADDER): Chronic | ICD-10-CM

## 2022-10-17 RX ORDER — OXYBUTYNIN CHLORIDE 10 MG/1
20 TABLET, EXTENDED RELEASE ORAL DAILY
Qty: 180 TABLET | Refills: 1 | Status: SHIPPED | OUTPATIENT
Start: 2022-10-17

## 2022-10-17 NOTE — TELEPHONE ENCOUNTER
TIROSINT DENIED, LAST FILLED ON 9/28/22, REQUESTED TOO SOON  ----- Message from Lamine Mcnair sent at 10/17/2022  1:37 PM EDT -----  DEBRA SOSA

## 2022-11-28 NOTE — PROGRESS NOTES
The ABCs of the Annual Wellness Visit  Subsequent Medicare Wellness Visit    Chief Complaint   Patient presents with   • Medicare Wellness-subsequent   • Hypothyroidism   • COPD   • Hyperlipidemia      Subjective    History of Present Illness:  Veda Peña is a 67 y.o. female who presents for a Subsequent Medicare Wellness Visit and is also here today for a 6 month follow up for Hypothyroidism, COPD and HL.    Patient states that she would like to discuss Chantix to help her quit vaping. She has used Chantix in the past to help quit smoking and it worked well for her without side effects. She states her vape does have nicotine.    Hypothyroidism: Patient is currently on Levothyroxine and doing well. Patient states energy is good. Patient denies weight changes, bowel changes and changes in hair, skin and nails.    HL: Patient presents for management of hyperlipidemia. Patient is currently on Simvastatin Patient denies muscle cramps and muscle weakness. Patient is monitoring diet to help lower lipids.    COPD: Patient presents for management of COPD. Patient denies any shortness of air, cough or wheezing. Patient has/has not had any recent COPD exacerbations. Patient is followed by pulmonology.    Nausea today; denies vomiting or urninary symptoms; previous frequent UTIs; does state an acute non productive cough as well.    Mammo: 11/29/21  Colon: 11/19/18; cologuard done recently with insurance 9/22/22 negative; patient brought results.  DXA: 11/29/21    The following portions of the patient's history were reviewed and   updated as appropriate: allergies, current medications, past family history, past medical history, past social history, past surgical history and problem list.    Compared to one year ago, the patient feels her physical   health is the same.    Compared to one year ago, the patient feels her mental   health is the same.    Recent Hospitalizations:  She was not admitted to the hospital during the  last year.       Current Medical Providers:  Patient Care Team:  Lina Nguyen PA as PCP - General (Family Medicine)    Outpatient Medications Prior to Visit   Medication Sig Dispense Refill   • Cholecalciferol (Vitamin D3) 75 MCG (3000 UT) tablet Take 1 tablet by mouth Every Other Day.     • gabapentin (NEURONTIN) 300 MG capsule Take 1 capsule by mouth 2 (two) times a day.     • HYDROcodone-acetaminophen (NORCO)  MG per tablet hydrocodone-acetaminophen  mg oral tablet take 2.5 tablets by oral route daily   Suspended     • multivitamin with minerals tablet tablet Take 1 tablet by mouth Daily.     • oxybutynin XL (DITROPAN-XL) 10 MG 24 hr tablet Take 2 tablets by mouth Daily. 180 tablet 1   • Senna-Natural Laxatives (SENOKOT LAXATIVE PO) Take 1 tablet by mouth Every Night.     • albuterol sulfate  (90 Base) MCG/ACT inhaler Inhale 2 puffs Every 6 (Six) Hours As Needed for Wheezing. 18 g 1   • levothyroxine sodium (TIROSINT) 50 MCG capsule Take 1 capsule by mouth Daily. 90 capsule 0   • omeprazole (priLOSEC) 20 MG capsule Take 1 capsule by mouth Daily. 90 capsule 1   • simvastatin (ZOCOR) 20 MG tablet Take 1 tablet by mouth Every Night. 90 tablet 0   • fluorouracil (EFUDEX) 5 % cream fluorouracil 5 % topical cream   APPLY TOPICALLY TO FACE TWICE DAILY FOR 2 WEEKS THEN STOP     • nitrofurantoin, macrocrystal-monohydrate, (Macrobid) 100 MG capsule Take 1 capsule by mouth 2 (Two) Times a Day. 14 capsule 0     No facility-administered medications prior to visit.       Opioid medication/s are on active medication list.  and I have evaluated her active treatment plan and pain score trends (see table).  There were no vitals filed for this visit.  I have reviewed the chart for potential of high risk medication and harmful drug interactions in the elderly.            Aspirin is not on active medication list.  Aspirin use is not indicated based on review of current medical condition/s. Risk of harm  "outweighs potential benefits.  .    Patient Active Problem List   Diagnosis   • Chronic kidney disease   • COPD (chronic obstructive pulmonary disease) (HCC)   • Elevated glucose   • GERD (gastroesophageal reflux disease)   • History of 2019 novel coronavirus disease (COVID-19)   • Hyperlipidemia   • Hypothyroidism   • Neuropathy   • DDD (degenerative disc disease), cervical   • Primary localized osteoarthrosis of right lower leg   • Right knee pain   • Fibromyalgia   • Low back pain   • Osteoarthritis of right knee   • Class 1 obesity due to excess calories with body mass index (BMI) of 32.0 to 32.9 in adult     Advance Care Planning  Advance Directive is not on file.  ACP discussion was held with the patient during this visit. Patient does not have an advance directive, declines further assistance.          Objective    Vitals:    11/30/22 1131   BP: 117/54   Pulse: 64   SpO2: 98%   Weight: 73.9 kg (163 lb)   Height: 149.9 cm (59\")     Estimated body mass index is 32.92 kg/m² as calculated from the following:    Height as of this encounter: 149.9 cm (59\").    Weight as of this encounter: 73.9 kg (163 lb).    BMI is >= 30 and <35. (Class 1 Obesity). The following options were offered after discussion;: weight loss educational material (shared in after visit summary)      Does the patient have evidence of cognitive impairment? Yes    Physical Exam  Vitals and nursing note reviewed.   Constitutional:       Appearance: Normal appearance. She is obese.   HENT:      Head: Normocephalic and atraumatic.   Neck:      Vascular: No carotid bruit.      Comments: Thyroid : gland size normal, nontender, no nodules or masses present on palpation   Cardiovascular:      Rate and Rhythm: Normal rate and regular rhythm.      Pulses: Normal pulses.      Heart sounds: Normal heart sounds.   Pulmonary:      Effort: Pulmonary effort is normal.      Breath sounds: Normal breath sounds.   Abdominal:      Palpations: Abdomen is soft.      " Tenderness: There is no abdominal tenderness.   Musculoskeletal:      Cervical back: Neck supple. No tenderness.      Right lower leg: No edema.      Left lower leg: No edema.   Lymphadenopathy:      Cervical: No cervical adenopathy.   Neurological:      Mental Status: She is alert.   Psychiatric:         Mood and Affect: Mood normal.         Behavior: Behavior normal.                 HEALTH RISK ASSESSMENT    Smoking Status:  Social History     Tobacco Use   Smoking Status Former   • Packs/day: 2.00   • Years: 40.00   • Pack years: 80.00   • Types: Cigarettes   • Quit date: 3/26/2013   • Years since quittin.6   Smokeless Tobacco Never     Alcohol Consumption:  Social History     Substance and Sexual Activity   Alcohol Use Never     Fall Risk Screen:    STEADI Fall Risk Assessment was completed, and patient is at MODERATE risk for falls. Assessment completed on:2022    Depression Screening:  PHQ-2/PHQ-9 Depression Screening 2022   Retired PHQ-9 Total Score -   Retired Total Score -   Little Interest or Pleasure in Doing Things 0-->not at all   Feeling Down, Depressed or Hopeless 0-->not at all   PHQ-9: Brief Depression Severity Measure Score 0       Health Habits and Functional and Cognitive Screening:  Functional & Cognitive Status 2022   Do you have difficulty preparing food and eating? No   Do you have difficulty bathing yourself, getting dressed or grooming yourself? No   Do you have difficulty using the toilet? No   Do you have difficulty moving around from place to place? No   Do you have trouble with steps or getting out of a bed or a chair? No   Current Diet Unhealthy Diet   Dental Exam Up to date   Eye Exam Up to date   Exercise (times per week) 5 times per week   Current Exercises Include Walking   Do you need help using the phone?  No   Are you deaf or do you have serious difficulty hearing?  No   Do you need help with transportation? No   Do you need help shopping? No   Do you need  help preparing meals?  No   Do you need help with housework?  No   Do you need help with laundry? No   Do you need help taking your medications? No   Do you need help managing money? No   Do you ever drive or ride in a car without wearing a seat belt? No   Have you felt unusual stress, anger or loneliness in the last month? No   Who do you live with? Alone   If you need help, do you have trouble finding someone available to you? No   Have you been bothered in the last four weeks by sexual problems? No   Do you have difficulty concentrating, remembering or making decisions? Yes       Age-appropriate Screening Schedule:  Refer to the list below for future screening recommendations based on patient's age, sex and/or medical conditions. Orders for these recommended tests are listed in the plan section. The patient has been provided with a written plan.    Health Maintenance   Topic Date Due   • INFLUENZA VACCINE  03/31/2023 (Originally 8/1/2022)   • ZOSTER VACCINE (1 of 2) 11/30/2023 (Originally 5/28/2005)   • LIPID PANEL  04/28/2023   • MAMMOGRAM  11/29/2023   • DXA SCAN  11/29/2023   • TDAP/TD VACCINES (2 - Td or Tdap) 01/01/2030              Assessment & Plan   CMS Preventative Services Quick Reference  Risk Factors Identified During Encounter  Obesity/Overweight   Tobacco Use/Dependance (use dotphrase .tobaccocessation for documentation)  The above risks/problems have been discussed with the patient.  Follow up actions/plans if indicated are seen below in the Assessment/Plan Section.  Pertinent information has been shared with the patient in the After Visit Summary.    Diagnoses and all orders for this visit:    1. Medicare annual wellness visit, subsequent    2. History of tobacco use  -     CT chest low dose wo; Future    3. Encounter for screening for lung cancer    4. Encounter for screening mammogram for malignant neoplasm of breast  -     Mammo Screening Digital Tomosynthesis Bilateral With CAD; Future    5.  Hypothyroidism, unspecified type  Comments:  Currently stable: will continue with Levothyroxine 50mcg daily; check labs and follow up in 6mths  Orders:  -     TSH; Future  -     T4, Free; Future  -     levothyroxine sodium (TIROSINT) 50 MCG capsule; Take 1 capsule by mouth Daily.  Dispense: 90 capsule; Refill: 1    6. Gastroesophageal reflux disease, unspecified whether esophagitis present  Comments:  Stable on Omeprazole 20mg daily; check labs and follow up in 6mths.  Orders:  -     CBC & Differential; Future  -     omeprazole (priLOSEC) 20 MG capsule; Take 1 capsule by mouth Daily.  Dispense: 90 capsule; Refill: 1    7. Hyperlipidemia, unspecified hyperlipidemia type  Comments:  Stable on Zocor 20mg daily; check labs and follow up in 6mths.  Orders:  -     Comprehensive Metabolic Panel; Future  -     Lipid Panel; Future  -     simvastatin (ZOCOR) 20 MG tablet; Take 1 tablet by mouth Every Night.  Dispense: 90 tablet; Refill: 0    8. Elevated glucose  Comments:  Check A1c  Orders:  -     Hemoglobin A1c; Future    9. Vaping nicotine dependence, tobacco product  Comments:  Discussed cessation and stop date; start Chantix as directed.  Orders:  -     varenicline (Chantix) 0.5 MG tablet; Take one tablet daily for 3 days then one tablet twice daily for 4 days then 2 tablets twice daily to finish month  Dispense: 75 tablet; Refill: 0    10. Reactive airway disease without complication, unspecified asthma severity, unspecified whether persistent  Comments:  Stable: continue with Albuterol inhaler as needed; continued follow up with Pulmonology.  Orders:  -     albuterol sulfate  (90 Base) MCG/ACT inhaler; Inhale 2 puffs Every 6 (Six) Hours As Needed for Wheezing.  Dispense: 18 g; Refill: 1    11. Nausea  Comments:  New acute problem: U/A in office clear/negative. Check CXR to rule out pneumonia.  Orders:  -     POCT urinalysis dipstick, automated    12. Acute cough  Comments:  New acute problem: Check  CXR.  Orders:  -     XR Chest PA & Lateral; Future        Follow Up:   Return in about 6 months (around 5/30/2023).     An After Visit Summary and PPPS were made available to the patient.

## 2022-11-30 ENCOUNTER — OFFICE VISIT (OUTPATIENT)
Dept: FAMILY MEDICINE CLINIC | Facility: CLINIC | Age: 67
End: 2022-11-30

## 2022-11-30 ENCOUNTER — HOSPITAL ENCOUNTER (OUTPATIENT)
Dept: GENERAL RADIOLOGY | Facility: HOSPITAL | Age: 67
Discharge: HOME | End: 2022-11-30

## 2022-11-30 ENCOUNTER — LAB (OUTPATIENT)
Dept: LAB | Facility: HOSPITAL | Age: 67
End: 2022-11-30

## 2022-11-30 VITALS
BODY MASS INDEX: 32.86 KG/M2 | WEIGHT: 163 LBS | SYSTOLIC BLOOD PRESSURE: 117 MMHG | DIASTOLIC BLOOD PRESSURE: 54 MMHG | HEART RATE: 64 BPM | OXYGEN SATURATION: 98 % | HEIGHT: 59 IN

## 2022-11-30 DIAGNOSIS — F17.290 VAPING NICOTINE DEPENDENCE, TOBACCO PRODUCT: ICD-10-CM

## 2022-11-30 DIAGNOSIS — R73.09 ELEVATED GLUCOSE: ICD-10-CM

## 2022-11-30 DIAGNOSIS — E03.9 HYPOTHYROIDISM, UNSPECIFIED TYPE: Chronic | ICD-10-CM

## 2022-11-30 DIAGNOSIS — J45.909 REACTIVE AIRWAY DISEASE WITHOUT COMPLICATION, UNSPECIFIED ASTHMA SEVERITY, UNSPECIFIED WHETHER PERSISTENT: Chronic | ICD-10-CM

## 2022-11-30 DIAGNOSIS — R05.1 ACUTE COUGH: ICD-10-CM

## 2022-11-30 DIAGNOSIS — R11.0 NAUSEA: ICD-10-CM

## 2022-11-30 DIAGNOSIS — E78.5 HYPERLIPIDEMIA, UNSPECIFIED HYPERLIPIDEMIA TYPE: Chronic | ICD-10-CM

## 2022-11-30 DIAGNOSIS — E03.9 HYPOTHYROIDISM, UNSPECIFIED TYPE: ICD-10-CM

## 2022-11-30 DIAGNOSIS — Z87.891 HISTORY OF TOBACCO USE: ICD-10-CM

## 2022-11-30 DIAGNOSIS — Z00.00 MEDICARE ANNUAL WELLNESS VISIT, SUBSEQUENT: ICD-10-CM

## 2022-11-30 DIAGNOSIS — Z12.31 ENCOUNTER FOR SCREENING MAMMOGRAM FOR MALIGNANT NEOPLASM OF BREAST: ICD-10-CM

## 2022-11-30 DIAGNOSIS — K21.9 GASTROESOPHAGEAL REFLUX DISEASE, UNSPECIFIED WHETHER ESOPHAGITIS PRESENT: ICD-10-CM

## 2022-11-30 DIAGNOSIS — K21.9 GASTROESOPHAGEAL REFLUX DISEASE, UNSPECIFIED WHETHER ESOPHAGITIS PRESENT: Chronic | ICD-10-CM

## 2022-11-30 DIAGNOSIS — Z12.2 ENCOUNTER FOR SCREENING FOR LUNG CANCER: ICD-10-CM

## 2022-11-30 LAB
ALBUMIN SERPL-MCNC: 4.8 G/DL (ref 3.5–5.2)
ALBUMIN/GLOB SERPL: 1.8 G/DL
ALP SERPL-CCNC: 89 U/L (ref 39–117)
ALT SERPL W P-5'-P-CCNC: 18 U/L (ref 1–33)
ANION GAP SERPL CALCULATED.3IONS-SCNC: 10 MMOL/L (ref 5–15)
AST SERPL-CCNC: 24 U/L (ref 1–32)
BASOPHILS # BLD AUTO: 0.03 10*3/MM3 (ref 0–0.2)
BASOPHILS NFR BLD AUTO: 0.3 % (ref 0–1.5)
BILIRUB BLD-MCNC: NEGATIVE MG/DL
BILIRUB SERPL-MCNC: 0.4 MG/DL (ref 0–1.2)
BUN SERPL-MCNC: 12 MG/DL (ref 8–23)
BUN/CREAT SERPL: 14.5 (ref 7–25)
CALCIUM SPEC-SCNC: 10.5 MG/DL (ref 8.6–10.5)
CHLORIDE SERPL-SCNC: 99 MMOL/L (ref 98–107)
CHOLEST SERPL-MCNC: 158 MG/DL (ref 0–200)
CLARITY, POC: ABNORMAL
CO2 SERPL-SCNC: 29 MMOL/L (ref 22–29)
COLOR UR: YELLOW
CREAT SERPL-MCNC: 0.83 MG/DL (ref 0.57–1)
DEPRECATED RDW RBC AUTO: 41.5 FL (ref 37–54)
EGFRCR SERPLBLD CKD-EPI 2021: 77.4 ML/MIN/1.73
EOSINOPHIL # BLD AUTO: 0.02 10*3/MM3 (ref 0–0.4)
EOSINOPHIL NFR BLD AUTO: 0.2 % (ref 0.3–6.2)
ERYTHROCYTE [DISTWIDTH] IN BLOOD BY AUTOMATED COUNT: 15.3 % (ref 12.3–15.4)
EXPIRATION DATE: ABNORMAL
GLOBULIN UR ELPH-MCNC: 2.7 GM/DL
GLUCOSE SERPL-MCNC: 99 MG/DL (ref 65–99)
GLUCOSE UR STRIP-MCNC: NEGATIVE MG/DL
HCT VFR BLD AUTO: 41.1 % (ref 34–46.6)
HDLC SERPL-MCNC: 51 MG/DL (ref 40–60)
HGB BLD-MCNC: 12.5 G/DL (ref 12–15.9)
IMM GRANULOCYTES # BLD AUTO: 0.07 10*3/MM3 (ref 0–0.05)
IMM GRANULOCYTES NFR BLD AUTO: 0.8 % (ref 0–0.5)
KETONES UR QL: NEGATIVE
LDLC SERPL CALC-MCNC: 90 MG/DL (ref 0–100)
LDLC/HDLC SERPL: 1.73 {RATIO}
LEUKOCYTE EST, POC: NEGATIVE
LYMPHOCYTES # BLD AUTO: 2.43 10*3/MM3 (ref 0.7–3.1)
LYMPHOCYTES NFR BLD AUTO: 26.4 % (ref 19.6–45.3)
Lab: ABNORMAL
MCH RBC QN AUTO: 23.7 PG (ref 26.6–33)
MCHC RBC AUTO-ENTMCNC: 30.4 G/DL (ref 31.5–35.7)
MCV RBC AUTO: 78 FL (ref 79–97)
MONOCYTES # BLD AUTO: 0.5 10*3/MM3 (ref 0.1–0.9)
MONOCYTES NFR BLD AUTO: 5.4 % (ref 5–12)
NEUTROPHILS NFR BLD AUTO: 6.16 10*3/MM3 (ref 1.7–7)
NEUTROPHILS NFR BLD AUTO: 66.9 % (ref 42.7–76)
NITRITE UR-MCNC: NEGATIVE MG/ML
NRBC BLD AUTO-RTO: 0 /100 WBC (ref 0–0.2)
PH UR: 7.5 [PH] (ref 5–8)
PLATELET # BLD AUTO: 329 10*3/MM3 (ref 140–450)
PMV BLD AUTO: 11.1 FL (ref 6–12)
POTASSIUM SERPL-SCNC: 4.6 MMOL/L (ref 3.5–5.2)
PROT SERPL-MCNC: 7.5 G/DL (ref 6–8.5)
PROT UR STRIP-MCNC: NEGATIVE MG/DL
RBC # BLD AUTO: 5.27 10*6/MM3 (ref 3.77–5.28)
RBC # UR STRIP: NEGATIVE /UL
SODIUM SERPL-SCNC: 138 MMOL/L (ref 136–145)
SP GR UR: 1.02 (ref 1–1.03)
T4 FREE SERPL-MCNC: 1.26 NG/DL (ref 0.93–1.7)
TRIGL SERPL-MCNC: 93 MG/DL (ref 0–150)
TSH SERPL DL<=0.05 MIU/L-ACNC: 1.49 UIU/ML (ref 0.27–4.2)
UROBILINOGEN UR QL: NORMAL
VLDLC SERPL-MCNC: 17 MG/DL (ref 5–40)
WBC NRBC COR # BLD: 9.21 10*3/MM3 (ref 3.4–10.8)

## 2022-11-30 PROCEDURE — 1159F MED LIST DOCD IN RCRD: CPT | Performed by: PHYSICIAN ASSISTANT

## 2022-11-30 PROCEDURE — 1170F FXNL STATUS ASSESSED: CPT | Performed by: PHYSICIAN ASSISTANT

## 2022-11-30 PROCEDURE — 84439 ASSAY OF FREE THYROXINE: CPT

## 2022-11-30 PROCEDURE — G0439 PPPS, SUBSEQ VISIT: HCPCS | Performed by: PHYSICIAN ASSISTANT

## 2022-11-30 PROCEDURE — 81003 URINALYSIS AUTO W/O SCOPE: CPT | Performed by: PHYSICIAN ASSISTANT

## 2022-11-30 PROCEDURE — 80061 LIPID PANEL: CPT

## 2022-11-30 PROCEDURE — 80053 COMPREHEN METABOLIC PANEL: CPT

## 2022-11-30 PROCEDURE — 99214 OFFICE O/P EST MOD 30 MIN: CPT | Performed by: PHYSICIAN ASSISTANT

## 2022-11-30 PROCEDURE — 1160F RVW MEDS BY RX/DR IN RCRD: CPT | Performed by: PHYSICIAN ASSISTANT

## 2022-11-30 PROCEDURE — 83036 HEMOGLOBIN GLYCOSYLATED A1C: CPT

## 2022-11-30 PROCEDURE — 36415 COLL VENOUS BLD VENIPUNCTURE: CPT

## 2022-11-30 PROCEDURE — 85025 COMPLETE CBC W/AUTO DIFF WBC: CPT

## 2022-11-30 PROCEDURE — 71046 X-RAY EXAM CHEST 2 VIEWS: CPT

## 2022-11-30 PROCEDURE — 84443 ASSAY THYROID STIM HORMONE: CPT

## 2022-11-30 RX ORDER — LEVOTHYROXINE SODIUM 50 UG/1
50 CAPSULE ORAL DAILY
Qty: 90 CAPSULE | Refills: 1 | Status: SHIPPED | OUTPATIENT
Start: 2022-11-30

## 2022-11-30 RX ORDER — ALBUTEROL SULFATE 90 UG/1
2 AEROSOL, METERED RESPIRATORY (INHALATION) EVERY 6 HOURS PRN
Qty: 18 G | Refills: 1 | Status: SHIPPED | OUTPATIENT
Start: 2022-11-30

## 2022-11-30 RX ORDER — OMEPRAZOLE 20 MG/1
20 CAPSULE, DELAYED RELEASE ORAL DAILY
Qty: 90 CAPSULE | Refills: 1 | Status: SHIPPED | OUTPATIENT
Start: 2022-11-30

## 2022-11-30 RX ORDER — VARENICLINE TARTRATE 0.5 MG/1
TABLET, FILM COATED ORAL
Qty: 75 TABLET | Refills: 0 | Status: SHIPPED | OUTPATIENT
Start: 2022-11-30 | End: 2023-02-01 | Stop reason: ALTCHOICE

## 2022-11-30 RX ORDER — SIMVASTATIN 20 MG
20 TABLET ORAL NIGHTLY
Qty: 90 TABLET | Refills: 0 | Status: SHIPPED | OUTPATIENT
Start: 2022-11-30 | End: 2023-04-03 | Stop reason: SDUPTHER

## 2022-11-30 NOTE — PATIENT INSTRUCTIONS
Please review the decision aid used during our discussion regarding the Low dose lung cancer screening visit today.                        Patient was educated/instructed on their diagnosis, treatment and medications prior to discharge from the clinic today. Patient advised to call the office with any questions or concerns.

## 2022-12-01 LAB — HBA1C MFR BLD: 6.1 % (ref 4.8–5.6)

## 2022-12-28 ENCOUNTER — HOSPITAL ENCOUNTER (OUTPATIENT)
Dept: CT IMAGING | Facility: HOSPITAL | Age: 67
Discharge: HOME | End: 2022-12-28
Admitting: PHYSICIAN ASSISTANT

## 2022-12-28 DIAGNOSIS — Z87.891 HISTORY OF TOBACCO USE: ICD-10-CM

## 2022-12-28 PROCEDURE — 71271 CT THORAX LUNG CANCER SCR C-: CPT

## 2022-12-29 DIAGNOSIS — I25.84 CORONARY ARTERY CALCIFICATION: Primary | ICD-10-CM

## 2022-12-29 DIAGNOSIS — I25.10 CORONARY ARTERY CALCIFICATION: Primary | ICD-10-CM

## 2023-02-01 ENCOUNTER — OFFICE VISIT (OUTPATIENT)
Dept: CARDIOLOGY | Facility: CLINIC | Age: 68
End: 2023-02-01
Payer: MEDICARE

## 2023-02-01 VITALS
HEIGHT: 59 IN | DIASTOLIC BLOOD PRESSURE: 49 MMHG | WEIGHT: 163.6 LBS | SYSTOLIC BLOOD PRESSURE: 124 MMHG | BODY MASS INDEX: 32.98 KG/M2 | HEART RATE: 81 BPM

## 2023-02-01 DIAGNOSIS — I25.84 CORONARY ARTERY CALCIFICATION: Primary | ICD-10-CM

## 2023-02-01 DIAGNOSIS — E78.2 MIXED HYPERLIPIDEMIA: ICD-10-CM

## 2023-02-01 DIAGNOSIS — I25.10 CORONARY ARTERY CALCIFICATION: Primary | ICD-10-CM

## 2023-02-01 PROBLEM — Z86.16 HISTORY OF 2019 NOVEL CORONAVIRUS DISEASE (COVID-19): Status: RESOLVED | Noted: 2021-04-14 | Resolved: 2023-02-01

## 2023-02-01 PROCEDURE — 99204 OFFICE O/P NEW MOD 45 MIN: CPT | Performed by: INTERNAL MEDICINE

## 2023-02-01 PROCEDURE — 93000 ELECTROCARDIOGRAM COMPLETE: CPT | Performed by: INTERNAL MEDICINE

## 2023-02-01 RX ORDER — ASPIRIN 81 MG/1
81 TABLET ORAL DAILY
Qty: 30 TABLET | Refills: 1
Start: 2023-02-01 | End: 2023-04-03 | Stop reason: SDUPTHER

## 2023-02-01 NOTE — ASSESSMENT & PLAN NOTE
Moderate coronary artery calcification noted on recent CT scan.  She reports some chest discomfort and shortness of breath.  EKG is abnormal showing T wave inversions in anterior leads, suggestive ischemia.  She would need further evaluation.  We will proceed with an echocardiogram to assess LV systolic and diastolic function and to rule out any significant valvular abnormalities.  Patient also be scheduled for a SPECT stress test to rule out reversible ischemia.  A SPECT is needed since baseline EKG is abnormal.  Recommend he start taking aspirin 81 mg p.o. daily.  Continue simvastatin.

## 2023-02-01 NOTE — PROGRESS NOTES
"  CARDIOLOGY INITIAL CONSULT       Chief Complaint  Hypertension, Hyperlipidemia, and Coronary artery calcification    Subjective            Veda Peña presents to [facility] CARDIOLOGY  History of Present Illness    This is a 67-year-old female with COPD, hyperlipidemia, arthritis who was referred for cardiac evaluation because of coronary artery calcification detected on CT scan of the chest.  CT scan was performed as part of screening for lung cancer.  Moderate coronary calcification was documented.  Patient reports some chest pain, described as \" cramps in the chest\", which comes randomly.  She denies any exertional increasing chest pain.  She does have shortness of breath at baseline.  Denies palpitations or dizziness.  No recent cardiac work-up available.       Past History:    Chronic obstructive pulmonary disease  Hiatal hernia  Mixed hyperlipidemia    Medical History:  Past Medical History:   Diagnosis Date   • Anemia 2021   • Anxiety 2015   • Arthritis    • Cancer (HCC) 2021-present    Skin   • Cataract 2020    Cataract surgery in both eyes   • Cholelithiasis     Surgery   • CKD (chronic kidney disease)    • Colon polyp     Colonoscopy, removed and biopsied   • COPD (chronic obstructive pulmonary disease) (HCC)    • Depression    • Elevated glucose 04/14/2021   • Fibromyalgia, primary     ?   • GERD (gastroesophageal reflux disease)    • History of 2019 novel coronavirus disease (COVID-19) 04/14/2021   • HL (hearing loss) 2021    Hearing aids   • Hyperlipidemia    • Hypothyroidism    • Irritable bowel syndrome     Constipation..ongoing   • Low back pain 2022    Lower back, across hips, and down both legs   • Neuropathic pain    • Neuropathy    • Obesity    • Osteopenia    • Renal insufficiency        Surgical History: has a past surgical history that includes Anterior cruciate ligament repair; Hysterectomy; Tubal ligation; Gallbladder surgery; Eye surgery; and Cholecystectomy. "     Family History: family history includes Cancer in her father, maternal grandmother, mother, and sister; Thyroid disease in her daughter; Vision loss in her daughter.     Social History: reports that she quit smoking about 9 years ago. Her smoking use included cigarettes. She has a 80.00 pack-year smoking history. She has never used smokeless tobacco. She reports that she does not drink alcohol and does not use drugs.    Allergies: Tramadol, Prednisone, Codeine, Diazepam, and Venlafaxine    Current Outpatient Medications on File Prior to Visit   Medication Sig   • albuterol sulfate  (90 Base) MCG/ACT inhaler Inhale 2 puffs Every 6 (Six) Hours As Needed for Wheezing.   • Cholecalciferol (Vitamin D3) 75 MCG (3000 UT) tablet Take 1 tablet by mouth Every Other Day.   • gabapentin (NEURONTIN) 300 MG capsule Take 1 capsule by mouth 2 (two) times a day.   • HYDROcodone-acetaminophen (NORCO)  MG per tablet hydrocodone-acetaminophen  mg oral tablet take 2.5 tablets by oral route daily   Suspended   • levothyroxine sodium (TIROSINT) 50 MCG capsule Take 1 capsule by mouth Daily.   • multivitamin with minerals tablet tablet Take 1 tablet by mouth Daily.   • omeprazole (priLOSEC) 20 MG capsule Take 1 capsule by mouth Daily.   • oxybutynin XL (DITROPAN-XL) 10 MG 24 hr tablet Take 2 tablets by mouth Daily.   • Senna-Natural Laxatives (SENOKOT LAXATIVE PO) Take 1 tablet by mouth Every Night.   • simvastatin (ZOCOR) 20 MG tablet Take 1 tablet by mouth Every Night.         Review of Systems   Constitutional: Negative for fatigue, unexpected weight gain and unexpected weight loss.   Eyes: Negative for double vision.   Respiratory: Positive for chest tightness and shortness of breath. Negative for cough and wheezing.    Cardiovascular: Negative for chest pain, palpitations and leg swelling.   Gastrointestinal: Negative for abdominal pain, nausea and vomiting.   Endocrine: Negative for cold intolerance, heat  "intolerance, polydipsia and polyuria.   Musculoskeletal: Negative for arthralgias and back pain.   Skin: Negative for color change.   Neurological: Negative for dizziness, syncope, weakness and headache.   Hematological: Does not bruise/bleed easily.        Objective     /49   Pulse 81   Ht 149.9 cm (59\")   Wt 74.2 kg (163 lb 9.6 oz)   BMI 33.04 kg/m²       Physical Exam  Constitutional:       General: She is awake. She is not in acute distress.     Appearance: Normal appearance.   Eyes:      Extraocular Movements: Extraocular movements intact.      Pupils: Pupils are equal, round, and reactive to light.   Neck:      Thyroid: No thyromegaly.      Vascular: No carotid bruit or JVD.   Cardiovascular:      Rate and Rhythm: Normal rate and regular rhythm.      Chest Wall: PMI is not displaced.      Heart sounds: Normal heart sounds, S1 normal and S2 normal. No murmur heard.    No friction rub. No gallop. No S3 or S4 sounds.   Pulmonary:      Effort: Pulmonary effort is normal. No respiratory distress.      Breath sounds: Wheezing present. No rhonchi or rales.   Abdominal:      General: Bowel sounds are normal.      Palpations: Abdomen is soft.      Tenderness: There is no abdominal tenderness.   Musculoskeletal:      Cervical back: Neck supple.      Right lower leg: No edema.      Left lower leg: No edema.   Skin:     Nails: There is no clubbing.   Neurological:      General: No focal deficit present.      Mental Status: She is alert and oriented to person, place, and time.           Result Review :     The following data was reviewed by: Hema English MD on 02/01/2023:    CMP    CMP 4/28/22 11/30/22   Glucose 97 99   BUN 16 12   Creatinine 0.99 0.83   eGFR 63.0 77.4   Sodium 141 138   Potassium 4.7 4.6   Chloride 102 99   Calcium 10.0 10.5   Total Protein 7.5 7.5   Albumin 4.70 4.80   Globulin 2.8 2.7   Total Bilirubin 0.4 0.4   Alkaline Phosphatase 76 89   AST (SGOT) 22 24   ALT (SGPT) 19 18 "   Albumin/Globulin Ratio 1.7 1.8   BUN/Creatinine Ratio 16.2 14.5   Anion Gap 14.5 10.0      Comments are available for some flowsheets but are not being displayed.           CBC    CBC 4/28/22 11/30/22   WBC 7.10 9.21   RBC 4.96 5.27   Hemoglobin 12.6 12.5   Hematocrit 39.1 41.1   MCV 78.8 (A) 78.0 (A)   MCH 25.4 (A) 23.7 (A)   MCHC 32.2 30.4 (A)   RDW 14.4 15.3   Platelets 260 329   (A) Abnormal value            TSH    TSH 6/27/22 9/27/22 11/30/22   TSH 0.222 (A) 0.070 (A) 1.490   (A) Abnormal value            Lipid Panel    Lipid Panel 4/28/22 11/30/22   Total Cholesterol 171 158   Triglycerides 129 93   HDL Cholesterol 52 51   VLDL Cholesterol 23 17   LDL Cholesterol  96 90   LDL/HDL Ratio 1.79 1.73              Data reviewed: Cardiology studies              ECG 12 Lead    Date/Time: 2/1/2023 3:02 PM  Performed by: Hema English MD  Authorized by: Hema English MD   Comparison: not compared with previous ECG   Previous ECG: no previous ECG available  Rhythm: sinus rhythm  Rate: normal  Conduction comments: Short TX interval.  QRS axis: normal  Other findings comments: T wave inversion/flattening in anterior leads, suggestive of ischemia    Clinical impression: abnormal EKG                Assessment and Plan        Diagnoses and all orders for this visit:    1. Coronary artery calcification (Primary)  Assessment & Plan:  Moderate coronary artery calcification noted on recent CT scan.  She reports some chest discomfort and shortness of breath.  EKG is abnormal showing T wave inversions in anterior leads, suggestive ischemia.  She would need further evaluation.  We will proceed with an echocardiogram to assess LV systolic and diastolic function and to rule out any significant valvular abnormalities.  Patient also be scheduled for a SPECT stress test to rule out reversible ischemia.  A SPECT is needed since baseline EKG is abnormal.  Recommend he start taking aspirin 81 mg p.o. daily.  Continue  simvastatin.    Orders:  -     Stress Test With Myocardial Perfusion One Day; Future  -     Adult Transthoracic Echo Complete W/ Cont if Necessary Per Protocol; Future  -     aspirin 81 MG EC tablet; Take 1 tablet by mouth Daily.  Dispense: 30 tablet; Refill: 1  -     ECG 12 Lead    2. Mixed hyperlipidemia  Assessment & Plan:  Recent LDL is 96, which is above goal considering coronary artery calcification.  She is currently on simvastatin which will be continued.  Statins may be changed to one of the high intensity statins during follow-up visits, and LDL remains elevated.          I spent 25 minutes caring for Veda on this date of service. This time includes time spent by me in the following activities:reviewing tests, obtaining and/or reviewing a separately obtained history, performing a medically appropriate examination and/or evaluation , ordering medications, tests, or procedures, and documenting information in the medical record    Follow Up     Return for Will schedule f/u after reviewing test results.    Patient was given instructions and counseling regarding her condition or for health maintenance advice. Please see specific information pulled into the AVS if appropriate.

## 2023-02-01 NOTE — ASSESSMENT & PLAN NOTE
Recent LDL is 96, which is above goal considering coronary artery calcification.  She is currently on simvastatin which will be continued.  Statins may be changed to one of the high intensity statins during follow-up visits, and LDL remains elevated.

## 2023-02-27 ENCOUNTER — HOSPITAL ENCOUNTER (OUTPATIENT)
Dept: MAMMOGRAPHY | Facility: HOSPITAL | Age: 68
Discharge: HOME | End: 2023-02-27
Admitting: PHYSICIAN ASSISTANT
Payer: MEDICARE

## 2023-02-27 DIAGNOSIS — Z12.31 ENCOUNTER FOR SCREENING MAMMOGRAM FOR MALIGNANT NEOPLASM OF BREAST: ICD-10-CM

## 2023-02-27 PROCEDURE — 77063 BREAST TOMOSYNTHESIS BI: CPT

## 2023-02-27 PROCEDURE — 77067 SCR MAMMO BI INCL CAD: CPT

## 2023-03-17 ENCOUNTER — HOSPITAL ENCOUNTER (OUTPATIENT)
Dept: CARDIOLOGY | Facility: HOSPITAL | Age: 68
Discharge: HOME | End: 2023-03-17
Payer: MEDICARE

## 2023-03-17 ENCOUNTER — HOSPITAL ENCOUNTER (OUTPATIENT)
Dept: NUCLEAR MEDICINE | Facility: HOSPITAL | Age: 68
Discharge: HOME | End: 2023-03-17
Payer: MEDICARE

## 2023-03-17 ENCOUNTER — TELEPHONE (OUTPATIENT)
Dept: CARDIOLOGY | Facility: CLINIC | Age: 68
End: 2023-03-17

## 2023-03-17 DIAGNOSIS — I25.84 CORONARY ARTERY CALCIFICATION: ICD-10-CM

## 2023-03-17 DIAGNOSIS — I25.10 CORONARY ARTERY CALCIFICATION: ICD-10-CM

## 2023-03-17 LAB
BH CV ECHO MEAS - AO ROOT DIAM: 3.1 CM
BH CV ECHO MEAS - EDV(MOD-SP2): 41 ML
BH CV ECHO MEAS - EDV(MOD-SP4): 42 ML
BH CV ECHO MEAS - EF(MOD-BP): 54.5 %
BH CV ECHO MEAS - ESV(MOD-SP2): 14 ML
BH CV ECHO MEAS - ESV(MOD-SP4): 15 ML
BH CV ECHO MEAS - IVSD: 0.8 CM
BH CV ECHO MEAS - LA DIMENSION: 3.4 CM
BH CV ECHO MEAS - LAT PEAK E' VEL: 7.2 CM/SEC
BH CV ECHO MEAS - LVIDD: 4.5 CM
BH CV ECHO MEAS - LVIDS: 2.6 CM
BH CV ECHO MEAS - LVOT DIAM: 2 CM
BH CV ECHO MEAS - LVPWD: 0.8 CM
BH CV ECHO MEAS - MED PEAK E' VEL: 8.38 CM/SEC
BH CV ECHO MEAS - MV A MAX VEL: 81 CM/SEC
BH CV ECHO MEAS - MV DEC TIME: 183 MSEC
BH CV ECHO MEAS - MV E MAX VEL: 78 CM/SEC
BH CV ECHO MEAS - MV E/A: 1
BH CV ECHO MEAS - RVDD: 2.7 CM
BH CV ECHO MEAS - TAPSE (>1.6): 1.75 CM
BH CV ECHO MEASUREMENTS AVERAGE E/E' RATIO: 10.01
BH CV IMMEDIATE POST RECOVERY TECH DATA SYMPTOMS: NORMAL
BH CV IMMEDIATE POST TECH DATA BLOOD PRESSURE: NORMAL MMHG
BH CV IMMEDIATE POST TECH DATA HEART RATE: 105 BPM
BH CV IMMEDIATE POST TECH DATA OXYGEN SATS: 98 %
BH CV REST NUCLEAR ISOTOPE DOSE: 9.4 MCI
BH CV SIX MINUTE RECOVERY TECH DATA BLOOD PRESSURE: NORMAL
BH CV SIX MINUTE RECOVERY TECH DATA HEART RATE: 88 BPM
BH CV SIX MINUTE RECOVERY TECH DATA OXYGEN SATURATION: 95 %
BH CV STRESS BP STAGE 1: NORMAL
BH CV STRESS COMMENTS STAGE 1: NORMAL
BH CV STRESS DOSE REGADENOSON STAGE 1: 0.4
BH CV STRESS DURATION MIN STAGE 1: 0
BH CV STRESS DURATION SEC STAGE 1: 10
BH CV STRESS HR STAGE 1: 70
BH CV STRESS NUCLEAR ISOTOPE DOSE: 31.6 MCI
BH CV STRESS O2 STAGE 1: 98
BH CV STRESS PROTOCOL 1: NORMAL
BH CV STRESS RECOVERY BP: NORMAL MMHG
BH CV STRESS RECOVERY HR: 88 BPM
BH CV STRESS RECOVERY O2: 95 %
BH CV STRESS STAGE 1: 1
BH CV THREE MINUTE POST TECH DATA BLOOD PRESSURE: NORMAL MMHG
BH CV THREE MINUTE POST TECH DATA HEART RATE: 92 BPM
BH CV THREE MINUTE POST TECH DATA OXYGEN SATURATION: 96 %
BH CV THREE MINUTE RECOVERY TECH DATA SYMPTOM: NORMAL
IVRT: 79 MSEC
LEFT ATRIUM VOLUME INDEX: 9.3 ML/M2
LV EF NUC BP: 76 %
MAXIMAL PREDICTED HEART RATE: 153 BPM
MAXIMAL PREDICTED HEART RATE: 153 BPM
PERCENT MAX PREDICTED HR: 68.63 %
STRESS BASELINE BP: NORMAL MMHG
STRESS BASELINE HR: 68 BPM
STRESS O2 SAT REST: 96 %
STRESS PERCENT HR: 81 %
STRESS POST O2 SAT PEAK: 98 %
STRESS POST PEAK BP: NORMAL MMHG
STRESS POST PEAK HR: 105 BPM
STRESS TARGET HR: 130 BPM
STRESS TARGET HR: 130 BPM

## 2023-03-17 PROCEDURE — 93306 TTE W/DOPPLER COMPLETE: CPT

## 2023-03-17 PROCEDURE — 93017 CV STRESS TEST TRACING ONLY: CPT

## 2023-03-17 PROCEDURE — A9502 TC99M TETROFOSMIN: HCPCS | Performed by: INTERNAL MEDICINE

## 2023-03-17 PROCEDURE — 0 TECHNETIUM TETROFOSMIN KIT: Performed by: INTERNAL MEDICINE

## 2023-03-17 PROCEDURE — 93306 TTE W/DOPPLER COMPLETE: CPT | Performed by: INTERNAL MEDICINE

## 2023-03-17 PROCEDURE — 93018 CV STRESS TEST I&R ONLY: CPT | Performed by: INTERNAL MEDICINE

## 2023-03-17 PROCEDURE — 78452 HT MUSCLE IMAGE SPECT MULT: CPT | Performed by: INTERNAL MEDICINE

## 2023-03-17 PROCEDURE — 25010000002 REGADENOSON 0.4 MG/5ML SOLUTION: Performed by: INTERNAL MEDICINE

## 2023-03-17 PROCEDURE — 78452 HT MUSCLE IMAGE SPECT MULT: CPT

## 2023-03-17 RX ADMIN — TETROFOSMIN 1 DOSE: 1.38 INJECTION, POWDER, LYOPHILIZED, FOR SOLUTION INTRAVENOUS at 12:57

## 2023-03-17 RX ADMIN — TETROFOSMIN 1 DOSE: 1.38 INJECTION, POWDER, LYOPHILIZED, FOR SOLUTION INTRAVENOUS at 11:11

## 2023-03-17 RX ADMIN — REGADENOSON 0.4 MG: 0.08 INJECTION, SOLUTION INTRAVENOUS at 12:57

## 2023-03-17 NOTE — TELEPHONE ENCOUNTER
----- Message from Hema English MD sent at 3/17/2023  3:04 PM EDT -----  Echocardiogram : Heart function is normal, there are no major valvular problems    Stress testing : The imaging showed normal blood supply to all the walls of the heart, indicating that there are no major blockages.  EKG part of the test showed some changes.    Please schedule a follow-up appointment to discuss the stress test results in detail and formulate management plans.  2 to 3-week follow-up, okay to double book if needed      Electronically signed by Hema English MD, 03/17/23, 3:04 PM EDT.

## 2023-03-17 NOTE — PROGRESS NOTES
Echocardiogram : Heart function is normal, there are no major valvular problems    Stress testing : The imaging showed normal blood supply to all the walls of the heart, indicating that there are no major blockages.  EKG part of the test showed some changes.    Please schedule a follow-up appointment to discuss the stress test results in detail and formulate management plans.  2 to 3-week follow-up, okay to double book if needed      Electronically signed by Hema English MD, 03/17/23, 3:04 PM EDT.

## 2023-03-17 NOTE — TELEPHONE ENCOUNTER
NATASHA patient. Patient verbalized understanding and appreciation. Patient is agreeable to f/u with Dr English on 4/3/23 at 1145.

## 2023-04-03 ENCOUNTER — OFFICE VISIT (OUTPATIENT)
Dept: CARDIOLOGY | Facility: CLINIC | Age: 68
End: 2023-04-03
Payer: MEDICARE

## 2023-04-03 VITALS
WEIGHT: 166 LBS | BODY MASS INDEX: 33.47 KG/M2 | HEART RATE: 64 BPM | HEIGHT: 59 IN | SYSTOLIC BLOOD PRESSURE: 126 MMHG | DIASTOLIC BLOOD PRESSURE: 79 MMHG

## 2023-04-03 DIAGNOSIS — E78.5 HYPERLIPIDEMIA, UNSPECIFIED HYPERLIPIDEMIA TYPE: Chronic | ICD-10-CM

## 2023-04-03 DIAGNOSIS — I25.10 CORONARY ARTERY CALCIFICATION: ICD-10-CM

## 2023-04-03 DIAGNOSIS — I25.84 CORONARY ARTERY CALCIFICATION: ICD-10-CM

## 2023-04-03 PROCEDURE — 1159F MED LIST DOCD IN RCRD: CPT | Performed by: INTERNAL MEDICINE

## 2023-04-03 PROCEDURE — 1160F RVW MEDS BY RX/DR IN RCRD: CPT | Performed by: INTERNAL MEDICINE

## 2023-04-03 PROCEDURE — 99213 OFFICE O/P EST LOW 20 MIN: CPT | Performed by: INTERNAL MEDICINE

## 2023-04-03 RX ORDER — SIMVASTATIN 20 MG
20 TABLET ORAL NIGHTLY
Qty: 90 TABLET | Refills: 0 | Status: SHIPPED | OUTPATIENT
Start: 2023-04-03

## 2023-04-03 RX ORDER — ASPIRIN 81 MG/1
81 TABLET ORAL DAILY
Qty: 90 TABLET | Refills: 3 | Status: SHIPPED | OUTPATIENT
Start: 2023-04-03

## 2023-04-15 NOTE — PROGRESS NOTES
CARDIOLOGY FOLLOW-UP PROGRESS NOTE        Chief Complaint  Follow-up and Hyperlipidemia    Subjective            Veda Peña presents to [facility] CARDIOLOGY  History of Present Illness      Mr. Peña is here for follow-up visit.  She was previously seen in February for evaluation of coronary artery calcification, incidentally detected on a CT scan of the chest.  Since then patient underwent echocardiogram and SPECT stress testing.    Today patient denies any new complaints.  She feels rare cramps in the chest which are not related to exertion.  Denies any shortness of breath or palpitations.      Past History:       Chronic obstructive pulmonary disease  Hiatal hernia  Mixed hyperlipidemia    Medical History:  Past Medical History:   Diagnosis Date   • Anemia 2021   • Anxiety 2015   • Arthritis    • Cancer 2021-present    Skin   • Cataract 2020    Cataract surgery in both eyes   • Cholelithiasis     Surgery   • CKD (chronic kidney disease)    • Colon polyp     Colonoscopy, removed and biopsied   • COPD (chronic obstructive pulmonary disease)    • Depression    • Elevated glucose 04/14/2021   • Fibromyalgia, primary     ?   • GERD (gastroesophageal reflux disease)    • History of 2019 novel coronavirus disease (COVID-19) 04/14/2021   • HL (hearing loss) 2021    Hearing aids   • Hyperlipidemia    • Hypothyroidism    • Irritable bowel syndrome     Constipation..ongoing   • Low back pain 2022    Lower back, across hips, and down both legs   • Neuropathic pain    • Neuropathy    • Obesity    • Osteopenia    • Renal insufficiency        Surgical History: has a past surgical history that includes Anterior cruciate ligament repair; Hysterectomy; Tubal ligation; Gallbladder surgery; Eye surgery; and Cholecystectomy.     Family History: family history includes Cancer in her father, maternal grandmother, mother, and sister; Thyroid disease in her daughter; Vision loss in her daughter.     Social  "History: reports that she quit smoking about 10 years ago. Her smoking use included cigarettes. She has a 80.00 pack-year smoking history. She has never used smokeless tobacco. She reports that she does not drink alcohol and does not use drugs.    Allergies: Tramadol, Prednisone, Codeine, Diazepam, and Venlafaxine    Current Outpatient Medications on File Prior to Visit   Medication Sig   • albuterol sulfate  (90 Base) MCG/ACT inhaler Inhale 2 puffs Every 6 (Six) Hours As Needed for Wheezing.   • Cholecalciferol (Vitamin D3) 75 MCG (3000 UT) tablet Take 1 tablet by mouth Every Other Day.   • gabapentin (NEURONTIN) 300 MG capsule Take 1 capsule by mouth 2 (two) times a day.   • HYDROcodone-acetaminophen (NORCO)  MG per tablet hydrocodone-acetaminophen  mg oral tablet take 2.5 tablets by oral route daily   Suspended   • levothyroxine sodium (TIROSINT) 50 MCG capsule Take 1 capsule by mouth Daily.   • multivitamin with minerals tablet tablet Take 1 tablet by mouth Daily.   • omeprazole (priLOSEC) 20 MG capsule Take 1 capsule by mouth Daily.   • oxybutynin XL (DITROPAN-XL) 10 MG 24 hr tablet Take 2 tablets by mouth Daily.   • Senna-Natural Laxatives (SENOKOT LAXATIVE PO) Take 1 tablet by mouth Every Night.     No current facility-administered medications on file prior to visit.          Review of Systems   Respiratory: Negative for cough, shortness of breath and wheezing.    Cardiovascular: Negative for chest pain, palpitations and leg swelling.   Gastrointestinal: Negative for nausea and vomiting.   Neurological: Negative for dizziness and syncope.        Objective     /79   Pulse 64   Ht 149.9 cm (59\")   Wt 75.3 kg (166 lb)   BMI 33.53 kg/m²       Physical Exam    General : Alert, awake, no acute distress  Neck : Supple, no carotid bruit, no jugular venous distention  CVS : Regular rate and rhythm, no murmur, rubs or gallops  Lungs: Clear to auscultation bilaterally, no crackles or " rhonchi  Abdomen: Soft, nontender, bowel sounds heard in all 4 quadrants  Extremities: Warm, well-perfused, no pedal edema    Result Review :     The following data was reviewed by: Hema English MD on 04/03/2023:    CMP        4/28/2022    11:45 11/30/2022    13:31   CMP   Glucose 97   99     BUN 16   12     Creatinine 0.99   0.83     EGFR 63.0   77.4     Sodium 141   138     Potassium 4.7   4.6     Chloride 102   99     Calcium 10.0   10.5     Total Protein 7.5   7.5     Albumin 4.70   4.80     Globulin 2.8   2.7     Total Bilirubin 0.4   0.4     Alkaline Phosphatase 76   89     AST (SGOT) 22   24     ALT (SGPT) 19   18     Albumin/Globulin Ratio 1.7   1.8     BUN/Creatinine Ratio 16.2   14.5     Anion Gap 14.5   10.0       CBC        4/28/2022    11:45 11/30/2022    13:31   CBC   WBC 7.10   9.21     RBC 4.96   5.27     Hemoglobin 12.6   12.5     Hematocrit 39.1   41.1     MCV 78.8   78.0     MCH 25.4   23.7     MCHC 32.2   30.4     RDW 14.4   15.3     Platelets 260   329       TSH        6/27/2022    08:56 9/27/2022    11:43 11/30/2022    13:31   TSH   TSH 0.222   0.070   1.490       Lipid Panel        4/28/2022    11:45 11/30/2022    13:31   Lipid Panel   Total Cholesterol 171   158     Triglycerides 129   93     HDL Cholesterol 52   51     VLDL Cholesterol 23   17     LDL Cholesterol  96   90     LDL/HDL Ratio 1.79   1.73            Data reviewed: Cardiology studies        Results for orders placed during the hospital encounter of 03/17/23    Adult Transthoracic Echo Complete W/ Cont if Necessary Per Protocol    Interpretation Summary  •  Left ventricular systolic function is normal. Left ventricular ejection fraction appears to be 56 - 60%.  •  Left ventricular diastolic function is consistent with (grade I) impaired relaxation.  •  There are no significant valvular abnormalities.  •  Estimated right ventricular systolic pressure from tricuspid regurgitation is normal (<35 mmHg).      Results for orders  placed during the hospital encounter of 03/17/23    Stress Test With Myocardial Perfusion One Day    Interpretation Summary  •  Myocardial perfusion imaging indicates a normal myocardial perfusion study with no evidence of ischemia.  •  Left ventricular ejection fraction is hyperdynamic (Calculated EF > 70%).  •  There was no chest pain with regadenoson infusion.  EKG on record at infusion showed 1 mm horizontal ST segment depressions, suggestive of ischemia.  •  Impressions are consistent with a low risk study.  •  Conclusion : Stress EKG showed ischemic changes, but SPECT stress test did not show any perfusion defects.  Clinical correlation recommended.               Assessment and Plan        Diagnoses and all orders for this visit:    1. Coronary artery calcification  Assessment & Plan:  Recent echocardiogram showed normal LV function.  SPECT stress test showed no perfusion defect to suggest ischemia.  However EKG during stress showed some ischemic changes.  Findings were discussed in detail with the patient.  She has no symptoms suggestive of angina at this time.  We will continue with conservative management for now.  Recommend to start taking aspirin 81 mg p.o. daily.  Continue statins.  She is encouraged to call us back for any chest discomfort which comes with activity.    Orders:  -     aspirin 81 MG EC tablet; Take 1 tablet by mouth Daily.  Dispense: 90 tablet; Refill: 3            Follow Up     Return in about 6 months (around 10/3/2023) for Next scheduled follow up, OK to use a new patient slot if needed. .    Patient was given instructions and counseling regarding her condition or for health maintenance advice. Please see specific information pulled into the AVS if appropriate.

## 2023-04-15 NOTE — ASSESSMENT & PLAN NOTE
Recent echocardiogram showed normal LV function.  SPECT stress test showed no perfusion defect to suggest ischemia.  However EKG during stress showed some ischemic changes.  Findings were discussed in detail with the patient.  She has no symptoms suggestive of angina at this time.  We will continue with conservative management for now.  Recommend to start taking aspirin 81 mg p.o. daily.  Continue statins.  She is encouraged to call us back for any chest discomfort which comes with activity.

## 2023-05-23 NOTE — PROGRESS NOTES
The ABCs of the Annual Wellness Visit  Subsequent Medicare Wellness Visit    Subjective    Veda Peña is a 67 y.o. female who presents for a Subsequent Medicare Wellness Visit.    The following portions of the patient's history were reviewed and   updated as appropriate: allergies, current medications, past family history, past medical history, past social history, past surgical history and problem list.    Compared to one year ago, the patient feels her physical   health is worse.    Compared to one year ago, the patient feels her mental   health is the same.    Recent Hospitalizations:  She was not admitted to the hospital during the last year.       Current Medical Providers:  Patient Care Team:  Lina Nguyen PA as PCP - General (Family Medicine)    Outpatient Medications Prior to Visit   Medication Sig Dispense Refill   • aspirin 81 MG EC tablet Take 1 tablet by mouth Daily. 90 tablet 3   • Cholecalciferol (Vitamin D3) 75 MCG (3000 UT) tablet Take 1 tablet by mouth Every Other Day.     • gabapentin (NEURONTIN) 300 MG capsule Take 1 capsule by mouth 2 (two) times a day.     • HYDROcodone-acetaminophen (NORCO)  MG per tablet hydrocodone-acetaminophen  mg oral tablet take 2.5 tablets by oral route daily   Suspended     • multivitamin with minerals tablet tablet Take 1 tablet by mouth Daily.     • Senna-Natural Laxatives (SENOKOT LAXATIVE PO) Take 1 tablet by mouth Every Night.     • albuterol sulfate  (90 Base) MCG/ACT inhaler Inhale 2 puffs Every 6 (Six) Hours As Needed for Wheezing. 18 g 1   • levothyroxine sodium (TIROSINT) 50 MCG capsule Take 1 capsule by mouth Daily. 90 capsule 1   • omeprazole (priLOSEC) 20 MG capsule Take 1 capsule by mouth Daily. 90 capsule 1   • oxybutynin XL (DITROPAN-XL) 10 MG 24 hr tablet Take 2 tablets by mouth Daily. 180 tablet 1   • simvastatin (ZOCOR) 20 MG tablet Take 1 tablet by mouth Every Night. 90 tablet 0     No facility-administered  "medications prior to visit.       Opioid medication/s are on active medication list.  and I have evaluated her active treatment plan and pain score trends (see table).  There were no vitals filed for this visit.  I have reviewed the chart for potential of high risk medication and harmful drug interactions in the elderly.            Aspirin is on active medication list. Aspirin use is indicated based on review of current medical condition/s. Pros and cons of this therapy have been discussed today. Benefits of this medication outweigh potential harm.  Patient has been encouraged to continue taking this medication.  .      Patient Active Problem List   Diagnosis   • Chronic kidney disease   • COPD (chronic obstructive pulmonary disease)   • Elevated glucose   • GERD (gastroesophageal reflux disease)   • Mixed hyperlipidemia   • Hypothyroidism   • Neuropathy   • DDD (degenerative disc disease), cervical   • Primary localized osteoarthrosis of right lower leg   • Right knee pain   • Fibromyalgia   • Low back pain   • Osteoarthritis of right knee   • Class 1 obesity due to excess calories with body mass index (BMI) of 32.0 to 32.9 in adult   • Coronary artery calcification     Advance Care Planning   Advance Care Planning     Advance Directive is not on file.  ACP discussion was held with the patient during this visit. Patient has an advance directive (not in EMR), copy requested.     Objective    Vitals:    05/24/23 1037   BP: 125/64   BP Location: Right arm   Patient Position: Sitting   Cuff Size: Adult   Pulse: 55   Resp: 20   SpO2: 95%   Weight: 73.3 kg (161 lb 9.6 oz)   Height: 149.9 cm (59\")     Estimated body mass index is 32.64 kg/m² as calculated from the following:    Height as of this encounter: 149.9 cm (59\").    Weight as of this encounter: 73.3 kg (161 lb 9.6 oz).    BMI is >= 30 and <35. (Class 1 Obesity). The following options were offered after discussion;: weight loss educational material (shared in " after visit summary)      Does the patient have evidence of cognitive impairment? No    Lab Results   Component Value Date    TRIG 83 2023    HDL 50 2023    LDL 80 2023    VLDL 16 2023    HGBA1C 6.30 (H) 2023        HEALTH RISK ASSESSMENT    Smoking Status:  Social History     Tobacco Use   Smoking Status Former   • Packs/day: 2.00   • Years: 40.00   • Pack years: 80.00   • Types: Cigarettes   • Quit date: 3/26/2013   • Years since quitting: 10.1   Smokeless Tobacco Never     Alcohol Consumption:  Social History     Substance and Sexual Activity   Alcohol Use Never     Fall Risk Screen:    RAYMUNDO Fall Risk Assessment was completed, and patient is at LOW risk for falls.Assessment completed on:2023    Depression Screenin/24/2023    10:33 AM   PHQ-2/PHQ-9 Depression Screening   Little Interest or Pleasure in Doing Things 0-->not at all   Feeling Down, Depressed or Hopeless 0-->not at all   PHQ-9: Brief Depression Severity Measure Score 0       Health Habits and Functional and Cognitive Screenin/24/2023    10:00 AM   Functional & Cognitive Status   Do you have difficulty preparing food and eating? No   Do you have difficulty bathing yourself, getting dressed or grooming yourself? No   Do you have difficulty using the toilet? No   Do you have difficulty moving around from place to place? No   Do you have trouble with steps or getting out of a bed or a chair? No   Current Diet Well Balanced Diet   Dental Exam Up to date   Eye Exam Up to date   Exercise (times per week) 7 times per week   Current Exercises Include Walking;Yard Work;House Cleaning   Do you need help using the phone?  No   Are you deaf or do you have serious difficulty hearing?  No   Do you need help with transportation? No   Do you need help shopping? No   Do you need help preparing meals?  No   Do you need help with housework?  No   Do you need help with laundry? No   Do you need help taking your  medications? No   Do you need help managing money? No   Do you ever drive or ride in a car without wearing a seat belt? No   Have you felt unusual stress, anger or loneliness in the last month? No   Who do you live with? Alone   If you need help, do you have trouble finding someone available to you? No   Have you been bothered in the last four weeks by sexual problems? No   Do you have difficulty concentrating, remembering or making decisions? No       Age-appropriate Screening Schedule:  Refer to the list below for future screening recommendations based on patient's age, sex and/or medical conditions. Orders for these recommended tests are listed in the plan section. The patient has been provided with a written plan.    Health Maintenance   Topic Date Due   • COVID-19 Vaccine (1) 05/26/2023 (Originally 1955)   • Pneumococcal Vaccine 65+ (1 - PCV) 11/30/2023 (Originally 5/28/1961)   • ZOSTER VACCINE (1 of 2) 11/30/2023 (Originally 5/28/2005)   • INFLUENZA VACCINE  08/01/2023   • DXA SCAN  11/29/2023   • LUNG CANCER SCREENING  12/28/2023   • ANNUAL WELLNESS VISIT  05/24/2024   • LIPID PANEL  05/24/2024   • MAMMOGRAM  02/27/2025   • COLORECTAL CANCER SCREENING  09/23/2025   • TDAP/TD VACCINES (2 - Td or Tdap) 01/01/2030   • HEPATITIS C SCREENING  Completed                  CMS Preventative Services Quick Reference  Risk Factors Identified During Encounter  None Identified  Immunizations Discussed/Encouraged: Prevnar 20 (Pneumococcal 20-valent conjugate) and COVID-19  The above risks/problems have been discussed with the patient.  Pertinent information has been shared with the patient in the After Visit Summary.  An After Visit Summary and PPPS were made available to the patient.    Follow Up:   Next Medicare Wellness visit to be scheduled in 1 year.       Additional E&M Note during same encounter follows:  Patient has multiple medical problems which are significant and separately identifiable that require  "additional work above and beyond the Medicare Wellness Visit.      Chief Complaint  Follow-up (6 month), Hypothyroidism, OAB, Heartburn, and Hyperlipidemia    Subjective        HPI  Veda Peña is also being seen today for 6 month follow up.    Hypothyroidism: Patient is currently on Tirosint and doing well. Patient states energy is good. Patient denies weight changes, bowel changes and changes in hair, skin and nails.    OAB: Patient is currently on Oxybutynin for overactive bladder. Patient states the medication is working well. Patient denies urinary frequency and leakage. Patient denies any adverse effects from medication.    GERD: Patient is currently on Omeprazole for the treatment of GERD. Patient is doing well without breakthrough symptoms.     HL: Patient presents for management of hyperlipidemia. Patient is currently on Simvastatin. Patient denies muscle cramps and muscle weakness. Patient is monitoring diet to help lower lipids.    COPD with emphysema: Pt is currently using Albuterol inhaler as needed but complains of increased shortness of air and cough. LDCT 12/22; no suspicious mass.    Patient c/o nausea, vomiting and diarrhea for the past 3 days but better today; denies fever, urinary symptoms.    Patient c/o intermittent LBP that radiates into bilateral legs/thigh area; worse with standing/ambulation. TENs unit with some temporary relief of symptoms. Wears back brace with some relief of symptoms; denies trauma. Symptoms over 6mths.    Occasional trouble swallowing.    Declines Pneumonia vaccine.    Colon: 9.22.22  Mammo: 2.27.23  DXA: 11.29.21     BMI is >= 30 and <35. (Class 1 Obesity). The following options were offered after discussion;: weight loss educational material (shared in after visit summary)      Objective   Vital Signs:  /64 (BP Location: Right arm, Patient Position: Sitting, Cuff Size: Adult)   Pulse 55   Resp 20   Ht 149.9 cm (59\")   Wt 73.3 kg (161 lb 9.6 oz)   SpO2 95% "   BMI 32.64 kg/m²     Physical Exam  Vitals and nursing note reviewed.   Constitutional:       Appearance: Normal appearance. She is obese.   HENT:      Head: Normocephalic and atraumatic.   Neck:      Vascular: No carotid bruit.      Comments: Thyroid : enlarged on left; non-tender  Cardiovascular:      Rate and Rhythm: Normal rate and regular rhythm.      Pulses: Normal pulses.      Heart sounds: Normal heart sounds.   Pulmonary:      Effort: Pulmonary effort is normal.      Breath sounds: Normal breath sounds.   Musculoskeletal:      Cervical back: Neck supple. No tenderness.      Lumbar back: Tenderness present. No spasms. Negative right straight leg raise test and negative left straight leg raise test.      Right lower leg: No edema.      Left lower leg: No edema.   Lymphadenopathy:      Cervical: No cervical adenopathy.   Neurological:      Mental Status: She is alert.   Psychiatric:         Mood and Affect: Mood normal.         Behavior: Behavior normal.                         Assessment and Plan   Diagnoses and all orders for this visit:    1. Medicare annual wellness visit, subsequent (Primary)    2. Pulmonary emphysema, unspecified emphysema type  Comments:  Not controlled: continue with Albuterol inhaler as needed; add Symbicort 80/4.5 2 puffs twice daily; admin and side effects discussed; f/u if no improvement.  Orders:  -     budesonide-formoterol (Symbicort) 80-4.5 MCG/ACT inhaler; Inhale 2 puffs 2 (Two) Times a Day.  Dispense: 10.2 g; Refill: 2  -     albuterol sulfate  (90 Base) MCG/ACT inhaler; Inhale 2 puffs Every 6 (Six) Hours As Needed for Wheezing.  Dispense: 18 g; Refill: 1    3. Low back pain with radiation  Comments:  Chronic problem: check xray of L-spine; consider PT and/or MRI to further evaluate. Continue with pain mgt, tens and back brace for now.  Orders:  -     XR Spine Lumbar 4+ View; Future    4. Hyperlipidemia, unspecified hyperlipidemia type  Comments:  Stable on Zocor  20mg daily; check labs and follow up in 6mths.  Orders:  -     simvastatin (ZOCOR) 20 MG tablet; Take 1 tablet by mouth Every Night.  Dispense: 90 tablet; Refill: 1  -     Comprehensive Metabolic Panel; Future  -     Lipid Panel; Future    5. OAB (overactive bladder)  Comments:  Stable on Oxybutynin XL 10mg daily; check ua and follow up in 6mths.  Orders:  -     oxybutynin XL (DITROPAN-XL) 10 MG 24 hr tablet; Take 2 tablets by mouth Daily.  Dispense: 180 tablet; Refill: 1    6. Gastroesophageal reflux disease, unspecified whether esophagitis present  Comments:  Stable on Omeprazole 20mg daily; check labs and follow up in 6mths.  Orders:  -     omeprazole (priLOSEC) 20 MG capsule; Take 1 capsule by mouth Daily.  Dispense: 90 capsule; Refill: 1  -     CBC & Differential; Future    7. Hypothyroidism, unspecified type  Comments:  Currently stable: will continue with Levothyroxine 50mcg daily; check labs and follow up in 6mths  Orders:  -     levothyroxine sodium (TIROSINT) 50 MCG capsule; Take 1 capsule by mouth Daily.  Dispense: 90 capsule; Refill: 1  -     TSH; Future  -     T4, Free; Future    8. Elevated glucose  Comments:  Check labs for stabililty.    Orders:  -     Hemoglobin A1c; Future    9. Thyromegaly  Comments:  New finding on exam: check thyroid u/s.  Orders:  -     US Thyroid; Future             Follow Up   Return in about 6 months (around 11/24/2023).  Patient was given instructions and counseling regarding her condition or for health maintenance advice. Please see specific information pulled into the AVS if appropriate.

## 2023-05-24 ENCOUNTER — OFFICE VISIT (OUTPATIENT)
Dept: FAMILY MEDICINE CLINIC | Facility: CLINIC | Age: 68
End: 2023-05-24
Payer: MEDICARE

## 2023-05-24 ENCOUNTER — LAB (OUTPATIENT)
Dept: LAB | Facility: HOSPITAL | Age: 68
End: 2023-05-24
Payer: MEDICARE

## 2023-05-24 ENCOUNTER — HOSPITAL ENCOUNTER (OUTPATIENT)
Dept: GENERAL RADIOLOGY | Facility: HOSPITAL | Age: 68
Discharge: HOME | End: 2023-05-24
Payer: MEDICARE

## 2023-05-24 VITALS
HEART RATE: 55 BPM | BODY MASS INDEX: 32.58 KG/M2 | WEIGHT: 161.6 LBS | OXYGEN SATURATION: 95 % | RESPIRATION RATE: 20 BRPM | HEIGHT: 59 IN | SYSTOLIC BLOOD PRESSURE: 125 MMHG | DIASTOLIC BLOOD PRESSURE: 64 MMHG

## 2023-05-24 DIAGNOSIS — N32.81 OAB (OVERACTIVE BLADDER): Chronic | ICD-10-CM

## 2023-05-24 DIAGNOSIS — M54.50 LOW BACK PAIN WITH RADIATION: Chronic | ICD-10-CM

## 2023-05-24 DIAGNOSIS — Z00.00 MEDICARE ANNUAL WELLNESS VISIT, SUBSEQUENT: Primary | ICD-10-CM

## 2023-05-24 DIAGNOSIS — E03.9 HYPOTHYROIDISM, UNSPECIFIED TYPE: Chronic | ICD-10-CM

## 2023-05-24 DIAGNOSIS — R73.09 ELEVATED GLUCOSE: ICD-10-CM

## 2023-05-24 DIAGNOSIS — E66.09 CLASS 1 OBESITY DUE TO EXCESS CALORIES WITH SERIOUS COMORBIDITY AND BODY MASS INDEX (BMI) OF 32.0 TO 32.9 IN ADULT: ICD-10-CM

## 2023-05-24 DIAGNOSIS — M54.50 LOW BACK PAIN WITH RADIATION: ICD-10-CM

## 2023-05-24 DIAGNOSIS — E78.5 HYPERLIPIDEMIA, UNSPECIFIED HYPERLIPIDEMIA TYPE: Chronic | ICD-10-CM

## 2023-05-24 DIAGNOSIS — K21.9 GASTROESOPHAGEAL REFLUX DISEASE, UNSPECIFIED WHETHER ESOPHAGITIS PRESENT: ICD-10-CM

## 2023-05-24 DIAGNOSIS — K21.9 GASTROESOPHAGEAL REFLUX DISEASE, UNSPECIFIED WHETHER ESOPHAGITIS PRESENT: Chronic | ICD-10-CM

## 2023-05-24 DIAGNOSIS — J43.9 PULMONARY EMPHYSEMA, UNSPECIFIED EMPHYSEMA TYPE: Chronic | ICD-10-CM

## 2023-05-24 DIAGNOSIS — E01.0 THYROMEGALY: ICD-10-CM

## 2023-05-24 LAB
ALBUMIN SERPL-MCNC: 4.4 G/DL (ref 3.5–5.2)
ALBUMIN/GLOB SERPL: 1.5 G/DL
ALP SERPL-CCNC: 72 U/L (ref 39–117)
ALT SERPL W P-5'-P-CCNC: 22 U/L (ref 1–33)
ANION GAP SERPL CALCULATED.3IONS-SCNC: 8.8 MMOL/L (ref 5–15)
AST SERPL-CCNC: 23 U/L (ref 1–32)
BASOPHILS # BLD AUTO: 0.06 10*3/MM3 (ref 0–0.2)
BASOPHILS NFR BLD AUTO: 0.8 % (ref 0–1.5)
BILIRUB SERPL-MCNC: 0.5 MG/DL (ref 0–1.2)
BUN SERPL-MCNC: 19 MG/DL (ref 8–23)
BUN/CREAT SERPL: 17 (ref 7–25)
CALCIUM SPEC-SCNC: 10.2 MG/DL (ref 8.6–10.5)
CHLORIDE SERPL-SCNC: 104 MMOL/L (ref 98–107)
CHOLEST SERPL-MCNC: 146 MG/DL (ref 0–200)
CO2 SERPL-SCNC: 25.2 MMOL/L (ref 22–29)
CREAT SERPL-MCNC: 1.12 MG/DL (ref 0.57–1)
DEPRECATED RDW RBC AUTO: 40.7 FL (ref 37–54)
EGFRCR SERPLBLD CKD-EPI 2021: 54 ML/MIN/1.73
EOSINOPHIL # BLD AUTO: 0.12 10*3/MM3 (ref 0–0.4)
EOSINOPHIL NFR BLD AUTO: 1.5 % (ref 0.3–6.2)
ERYTHROCYTE [DISTWIDTH] IN BLOOD BY AUTOMATED COUNT: 14.3 % (ref 12.3–15.4)
GLOBULIN UR ELPH-MCNC: 2.9 GM/DL
GLUCOSE SERPL-MCNC: 104 MG/DL (ref 65–99)
HBA1C MFR BLD: 6.3 % (ref 4.8–5.6)
HCT VFR BLD AUTO: 38.9 % (ref 34–46.6)
HDLC SERPL-MCNC: 50 MG/DL (ref 40–60)
HGB BLD-MCNC: 12.1 G/DL (ref 12–15.9)
IMM GRANULOCYTES # BLD AUTO: 0.05 10*3/MM3 (ref 0–0.05)
IMM GRANULOCYTES NFR BLD AUTO: 0.6 % (ref 0–0.5)
LDLC SERPL CALC-MCNC: 80 MG/DL (ref 0–100)
LDLC/HDLC SERPL: 1.59 {RATIO}
LYMPHOCYTES # BLD AUTO: 2.82 10*3/MM3 (ref 0.7–3.1)
LYMPHOCYTES NFR BLD AUTO: 35.3 % (ref 19.6–45.3)
MCH RBC QN AUTO: 24.7 PG (ref 26.6–33)
MCHC RBC AUTO-ENTMCNC: 31.1 G/DL (ref 31.5–35.7)
MCV RBC AUTO: 79.4 FL (ref 79–97)
MONOCYTES # BLD AUTO: 0.55 10*3/MM3 (ref 0.1–0.9)
MONOCYTES NFR BLD AUTO: 6.9 % (ref 5–12)
NEUTROPHILS NFR BLD AUTO: 4.4 10*3/MM3 (ref 1.7–7)
NEUTROPHILS NFR BLD AUTO: 54.9 % (ref 42.7–76)
NRBC BLD AUTO-RTO: 0 /100 WBC (ref 0–0.2)
PLATELET # BLD AUTO: 283 10*3/MM3 (ref 140–450)
PMV BLD AUTO: 11.4 FL (ref 6–12)
POTASSIUM SERPL-SCNC: 4.7 MMOL/L (ref 3.5–5.2)
PROT SERPL-MCNC: 7.3 G/DL (ref 6–8.5)
RBC # BLD AUTO: 4.9 10*6/MM3 (ref 3.77–5.28)
SODIUM SERPL-SCNC: 138 MMOL/L (ref 136–145)
T4 FREE SERPL-MCNC: 1.31 NG/DL (ref 0.93–1.7)
TRIGL SERPL-MCNC: 83 MG/DL (ref 0–150)
TSH SERPL DL<=0.05 MIU/L-ACNC: 3.13 UIU/ML (ref 0.27–4.2)
VLDLC SERPL-MCNC: 16 MG/DL (ref 5–40)
WBC NRBC COR # BLD: 8 10*3/MM3 (ref 3.4–10.8)

## 2023-05-24 PROCEDURE — 36415 COLL VENOUS BLD VENIPUNCTURE: CPT

## 2023-05-24 PROCEDURE — 85025 COMPLETE CBC W/AUTO DIFF WBC: CPT

## 2023-05-24 PROCEDURE — 84439 ASSAY OF FREE THYROXINE: CPT

## 2023-05-24 PROCEDURE — 84443 ASSAY THYROID STIM HORMONE: CPT

## 2023-05-24 PROCEDURE — 83036 HEMOGLOBIN GLYCOSYLATED A1C: CPT

## 2023-05-24 PROCEDURE — 80053 COMPREHEN METABOLIC PANEL: CPT

## 2023-05-24 PROCEDURE — 80061 LIPID PANEL: CPT

## 2023-05-24 PROCEDURE — 72110 X-RAY EXAM L-2 SPINE 4/>VWS: CPT

## 2023-05-24 RX ORDER — BUDESONIDE AND FORMOTEROL FUMARATE DIHYDRATE 80; 4.5 UG/1; UG/1
2 AEROSOL RESPIRATORY (INHALATION)
Qty: 10.2 G | Refills: 2 | Status: SHIPPED | OUTPATIENT
Start: 2023-05-24

## 2023-05-24 RX ORDER — OMEPRAZOLE 20 MG/1
20 CAPSULE, DELAYED RELEASE ORAL DAILY
Qty: 90 CAPSULE | Refills: 1 | Status: SHIPPED | OUTPATIENT
Start: 2023-05-24

## 2023-05-24 RX ORDER — ALBUTEROL SULFATE 90 UG/1
2 AEROSOL, METERED RESPIRATORY (INHALATION) EVERY 6 HOURS PRN
Qty: 18 G | Refills: 1 | Status: SHIPPED | OUTPATIENT
Start: 2023-05-24

## 2023-05-24 RX ORDER — OXYBUTYNIN CHLORIDE 10 MG/1
20 TABLET, EXTENDED RELEASE ORAL DAILY
Qty: 180 TABLET | Refills: 1 | Status: SHIPPED | OUTPATIENT
Start: 2023-05-24

## 2023-05-24 RX ORDER — SIMVASTATIN 20 MG
20 TABLET ORAL NIGHTLY
Qty: 90 TABLET | Refills: 1 | Status: SHIPPED | OUTPATIENT
Start: 2023-05-24

## 2023-05-24 RX ORDER — LEVOTHYROXINE SODIUM 50 UG/1
50 CAPSULE ORAL DAILY
Qty: 90 CAPSULE | Refills: 1 | Status: SHIPPED | OUTPATIENT
Start: 2023-05-24

## 2023-06-06 ENCOUNTER — TELEPHONE (OUTPATIENT)
Dept: FAMILY MEDICINE CLINIC | Facility: CLINIC | Age: 68
End: 2023-06-06
Payer: MEDICARE

## 2023-06-06 NOTE — TELEPHONE ENCOUNTER
Patient is at the lab and is wondering why her orders aren't put in. Lab is requesting a phone call - [phone]

## 2023-06-06 NOTE — TELEPHONE ENCOUNTER
Caller: Christine Peña    Relationship: Self    Best call back number: [phone]    What is the best time to reach you: ANYTIME    Who are you requesting to speak with (clinical staff, provider,  specific staff member): NIKO    Do you know the name of the person who called: CHRISTINE    What was the call regarding: Patient went to lab to have them done and they was not put in the systems     Is it okay if the provider responds through MyChart: no

## 2023-06-07 NOTE — TELEPHONE ENCOUNTER
Front office spoke with lab, advised lab that there were no outstanding labs but there is an outstanding order for an MRI.

## 2023-06-08 NOTE — TELEPHONE ENCOUNTER
Spoke with patient and explained the situation and apologized for her wasted travel time to our lab. Patient was understanding.

## 2023-08-01 ENCOUNTER — OFFICE VISIT (OUTPATIENT)
Dept: NEUROSURGERY | Facility: CLINIC | Age: 68
End: 2023-08-01
Payer: MEDICARE

## 2023-08-01 ENCOUNTER — PATIENT ROUNDING (BHMG ONLY) (OUTPATIENT)
Dept: NEUROLOGY | Facility: CLINIC | Age: 68
End: 2023-08-01
Payer: MEDICARE

## 2023-08-01 VITALS
HEIGHT: 59 IN | SYSTOLIC BLOOD PRESSURE: 123 MMHG | DIASTOLIC BLOOD PRESSURE: 57 MMHG | WEIGHT: 162.1 LBS | BODY MASS INDEX: 32.68 KG/M2

## 2023-08-01 DIAGNOSIS — M43.16 ANTEROLISTHESIS OF LUMBAR SPINE: ICD-10-CM

## 2023-08-01 DIAGNOSIS — M54.42 ACUTE MIDLINE LOW BACK PAIN WITH BILATERAL SCIATICA: Primary | ICD-10-CM

## 2023-08-01 DIAGNOSIS — M54.41 ACUTE MIDLINE LOW BACK PAIN WITH BILATERAL SCIATICA: Primary | ICD-10-CM

## 2023-08-01 DIAGNOSIS — M47.817 FACET ARTHRITIS OF LUMBOSACRAL REGION: ICD-10-CM

## 2023-08-24 DIAGNOSIS — E78.5 HYPERLIPIDEMIA, UNSPECIFIED HYPERLIPIDEMIA TYPE: Chronic | ICD-10-CM

## 2023-08-24 RX ORDER — SIMVASTATIN 20 MG
20 TABLET ORAL NIGHTLY
Qty: 90 TABLET | Refills: 1 | Status: CANCELLED | OUTPATIENT
Start: 2023-08-24

## 2023-09-12 ENCOUNTER — OFFICE VISIT (OUTPATIENT)
Dept: NEUROSURGERY | Facility: CLINIC | Age: 68
End: 2023-09-12
Payer: MEDICARE

## 2023-09-12 ENCOUNTER — HOSPITAL ENCOUNTER (OUTPATIENT)
Dept: GENERAL RADIOLOGY | Facility: HOSPITAL | Age: 68
Discharge: HOME | End: 2023-09-12
Admitting: NEUROLOGICAL SURGERY
Payer: MEDICARE

## 2023-09-12 VITALS
DIASTOLIC BLOOD PRESSURE: 65 MMHG | HEIGHT: 59 IN | SYSTOLIC BLOOD PRESSURE: 124 MMHG | BODY MASS INDEX: 32.62 KG/M2 | WEIGHT: 161.8 LBS

## 2023-09-12 DIAGNOSIS — M43.16 ANTEROLISTHESIS OF LUMBAR SPINE: ICD-10-CM

## 2023-09-12 DIAGNOSIS — M54.41 ACUTE MIDLINE LOW BACK PAIN WITH BILATERAL SCIATICA: Primary | ICD-10-CM

## 2023-09-12 DIAGNOSIS — M54.42 ACUTE MIDLINE LOW BACK PAIN WITH BILATERAL SCIATICA: ICD-10-CM

## 2023-09-12 DIAGNOSIS — M54.41 ACUTE MIDLINE LOW BACK PAIN WITH BILATERAL SCIATICA: ICD-10-CM

## 2023-09-12 DIAGNOSIS — M54.42 ACUTE MIDLINE LOW BACK PAIN WITH BILATERAL SCIATICA: Primary | ICD-10-CM

## 2023-09-12 PROCEDURE — 1159F MED LIST DOCD IN RCRD: CPT | Performed by: NEUROLOGICAL SURGERY

## 2023-09-12 PROCEDURE — 72114 X-RAY EXAM L-S SPINE BENDING: CPT

## 2023-09-12 PROCEDURE — 99213 OFFICE O/P EST LOW 20 MIN: CPT | Performed by: NEUROLOGICAL SURGERY

## 2023-09-12 PROCEDURE — 1160F RVW MEDS BY RX/DR IN RCRD: CPT | Performed by: NEUROLOGICAL SURGERY

## 2023-09-12 NOTE — PROGRESS NOTES
Veda Peña is a 68 y.o. female that presents with Back Pain       She feels that therapy helped some. She has continued pain mainly across the lower back to the hips. It can go down the legs to the knee. PT helped some during the day, but not at night. The pain is the worst in the middle of the night and the morning.      Review of Systems   Musculoskeletal:  Positive for back pain.      Vitals:    09/12/23 1245   BP: 124/65        Physical Exam        Assessment and Plan {CC Problem List  Visit Diagnosis  ROS  Review (Popup)  [facility] Maintenance  Quality  BestPractice  Medications  SmartSets  SnapShot Encounters  Media :23}   Problem List Items Addressed This Visit          Musculoskeletal and Injuries    Low back pain - Primary    Relevant Orders    XR Spine Lumbar Complete With Flex & Ext     Other Visit Diagnoses       Anterolisthesis of lumbar spine        Relevant Orders    XR Spine Lumbar Complete With Flex & Ext        She will continue with her home PT.     We will obtain flex-ext xrays to assure no instability and make further plans based on these xrays.     Follow Up {Instructions Charge Capture  Follow-up Communications :23}   Return for after flex and extension xrays.

## 2023-09-18 ENCOUNTER — TELEPHONE (OUTPATIENT)
Dept: NEUROSURGERY | Facility: CLINIC | Age: 68
End: 2023-09-18
Payer: MEDICARE

## 2023-09-19 NOTE — TELEPHONE ENCOUNTER
No movement on flex-ext xrays. She could consider decompression surgery, but this would not help lower back pain, mostly leg pain.

## 2023-09-21 ENCOUNTER — OFFICE VISIT (OUTPATIENT)
Dept: NEUROSURGERY | Facility: CLINIC | Age: 68
End: 2023-09-21
Payer: MEDICARE

## 2023-09-21 VITALS — BODY MASS INDEX: 33.24 KG/M2 | WEIGHT: 164.9 LBS | HEIGHT: 59 IN

## 2023-09-21 DIAGNOSIS — M48.061 SPINAL STENOSIS OF LUMBAR REGION WITH RADICULOPATHY: Primary | ICD-10-CM

## 2023-09-21 DIAGNOSIS — M54.16 SPINAL STENOSIS OF LUMBAR REGION WITH RADICULOPATHY: Primary | ICD-10-CM

## 2023-09-21 NOTE — PROGRESS NOTES
Veda Peña is a 68 y.o. female that presents with Back Pain       She has no instability on the plain films on the flexion and extension films. She has stenosis  at L3-4. She is considering decompression.     Review of Systems   Musculoskeletal:  Positive for back pain (leg pain).      There were no vitals filed for this visit.     Physical Exam  Constitutional:       Comments: BMI 33   Cardiovascular:      Comments: No notable edema   Pulmonary:      Effort: Pulmonary effort is normal.   Neurological:      Mental Status: She is alert.   Psychiatric:         Mood and Affect: Mood normal.           Assessment and Plan {CC Problem List  Visit Diagnosis  ROS  Review (Popup)  [facility] Maintenance  Quality  BestPractice  Medications  SmartSets  SnapShot Encounters  Media :23}   Problem List Items Addressed This Visit    None  Visit Diagnoses       Spinal stenosis of lumbar region with radiculopathy    -  Primary    L3-4 with left greater than right leg pain.          She will consider left approach for L3-4 minimally invasive laminectomy. This would tend to help leg pain more than back pain. She will call back once she has made her decision.     Follow Up {Instructions Charge Capture  Follow-up Communications :23}   No follow-ups on file.

## 2023-09-26 ENCOUNTER — TELEPHONE (OUTPATIENT)
Dept: NEUROSURGERY | Facility: CLINIC | Age: 68
End: 2023-09-26

## 2023-09-26 NOTE — TELEPHONE ENCOUNTER
PATIENT CALLED IN AND STATES SHE WAS SPEAKING TO SOMEONE BUT DID NOT HAVE A NAME FOR ME.  SHE STATES SHE WANTS TO PROCEED WITH SURGERY.     PLEASE CALL PATIENT WITH ANY UPDATES ON SURGERY SCHEDULING.    THANK YOU!

## 2023-10-03 ENCOUNTER — OFFICE VISIT (OUTPATIENT)
Dept: CARDIOLOGY | Facility: CLINIC | Age: 68
End: 2023-10-03
Payer: MEDICARE

## 2023-10-03 VITALS
HEART RATE: 52 BPM | DIASTOLIC BLOOD PRESSURE: 66 MMHG | BODY MASS INDEX: 32.66 KG/M2 | SYSTOLIC BLOOD PRESSURE: 132 MMHG | HEIGHT: 59 IN | WEIGHT: 162 LBS

## 2023-10-03 DIAGNOSIS — I25.84 CORONARY ARTERY CALCIFICATION: Primary | ICD-10-CM

## 2023-10-03 DIAGNOSIS — I25.10 CORONARY ARTERY CALCIFICATION: Primary | ICD-10-CM

## 2023-10-03 DIAGNOSIS — E78.2 MIXED HYPERLIPIDEMIA: ICD-10-CM

## 2023-10-03 PROCEDURE — 1160F RVW MEDS BY RX/DR IN RCRD: CPT | Performed by: INTERNAL MEDICINE

## 2023-10-03 PROCEDURE — 99214 OFFICE O/P EST MOD 30 MIN: CPT | Performed by: INTERNAL MEDICINE

## 2023-10-03 PROCEDURE — 1159F MED LIST DOCD IN RCRD: CPT | Performed by: INTERNAL MEDICINE

## 2023-10-03 RX ORDER — SODIUM CHLORIDE 9 MG/ML
40 INJECTION, SOLUTION INTRAVENOUS AS NEEDED
OUTPATIENT
Start: 2023-10-03

## 2023-10-03 RX ORDER — SODIUM CHLORIDE 0.9 % (FLUSH) 0.9 %
10 SYRINGE (ML) INJECTION EVERY 12 HOURS SCHEDULED
OUTPATIENT
Start: 2023-10-03

## 2023-10-03 RX ORDER — SODIUM CHLORIDE 0.9 % (FLUSH) 0.9 %
10 SYRINGE (ML) INJECTION AS NEEDED
OUTPATIENT
Start: 2023-10-03

## 2023-10-03 NOTE — ASSESSMENT & PLAN NOTE
She had a previous SPECT stress test, which was positive by EKG criteria for ischemic changes.  However there were no perfusion defects.  Today she reports recurrent episodes of chest pain.  Symptoms are somewhat atypical for angina, she also has hiatal hernia.  However she has multiple risk factors of coronary artery disease.  The options were discussed with the patient.  Because of recurrent symptoms and a positive stress test, we will proceed with cardiac catheterization to delineate the coronary anatomy and angioplasty if indicated.  The risks, benefits and alternatives of the test were explained detail to the patient and she agreed to proceed.  Recommend to continue aspirin and statin.

## 2023-10-03 NOTE — PROGRESS NOTES
CARDIOLOGY FOLLOW-UP PROGRESS NOTE        Chief Complaint  Chest Pain (Follow-up)    Subjective            Veda Peña presents to [facility] CARDIOLOGY  History of Present Illness    Ms. Peña is here for routine 6-month follow-up visit.  She was previously seen and evaluated for coronary artery calcification.  A subsequent SPECT stress test showed no perfusion defects but there were EKG changes suggestive of ischemia during the test.  The findings were discussed with the patient and during last office visit, we decided to continue with medical management    Today patient reports having recurrent episodes of chest pain.  They are mostly present while lying down and more at night.  She also does report shortness of breath.  Denies palpitations or dizziness.      Past History:    Coronary artery calcification  Chronic obstructive pulmonary disease  Hiatal hernia  Mixed hyperlipidemia    Medical History:  Past Medical History:   Diagnosis Date    Anemia 2021    Anxiety 2015    Arthritis     Cancer 2021-present    Skin    Cataract 2020    Cataract surgery in both eyes    Cholelithiasis     Surgery    CKD (chronic kidney disease)     Colon polyp     Colonoscopy, removed and biopsied    COPD (chronic obstructive pulmonary disease)     Coronary artery disease     Depression     Diabetes mellitus 2023    Pre- diabetes    Elevated glucose 04/14/2021    Fibromyalgia, primary     ?    GERD (gastroesophageal reflux disease)     Headache 2020    History of 2019 novel coronavirus disease (COVID-19) 04/14/2021    HL (hearing loss) 2021    Hearing aids    Hyperlipidemia     Hypothyroidism     Irritable bowel syndrome     Constipation..ongoing    Low back pain 2022    Lower back, across hips, and down both legs    Neuropathic pain     Neuropathy     Obesity     Osteopenia     Renal insufficiency        Surgical History: has a past surgical history that includes Anterior cruciate ligament repair; Hysterectomy;  Tubal ligation; Gallbladder surgery; Eye surgery; and Cholecystectomy.     Family History: family history includes Anxiety disorder in her daughter; Arthritis in her daughter, daughter, father, maternal grandmother, and mother; COPD in her daughter, daughter, and mother; Cancer in her brother, father, maternal grandmother, mother, paternal grandmother, and sister; Diabetes in her daughter, daughter, maternal grandmother, and mother; Early death in her daughter; Heart disease in her mother; Hypertension in her father, maternal aunt, maternal grandmother, and mother; Learning disabilities in her son; Thyroid disease in her daughter and daughter; Vision loss in her daughter and daughter.     Social History: reports that she quit smoking about 10 years ago. Her smoking use included cigarettes and electronic cigarette. She has a 80.00 pack-year smoking history. She has never used smokeless tobacco. She reports that she does not drink alcohol and does not use drugs.    Allergies: Tramadol, Prednisone, Codeine, Diazepam, and Venlafaxine    Current Outpatient Medications on File Prior to Visit   Medication Sig    albuterol sulfate  (90 Base) MCG/ACT inhaler Inhale 2 puffs Every 6 (Six) Hours As Needed for Wheezing.    aspirin 81 MG EC tablet Take 1 tablet by mouth Daily.    budesonide-formoterol (Symbicort) 80-4.5 MCG/ACT inhaler Inhale 2 puffs 2 (Two) Times a Day.    Cholecalciferol (Vitamin D3) 75 MCG (3000 UT) tablet Take 1 tablet by mouth Every Other Day.    gabapentin (NEURONTIN) 300 MG capsule Take 1 capsule by mouth 2 (two) times a day.    HYDROcodone-acetaminophen (NORCO)  MG per tablet hydrocodone-acetaminophen  mg oral tablet take 2.5 tablets by oral route daily   Suspended    levothyroxine sodium (TIROSINT) 50 MCG capsule Take 1 capsule by mouth Daily.    Misc Natural Products (YUMVS BEET ROOT-TART CHERRY PO) Take  by mouth.    multivitamin with minerals tablet tablet Take 1 tablet by mouth  "Daily.    omeprazole (priLOSEC) 20 MG capsule Take 1 capsule by mouth Daily.    oxybutynin XL (DITROPAN-XL) 10 MG 24 hr tablet Take 2 tablets by mouth Daily.    Senna-Natural Laxatives (SENOKOT LAXATIVE PO) Take 1 tablet by mouth Every Night.    simvastatin (ZOCOR) 20 MG tablet Take 1 tablet by mouth Every Night.     No current facility-administered medications on file prior to visit.          Review of Systems   Respiratory:  Positive for shortness of breath. Negative for cough and wheezing.    Cardiovascular:  Positive for chest pain. Negative for palpitations and leg swelling.   Gastrointestinal:  Negative for nausea and vomiting.   Neurological:  Negative for dizziness and syncope.      Objective     /66   Pulse 52   Ht 149.9 cm (59\")   Wt 73.5 kg (162 lb)   BMI 32.72 kg/m²       Physical Exam    General : Alert, awake, no acute distress  Neck : Supple, no carotid bruit, no jugular venous distention  CVS : Regular rate and rhythm, no murmur, rubs or gallops  Lungs: Clear to auscultation bilaterally, no crackles or rhonchi  Abdomen: Soft, nontender, bowel sounds heard in all 4 quadrants  Extremities: Warm, well-perfused, no pedal edema    Result Review :     The following data was reviewed by: Hema English MD on 10/03/2023:    CMP          11/30/2022    13:31 5/24/2023    11:52   CMP   Glucose 99  104    BUN 12  19    Creatinine 0.83  1.12    EGFR 77.4  54.0    Sodium 138  138    Potassium 4.6  4.7    Chloride 99  104    Calcium 10.5  10.2    Total Protein 7.5  7.3    Albumin 4.80  4.4    Globulin 2.7  2.9    Total Bilirubin 0.4  0.5    Alkaline Phosphatase 89  72    AST (SGOT) 24  23    ALT (SGPT) 18  22    Albumin/Globulin Ratio 1.8  1.5    BUN/Creatinine Ratio 14.5  17.0    Anion Gap 10.0  8.8      CBC          11/30/2022    13:31 5/24/2023    11:52   CBC   WBC 9.21  8.00    RBC 5.27  4.90    Hemoglobin 12.5  12.1    Hematocrit 41.1  38.9    MCV 78.0  79.4    MCH 23.7  24.7    MCHC 30.4  31.1  "   RDW 15.3  14.3    Platelets 329  283      TSH          11/30/2022    13:31 5/24/2023    11:52   TSH   TSH 1.490  3.130      Lipid Panel          11/30/2022    13:31 5/24/2023    11:52   Lipid Panel   Total Cholesterol 158  146    Triglycerides 93  83    HDL Cholesterol 51  50    VLDL Cholesterol 17  16    LDL Cholesterol  90  80    LDL/HDL Ratio 1.73  1.59           Data reviewed: Cardiology studies        Results for orders placed during the hospital encounter of 03/17/23    Adult Transthoracic Echo Complete W/ Cont if Necessary Per Protocol    Interpretation Summary    Left ventricular systolic function is normal. Left ventricular ejection fraction appears to be 56 - 60%.    Left ventricular diastolic function is consistent with (grade I) impaired relaxation.    There are no significant valvular abnormalities.    Estimated right ventricular systolic pressure from tricuspid regurgitation is normal (<35 mmHg).      Results for orders placed during the hospital encounter of 03/17/23    Stress Test With Myocardial Perfusion One Day    Interpretation Summary    Myocardial perfusion imaging indicates a normal myocardial perfusion study with no evidence of ischemia.    Left ventricular ejection fraction is hyperdynamic (Calculated EF > 70%).    There was no chest pain with regadenoson infusion.  EKG on record at infusion showed 1 mm horizontal ST segment depressions, suggestive of ischemia.    Impressions are consistent with a low risk study.    Conclusion : Stress EKG showed ischemic changes, but SPECT stress test did not show any perfusion defects.  Clinical correlation recommended.               Assessment and Plan        Diagnoses and all orders for this visit:    1. Coronary artery calcification (Primary)  Assessment & Plan:  She had a previous SPECT stress test, which was positive by EKG criteria for ischemic changes.  However there were no perfusion defects.  Today she reports recurrent episodes of chest pain.   Symptoms are somewhat atypical for angina, she also has hiatal hernia.  However she has multiple risk factors of coronary artery disease.  The options were discussed with the patient.  Because of recurrent symptoms and a positive stress test, we will proceed with cardiac catheterization to delineate the coronary anatomy and angioplasty if indicated.  The risks, benefits and alternatives of the test were explained detail to the patient and she agreed to proceed.  Recommend to continue aspirin and statin.    Orders:  -     Case Request Cath Lab: Left Heart Cath with possible angioplasty; Standing  -     Obtain Informed Consent; Standing  -     Clip Bilateral Groins; Standing  -     Clip Bilateral Arms; Standing  -     Record Actual Height & Weight Prior to Procedure; Standing  -     Ajit Test Prior to Procedure; Standing  -     Obtain Informed Consent in Pre-Op; Standing  -     Notify MD; Standing  -     Notify Provider; Standing  -     CBC & Differential; Standing  -     Basic Metabolic Panel; Standing  -     Protime-INR; Standing  -     Insert Peripheral IV; Standing  -     Saline Lock & Maintain IV Access; Standing  -     sodium chloride 0.9 % flush 10 mL  -     sodium chloride 0.9 % flush 10 mL  -     sodium chloride 0.9 % infusion 40 mL  -     sodium chloride 0.9 % bolus 500 mL  -     Case Request Cath Lab: Left Heart Cath with possible angioplasty    2. Mixed hyperlipidemia  Assessment & Plan:  Most recent LDL is 80, which is near goal.  Continue simvastatin 20 mg nightly.                Follow Up     Return for Will schedule cardiac cath for 10/19/2023 .    Patient was given instructions and counseling regarding her condition or for health maintenance advice. Please see specific information pulled into the AVS if appropriate.

## 2023-10-05 ENCOUNTER — TELEPHONE (OUTPATIENT)
Dept: CARDIOLOGY | Facility: CLINIC | Age: 68
End: 2023-10-05
Payer: MEDICARE

## 2023-10-05 NOTE — TELEPHONE ENCOUNTER
Patient called to relay that she has a L heart cath with possible angioplasty on 10-19-23 and stated that she is allergic to metal. She wanted Dr. English to be aware of this in the case that any supplies contained metal. I updated her allergy list and included the metal allergy.

## 2023-10-05 NOTE — TELEPHONE ENCOUNTER
"Received a message from the patient regarding \"metal allergy\".  Is this an allergy to nickel ?  What was the allergic reaction ?     Most of the cardiacs stents contain small amount of nickel, however allergic reaction to the stent is very uncommon.  Other supplies used for diagnostic cardiac cath does not cause allergic reactions.    We will make further recommendations after knowing the details of the allergy.      Electronically signed by Hema English MD, 10/05/23, 6:24 PM EDT.        "

## 2023-10-06 NOTE — TELEPHONE ENCOUNTER
Nickel allergy is a relative contraindication for coronary stent placement.  We may still proceed with diagnostic cardiac catheterization to evaluate for blockages as planned (unlikely to cause allergic reactions).  If she needs a stent placement, it should be done at a later time after evaluation by an allergy specialist.    Other option is to continue with medical management without doing a cardiac cath at all, and consider cath for any worsening symptoms in the future.  If she prefers to go in this route, recommend a 3-month follow-up.      Electronically signed by Hema English MD, 10/06/23, 10:59 AM EDT.

## 2023-10-06 NOTE — TELEPHONE ENCOUNTER
NATASHA patient. Patient states she is allergic to Nickel. She said that and Tin are her highest reactions with metals. Patient states it causes rash, swelling, and blisters.

## 2023-10-12 ENCOUNTER — TELEPHONE (OUTPATIENT)
Dept: CARDIOLOGY | Facility: CLINIC | Age: 68
End: 2023-10-12
Payer: MEDICARE

## 2023-10-12 NOTE — TELEPHONE ENCOUNTER
I spoke to patient and gave an arrival time of 8:00 on 10/19/23 for [facility]. Patient was instructed to have a  for the day of the procedure and to arrive at the main entrance/registration area. Patient was educated about stent placement and informed that in the event of stent placement, the patient will be required to stay overnight for observation. Patient was instructed to continue all medications as usual. Patient was instructed to be NPO after midnight with sips of water as needed. Patient is agreeable with no other questions or concerns.

## 2023-11-02 ENCOUNTER — TELEPHONE (OUTPATIENT)
Dept: CARDIOLOGY | Facility: CLINIC | Age: 68
End: 2023-11-02
Payer: MEDICARE

## 2023-11-02 NOTE — TELEPHONE ENCOUNTER
I spoke to patient and gave an arrival time of 7:00 on 11/17/23 for [facility]. Patient was instructed to have a  for the day of the procedure and to arrive at the main entrance/registration area. Patient was educated about stent placement and informed that in the event of stent placement, the patient will be required to stay overnight for observation. Patient was instructed to continue all medications as usual. Patient was instructed to be NPO after midnight with sips of water as needed. Patient is agreeable with no other questions or concerns.

## 2023-11-09 DIAGNOSIS — N32.81 OAB (OVERACTIVE BLADDER): Chronic | ICD-10-CM

## 2023-11-09 RX ORDER — OXYBUTYNIN CHLORIDE 10 MG/1
20 TABLET, EXTENDED RELEASE ORAL DAILY
Qty: 180 TABLET | Refills: 1 | OUTPATIENT
Start: 2023-11-09

## 2023-11-15 NOTE — PRE-PROCEDURE INSTRUCTIONS
PATIENT INSTRUCTED TO BE:    - NPO AFTER MIDNIGHT EXCEPT CAN HAVE SIPS OF WATER WITH HIS MEDICATION PRIOR TO PROCEDURE    -  INSTRUCTED NO LOTIONS, JEWELRY, PIERCINGS, OR DEODORANT DAY OF THE PROCEDURE    - INSTRUCTED TO TAKE THE FOLLOWING MEDICATIONS THE DAY OF SURGERY:       ALBUTEROL INHALER IF NEEDED AND BRING WITH YOU, ASA, SYMBICORT INHALER AND BRING WITH YOU, VITAMIN D3, GABAPENTIN, HYDROCODONE , LEVOTHYROXINE, BEET ROOT-TART CHERRY, MULTIVITAMIN, OMEPRAZOLE, OXYBUTYNIN,     - INSTRUCTED PT TO FOLLOW ANY INSTRUCTIONS GIVEN BY HIS CARDIOLOGIST.    - INFORMED PT THEY ATTEMPT RADIAL APPROACH FIRST IF UNABLE TO PERFORM CATH WITH THAT APPROACH THEY WILL DO A FEMORAL APPROACH.  ALSO, INFORMED PT THEY WILL BE ON BEDREST POSTOP.  IF THE SURGEON FEELS  AN INTERVENTION OR STENTS NEEDS TO BE PLACED, HE/SHE WILL STAY OVER NIGHT FOR OBSERVATION AND MONITORING.     - INFORMED THE PATIENT HE CAN HAVE TWO VISITORS WITH HIM THE DAY OF THE PROCEDURE    - INSTRUCTED PT TO PARK IN THE PARKING GARAGE, ENTER THE HOSPITAL THRU ENTRANCE A AND  CHECK IN AT THE MAIN REGISTRATION DESK, AFTER REGISTRATION PT WILL BE TAKEN THE THE PREOP AREA FOR HIS PROCEDURE.    -ARRIVAL TIME GIVEN BY CARDIOLOGIST OFFICE, INFORMED PT IF ARRIVAL TIME CHANGES OR ADJUSTMENTS NEED TO BE MADE IN THEIR ARRIVAL TIME, HE/SHE WOULD RECEIVE A CALL.      - PATIENT VERBALIZED UNDERSTANDING    GET LABWORK AS ORDERED.

## 2023-11-15 NOTE — PROGRESS NOTES
Chief Complaint  Chief Complaint   Patient presents with    Follow-up     6 month     Hypothyroidism    Hyperlipidemia    Heartburn    Pulmonary Emphysema       Subjective          Veda Peña presents to [facility]  History of Present Illness    Hypothyroidism: Patient is currently on Tirosint and doing well. Patient states energy is good. Patient denies bowel changes and changes in hair, skin and nails. Patient has increased sweating with activity. Patient also c/o weight gain and inability to lose weight.     OAB: Patient is currently on Oxybutynin for overactive bladder. Patient states the medication is working well. Patient denies urinary frequency and leakage. Patient denies any adverse effects from medication.     GERD: Patient is currently on Omeprazole for the treatment of GERD. Patient is doing well without breakthrough symptoms.      HL: Patient presents for management of hyperlipidemia. Patient is currently on Simvastatin. Patient denies muscle cramps and muscle weakness. Patient is monitoring diet to help lower lipids.     COPD with emphysema: Pt is currently using Albuterol inhaler as needed but complains of increased shortness of air and cough. LDCT 12/22; no suspicious mass.     Patient c/o intermittent LBP that radiates into bilateral legs/thigh area; worse with standing/ambulation. TENs unit with some temporary relief of symptoms. Wears back brace with some relief of symptoms; denies trauma. Symptoms over 6mths.     Occasional trouble swallowing. Patient has nausea but no reflux symptoms.     Declines Pneumonia vaccine.     Colon: 9.22.22 Cologuard/ f/u 3 years  Mammo: 2.27.23  DXA: 11.29.21  LDCT: 12/28/22; f/u 1 year    Medical History: has a past medical history of Anemia (2021), Anxiety (2015), Arthritis, Cancer (2021-present), Cataract (2020), Cholelithiasis, CKD (chronic kidney disease), Colon polyp, COPD (chronic obstructive pulmonary disease), Coronary  artery disease, Depression, Diabetes mellitus (2023), Elevated glucose (04/14/2021), Fibromyalgia, primary, GERD (gastroesophageal reflux disease), Headache (2020), History of 2019 novel coronavirus disease (COVID-19) (04/14/2021), HL (hearing loss) (2021), Hyperlipidemia, Hypothyroidism, Irritable bowel syndrome, Low back pain (2022), Neuropathic pain, Neuropathy, Obesity, Osteopenia, Renal insufficiency, and Shortness of breath on exertion.   Surgical History: has a past surgical history that includes Anterior cruciate ligament repair (Right); Hysterectomy; Tubal ligation; Eye surgery; Cholecystectomy; and Cardiac catheterization (N/A, 11/17/2023).   Family History: family history includes Anxiety disorder in her daughter; Arthritis in her daughter, daughter, father, maternal grandmother, and mother; COPD in her daughter, daughter, and mother; Cancer in her brother, father, maternal grandmother, mother, paternal grandmother, and sister; Diabetes in her daughter, daughter, maternal grandmother, and mother; Early death in her daughter; Heart disease in her mother; Hypertension in her father, maternal aunt, maternal grandmother, and mother; Learning disabilities in her son; Thyroid disease in her daughter and daughter; Vision loss in her daughter and daughter.   Social History: reports that she quit smoking about 10 years ago. Her smoking use included cigarettes and electronic cigarette. She has a 80.00 pack-year smoking history. She has never used smokeless tobacco. She reports that she does not drink alcohol and does not use drugs.    Allergies: Tramadol, Codeine, Diazepam, Iron, Nickel, Prednisone, and Venlafaxine    There are no preventive care reminders to display for this patient.      Immunization History   Administered Date(s) Administered    Tdap 01/01/2020       Objective     Vitals:    11/16/23 0853   BP: 124/66   BP Location: Left arm   Patient Position: Sitting   Cuff Size: Adult   Pulse: 58   Resp: 20  "  SpO2: 95%   Weight: 77.1 kg (170 lb)   Height: 149.9 cm (59\")     Body mass index is 34.34 kg/m².     Wt Readings from Last 3 Encounters:   11/17/23 77.1 kg (170 lb)   11/16/23 77.1 kg (170 lb)   10/03/23 73.5 kg (162 lb)     BP Readings from Last 3 Encounters:   11/17/23 124/54   11/16/23 124/66   10/03/23 132/66     Patient Care Team:  Lina Nguyen PA as PCP - General (Family Medicine)    Physical Exam  Vitals and nursing note reviewed.   Constitutional:       Appearance: Normal appearance. She is obese.   HENT:      Head: Normocephalic and atraumatic.   Neck:      Vascular: No carotid bruit.      Comments: Thyroid : gland size normal, nontender, no nodules or masses present on palpation   Cardiovascular:      Rate and Rhythm: Normal rate and regular rhythm.      Pulses: Normal pulses.      Heart sounds: Normal heart sounds.   Pulmonary:      Effort: Pulmonary effort is normal.      Breath sounds: Normal breath sounds.   Musculoskeletal:      Cervical back: Neck supple. No tenderness.      Right lower leg: No edema.      Left lower leg: No edema.   Lymphadenopathy:      Cervical: No cervical adenopathy.   Neurological:      Mental Status: She is alert.   Psychiatric:         Mood and Affect: Mood normal.         Behavior: Behavior normal.           Result Review :                           Assessment and Plan      Diagnoses and all orders for this visit:    1. History of tobacco use (Primary)  -     Cancel: CT Chest Low Dose Wo; Future  -     CT Chest Low Dose Wo; Future    2. Pulmonary emphysema, unspecified emphysema type  Comments:  Stable on Symbicort 80/4.5 2 puffs twice daily and as needed Albuterol; continue and f/u in 6mths.  Orders:  -     albuterol sulfate  (90 Base) MCG/ACT inhaler; Inhale 2 puffs Every 6 (Six) Hours As Needed for Wheezing.  Dispense: 18 g; Refill: 1  -     budesonide-formoterol (Symbicort) 80-4.5 MCG/ACT inhaler; Inhale 2 puffs 2 (Two) Times a Day.  Dispense: 10.2 " g; Refill: 2    3. Hypothyroidism, unspecified type  Comments:  Currently stable: will continue with Levothyroxine 50mcg daily; check labs and follow up in 6mths  Orders:  -     TSH; Future  -     T4, Free; Future  -     levothyroxine sodium (TIROSINT) 50 MCG capsule; Take 1 capsule by mouth Daily.  Dispense: 90 capsule; Refill: 1    4. Gastroesophageal reflux disease, unspecified whether esophagitis present  Comments:  Stable on Omeprazole 20mg daily; check labs and follow up in 6mths.  Orders:  -     CBC & Differential; Future  -     omeprazole (priLOSEC) 20 MG capsule; Take 1 capsule by mouth Daily.  Dispense: 90 capsule; Refill: 1    5. OAB (overactive bladder)  Comments:  Stable on Oxybutynin XL 10mg daily; check ua and follow up in 6mths.  Orders:  -     oxybutynin XL (DITROPAN-XL) 10 MG 24 hr tablet; Take 1 tablet by mouth 2 (Two) Times a Day.  Dispense: 180 tablet; Refill: 1    6. Hyperlipidemia, unspecified hyperlipidemia type  Comments:  Stable on Zocor 20mg daily; check labs and follow up in 6mths.  Orders:  -     Comprehensive Metabolic Panel; Future  -     Lipid Panel; Future  -     simvastatin (ZOCOR) 20 MG tablet; Take 1 tablet by mouth Every Night.  Dispense: 90 tablet; Refill: 1    7. Elevated glucose  -     Hemoglobin A1c; Future            Follow Up     Return in about 6 months (around 5/16/2024).    Patient was given instructions and counseling regarding her condition or for health maintenance advice. Please see specific information pulled into the AVS if appropriate.

## 2023-11-16 ENCOUNTER — LAB (OUTPATIENT)
Dept: LAB | Facility: HOSPITAL | Age: 68
End: 2023-11-16
Payer: MEDICARE

## 2023-11-16 ENCOUNTER — OFFICE VISIT (OUTPATIENT)
Dept: FAMILY MEDICINE CLINIC | Facility: CLINIC | Age: 68
End: 2023-11-16
Payer: MEDICARE

## 2023-11-16 VITALS
HEIGHT: 59 IN | HEART RATE: 58 BPM | SYSTOLIC BLOOD PRESSURE: 124 MMHG | RESPIRATION RATE: 20 BRPM | WEIGHT: 170 LBS | OXYGEN SATURATION: 95 % | BODY MASS INDEX: 34.27 KG/M2 | DIASTOLIC BLOOD PRESSURE: 66 MMHG

## 2023-11-16 DIAGNOSIS — R73.09 ELEVATED GLUCOSE: ICD-10-CM

## 2023-11-16 DIAGNOSIS — E03.9 HYPOTHYROIDISM, UNSPECIFIED TYPE: Chronic | ICD-10-CM

## 2023-11-16 DIAGNOSIS — R79.89 ABNORMAL CBC: ICD-10-CM

## 2023-11-16 DIAGNOSIS — J43.9 PULMONARY EMPHYSEMA, UNSPECIFIED EMPHYSEMA TYPE: Chronic | ICD-10-CM

## 2023-11-16 DIAGNOSIS — K21.9 GASTROESOPHAGEAL REFLUX DISEASE, UNSPECIFIED WHETHER ESOPHAGITIS PRESENT: ICD-10-CM

## 2023-11-16 DIAGNOSIS — E78.5 HYPERLIPIDEMIA, UNSPECIFIED HYPERLIPIDEMIA TYPE: Chronic | ICD-10-CM

## 2023-11-16 DIAGNOSIS — Z87.891 HISTORY OF TOBACCO USE: Primary | ICD-10-CM

## 2023-11-16 DIAGNOSIS — R79.89 ABNORMAL CBC: Primary | ICD-10-CM

## 2023-11-16 DIAGNOSIS — K21.9 GASTROESOPHAGEAL REFLUX DISEASE, UNSPECIFIED WHETHER ESOPHAGITIS PRESENT: Chronic | ICD-10-CM

## 2023-11-16 DIAGNOSIS — N32.81 OAB (OVERACTIVE BLADDER): Chronic | ICD-10-CM

## 2023-11-16 LAB
ALBUMIN SERPL-MCNC: 4.4 G/DL (ref 3.5–5.2)
ALBUMIN/GLOB SERPL: 1.3 G/DL
ALP SERPL-CCNC: 89 U/L (ref 39–117)
ALT SERPL W P-5'-P-CCNC: 14 U/L (ref 1–33)
ANION GAP SERPL CALCULATED.3IONS-SCNC: 11.4 MMOL/L (ref 5–15)
AST SERPL-CCNC: 20 U/L (ref 1–32)
BASOPHILS # BLD AUTO: 0.04 10*3/MM3 (ref 0–0.2)
BASOPHILS NFR BLD AUTO: 0.5 % (ref 0–1.5)
BILIRUB SERPL-MCNC: 0.3 MG/DL (ref 0–1.2)
BUN SERPL-MCNC: 16 MG/DL (ref 8–23)
BUN/CREAT SERPL: 16 (ref 7–25)
CALCIUM SPEC-SCNC: 9.8 MG/DL (ref 8.6–10.5)
CHLORIDE SERPL-SCNC: 98 MMOL/L (ref 98–107)
CHOLEST SERPL-MCNC: 141 MG/DL (ref 0–200)
CO2 SERPL-SCNC: 26.6 MMOL/L (ref 22–29)
CREAT SERPL-MCNC: 1 MG/DL (ref 0.57–1)
DEPRECATED RDW RBC AUTO: 43.8 FL (ref 37–54)
EGFRCR SERPLBLD CKD-EPI 2021: 61.5 ML/MIN/1.73
EOSINOPHIL # BLD AUTO: 0.09 10*3/MM3 (ref 0–0.4)
EOSINOPHIL NFR BLD AUTO: 1.1 % (ref 0.3–6.2)
ERYTHROCYTE [DISTWIDTH] IN BLOOD BY AUTOMATED COUNT: 15.1 % (ref 12.3–15.4)
GLOBULIN UR ELPH-MCNC: 3.4 GM/DL
GLUCOSE SERPL-MCNC: 120 MG/DL (ref 65–99)
HBA1C MFR BLD: 6.3 % (ref 4.8–5.6)
HCT VFR BLD AUTO: 41.3 % (ref 34–46.6)
HDLC SERPL-MCNC: 49 MG/DL (ref 40–60)
HGB BLD-MCNC: 12.6 G/DL (ref 12–15.9)
IMM GRANULOCYTES # BLD AUTO: 0.06 10*3/MM3 (ref 0–0.05)
IMM GRANULOCYTES NFR BLD AUTO: 0.7 % (ref 0–0.5)
INR PPP: 0.92 (ref 0.86–1.15)
IRON 24H UR-MRATE: 56 MCG/DL (ref 37–145)
IRON SATN MFR SERPL: 15 % (ref 20–50)
LDLC SERPL CALC-MCNC: 75 MG/DL (ref 0–100)
LDLC/HDLC SERPL: 1.5 {RATIO}
LYMPHOCYTES # BLD AUTO: 2.68 10*3/MM3 (ref 0.7–3.1)
LYMPHOCYTES NFR BLD AUTO: 33.4 % (ref 19.6–45.3)
MCH RBC QN AUTO: 24.5 PG (ref 26.6–33)
MCHC RBC AUTO-ENTMCNC: 30.5 G/DL (ref 31.5–35.7)
MCV RBC AUTO: 80.2 FL (ref 79–97)
MONOCYTES # BLD AUTO: 0.53 10*3/MM3 (ref 0.1–0.9)
MONOCYTES NFR BLD AUTO: 6.6 % (ref 5–12)
NEUTROPHILS NFR BLD AUTO: 4.63 10*3/MM3 (ref 1.7–7)
NEUTROPHILS NFR BLD AUTO: 57.7 % (ref 42.7–76)
NRBC BLD AUTO-RTO: 0 /100 WBC (ref 0–0.2)
PLATELET # BLD AUTO: 289 10*3/MM3 (ref 140–450)
PMV BLD AUTO: 10.9 FL (ref 6–12)
POTASSIUM SERPL-SCNC: 4.6 MMOL/L (ref 3.5–5.2)
PROT SERPL-MCNC: 7.8 G/DL (ref 6–8.5)
PROTHROMBIN TIME: 12.5 SECONDS (ref 11.8–14.9)
RBC # BLD AUTO: 5.15 10*6/MM3 (ref 3.77–5.28)
SODIUM SERPL-SCNC: 136 MMOL/L (ref 136–145)
T4 FREE SERPL-MCNC: 1.18 NG/DL (ref 0.93–1.7)
TIBC SERPL-MCNC: 370 MCG/DL (ref 298–536)
TRANSFERRIN SERPL-MCNC: 248 MG/DL (ref 200–360)
TRIGL SERPL-MCNC: 93 MG/DL (ref 0–150)
TSH SERPL DL<=0.05 MIU/L-ACNC: 3.1 UIU/ML (ref 0.27–4.2)
VLDLC SERPL-MCNC: 17 MG/DL (ref 5–40)
WBC NRBC COR # BLD AUTO: 8.03 10*3/MM3 (ref 3.4–10.8)

## 2023-11-16 PROCEDURE — 84443 ASSAY THYROID STIM HORMONE: CPT

## 2023-11-16 PROCEDURE — 84439 ASSAY OF FREE THYROXINE: CPT

## 2023-11-16 PROCEDURE — 85025 COMPLETE CBC W/AUTO DIFF WBC: CPT

## 2023-11-16 PROCEDURE — 84466 ASSAY OF TRANSFERRIN: CPT

## 2023-11-16 PROCEDURE — 80053 COMPREHEN METABOLIC PANEL: CPT

## 2023-11-16 PROCEDURE — 83540 ASSAY OF IRON: CPT

## 2023-11-16 PROCEDURE — 85610 PROTHROMBIN TIME: CPT | Performed by: INTERNAL MEDICINE

## 2023-11-16 PROCEDURE — 83036 HEMOGLOBIN GLYCOSYLATED A1C: CPT

## 2023-11-16 PROCEDURE — 80061 LIPID PANEL: CPT

## 2023-11-16 RX ORDER — LEVOTHYROXINE SODIUM 50 UG/1
50 CAPSULE ORAL DAILY
Qty: 90 CAPSULE | Refills: 1 | Status: SHIPPED | OUTPATIENT
Start: 2023-11-16

## 2023-11-16 RX ORDER — LANOLIN ALCOHOL/MO/W.PET/CERES
1000 CREAM (GRAM) TOPICAL DAILY
COMMUNITY

## 2023-11-16 RX ORDER — BUDESONIDE AND FORMOTEROL FUMARATE DIHYDRATE 80; 4.5 UG/1; UG/1
2 AEROSOL RESPIRATORY (INHALATION)
Qty: 10.2 G | Refills: 2 | Status: SHIPPED | OUTPATIENT
Start: 2023-11-16

## 2023-11-16 RX ORDER — SIMVASTATIN 20 MG
20 TABLET ORAL NIGHTLY
Qty: 90 TABLET | Refills: 1 | Status: SHIPPED | OUTPATIENT
Start: 2023-11-16

## 2023-11-16 RX ORDER — OMEPRAZOLE 20 MG/1
20 CAPSULE, DELAYED RELEASE ORAL DAILY
Qty: 90 CAPSULE | Refills: 1 | Status: SHIPPED | OUTPATIENT
Start: 2023-11-16

## 2023-11-16 RX ORDER — ALBUTEROL SULFATE 90 UG/1
2 AEROSOL, METERED RESPIRATORY (INHALATION) EVERY 6 HOURS PRN
Qty: 18 G | Refills: 1 | Status: SHIPPED | OUTPATIENT
Start: 2023-11-16

## 2023-11-16 RX ORDER — OXYBUTYNIN CHLORIDE 10 MG/1
10 TABLET, EXTENDED RELEASE ORAL 2 TIMES DAILY
Qty: 180 TABLET | Refills: 1 | Status: SHIPPED | OUTPATIENT
Start: 2023-11-16

## 2023-11-17 ENCOUNTER — HOSPITAL ENCOUNTER (OUTPATIENT)
Facility: HOSPITAL | Age: 68
Setting detail: HOSPITAL OUTPATIENT SURGERY
Discharge: HOME | End: 2023-11-17
Attending: INTERNAL MEDICINE | Admitting: INTERNAL MEDICINE
Payer: MEDICARE

## 2023-11-17 VITALS
HEART RATE: 56 BPM | SYSTOLIC BLOOD PRESSURE: 124 MMHG | DIASTOLIC BLOOD PRESSURE: 54 MMHG | TEMPERATURE: 97.9 F | WEIGHT: 170 LBS | RESPIRATION RATE: 18 BRPM | HEIGHT: 59 IN | OXYGEN SATURATION: 95 % | BODY MASS INDEX: 34.27 KG/M2

## 2023-11-17 DIAGNOSIS — I25.10 CORONARY ARTERY CALCIFICATION: ICD-10-CM

## 2023-11-17 DIAGNOSIS — I25.84 CORONARY ARTERY CALCIFICATION: ICD-10-CM

## 2023-11-17 PROCEDURE — 25810000003 SODIUM CHLORIDE 0.9 % SOLUTION: Performed by: INTERNAL MEDICINE

## 2023-11-17 PROCEDURE — C1769 GUIDE WIRE: HCPCS | Performed by: INTERNAL MEDICINE

## 2023-11-17 PROCEDURE — 99152 MOD SED SAME PHYS/QHP 5/>YRS: CPT | Performed by: INTERNAL MEDICINE

## 2023-11-17 PROCEDURE — 25010000002 DIPHENHYDRAMINE PER 50 MG: Performed by: INTERNAL MEDICINE

## 2023-11-17 PROCEDURE — 93458 L HRT ARTERY/VENTRICLE ANGIO: CPT | Performed by: INTERNAL MEDICINE

## 2023-11-17 PROCEDURE — 25010000002 FENTANYL CITRATE (PF) 50 MCG/ML SOLUTION: Performed by: INTERNAL MEDICINE

## 2023-11-17 PROCEDURE — 99153 MOD SED SAME PHYS/QHP EA: CPT | Performed by: INTERNAL MEDICINE

## 2023-11-17 PROCEDURE — 25510000001 IOPAMIDOL PER 1 ML: Performed by: INTERNAL MEDICINE

## 2023-11-17 PROCEDURE — S0260 H&P FOR SURGERY: HCPCS | Performed by: INTERNAL MEDICINE

## 2023-11-17 PROCEDURE — 25010000002 HEPARIN (PORCINE) PER 1000 UNITS: Performed by: INTERNAL MEDICINE

## 2023-11-17 PROCEDURE — 25010000002 MIDAZOLAM PER 1MG: Performed by: INTERNAL MEDICINE

## 2023-11-17 PROCEDURE — C1894 INTRO/SHEATH, NON-LASER: HCPCS | Performed by: INTERNAL MEDICINE

## 2023-11-17 RX ORDER — DIPHENHYDRAMINE HYDROCHLORIDE 50 MG/ML
INJECTION INTRAMUSCULAR; INTRAVENOUS
Status: DISCONTINUED | OUTPATIENT
Start: 2023-11-17 | End: 2023-11-17 | Stop reason: HOSPADM

## 2023-11-17 RX ORDER — SODIUM CHLORIDE 9 MG/ML
40 INJECTION, SOLUTION INTRAVENOUS AS NEEDED
Status: DISCONTINUED | OUTPATIENT
Start: 2023-11-17 | End: 2023-11-17 | Stop reason: HOSPADM

## 2023-11-17 RX ORDER — MIDAZOLAM HYDROCHLORIDE 2 MG/2ML
INJECTION, SOLUTION INTRAMUSCULAR; INTRAVENOUS
Status: DISCONTINUED | OUTPATIENT
Start: 2023-11-17 | End: 2023-11-17 | Stop reason: HOSPADM

## 2023-11-17 RX ORDER — LIDOCAINE HYDROCHLORIDE 20 MG/ML
INJECTION, SOLUTION INFILTRATION; PERINEURAL
Status: DISCONTINUED | OUTPATIENT
Start: 2023-11-17 | End: 2023-11-17 | Stop reason: HOSPADM

## 2023-11-17 RX ORDER — SODIUM CHLORIDE 9 MG/ML
100 INJECTION, SOLUTION INTRAVENOUS CONTINUOUS
Status: ACTIVE | OUTPATIENT
Start: 2023-11-17 | End: 2023-11-17

## 2023-11-17 RX ORDER — SODIUM CHLORIDE 0.9 % (FLUSH) 0.9 %
10 SYRINGE (ML) INJECTION EVERY 12 HOURS SCHEDULED
Status: DISCONTINUED | OUTPATIENT
Start: 2023-11-17 | End: 2023-11-17 | Stop reason: HOSPADM

## 2023-11-17 RX ORDER — SODIUM CHLORIDE 0.9 % (FLUSH) 0.9 %
10 SYRINGE (ML) INJECTION AS NEEDED
Status: DISCONTINUED | OUTPATIENT
Start: 2023-11-17 | End: 2023-11-17 | Stop reason: HOSPADM

## 2023-11-17 RX ORDER — NITROGLYCERIN 0.4 MG/1
0.4 TABLET SUBLINGUAL
Status: DISCONTINUED | OUTPATIENT
Start: 2023-11-17 | End: 2023-11-17 | Stop reason: HOSPADM

## 2023-11-17 RX ORDER — FENTANYL CITRATE 50 UG/ML
INJECTION, SOLUTION INTRAMUSCULAR; INTRAVENOUS
Status: DISCONTINUED | OUTPATIENT
Start: 2023-11-17 | End: 2023-11-17 | Stop reason: HOSPADM

## 2023-11-17 RX ORDER — HEPARIN SODIUM 1000 [USP'U]/ML
INJECTION, SOLUTION INTRAVENOUS; SUBCUTANEOUS
Status: DISCONTINUED | OUTPATIENT
Start: 2023-11-17 | End: 2023-11-17 | Stop reason: HOSPADM

## 2023-11-17 RX ADMIN — SODIUM CHLORIDE 100 ML/HR: 9 INJECTION, SOLUTION INTRAVENOUS at 10:28

## 2023-11-17 RX ADMIN — SODIUM CHLORIDE 500 ML: 9 INJECTION, SOLUTION INTRAVENOUS at 07:19

## 2023-11-17 NOTE — NURSING NOTE
Patient returned from procedure stable. Patient's procedure site WNL and showed no signs of bleeding or swelling. VSS. Patient was educated on discharge instructions. Patient verbalized understanding. IV removed.

## 2023-11-17 NOTE — Clinical Note
Prepped: right groin and Right Wrist. Prepped with: ChloraPrep. The site was clipped. The patient was draped in a sterile fashion. 30-Jan-2018 07:34

## 2023-11-17 NOTE — Clinical Note
The radial pulses are +2 bilaterally. The ulnar pulses are +2 bilaterally. The femoral pulses are +2 bilaterally.

## 2023-11-17 NOTE — H&P
[facility]   CARDIOLOGY HISTORY AND PHYSICAL    Patient Name: Veda Peña  : 1955  MRN: [id number]  Primary Care Physician:  Lina Nguyen PA  Date of admission: 2023    Subjective   Subjective     Chief Complaint/reason for visit: Elective cardiac catheterization     HPI:    Veda Peña is a 68 y.o. female with COPD, hiatal hernia, hyperlipidemia and coronary artery calcification.  She is reporting intermittent episodes of chest pain for the past several months.  Previous SPECT stress test showed ischemic EKG changes changes within normal perfusion imaging.  She continues to report chest pain which was somewhat atypical and mostly while lying down.  During last office visit, the management options were discussed and decided to proceed with coronary angiography to rule out obstructive CAD as etiology.    Review of Systems   All systems were reviewed and negative except for: Chest pain, anxiety.  Negative for shortness of breath or palpitations.    Personal History     Past Medical History:   Diagnosis Date    Anemia     Anxiety     Arthritis     Cancer -present    Skin    Cataract     Cataract surgery in both eyes    Cholelithiasis     Surgery    CKD (chronic kidney disease)     FOLLOWED BY PCP    Colon polyp     Colonoscopy, removed and biopsied    COPD (chronic obstructive pulmonary disease)     Coronary artery disease     Depression     Diabetes mellitus     Pre- diabetes    Elevated glucose 2021    Fibromyalgia, primary     ?    GERD (gastroesophageal reflux disease)     Headache     History of 2019 novel coronavirus disease (COVID-19) 2021    HL (hearing loss)     Hearing aids    Hyperlipidemia     Hypothyroidism     Irritable bowel syndrome     Constipation..ongoing    Low back pain     Lower back, across hips, and down both legs    Neuropathic pain     Neuropathy     Obesity     Osteopenia     Renal insufficiency     Shortness of  breath on exertion      Family History: family history includes Anxiety disorder in her daughter; Arthritis in her daughter, daughter, father, maternal grandmother, and mother; COPD in her daughter, daughter, and mother; Cancer in her brother, father, maternal grandmother, mother, paternal grandmother, and sister; Diabetes in her daughter, daughter, maternal grandmother, and mother; Early death in her daughter; Heart disease in her mother; Hypertension in her father, maternal aunt, maternal grandmother, and mother; Learning disabilities in her son; Thyroid disease in her daughter and daughter; Vision loss in her daughter and daughter. Otherwise pertinent FHx was reviewed and not pertinent to current issue.    Social History:  reports that she quit smoking about 10 years ago. Her smoking use included cigarettes and electronic cigarette. She has a 80.00 pack-year smoking history. She has never used smokeless tobacco. She reports that she does not drink alcohol and does not use drugs.    Home Medications:  HYDROcodone-acetaminophen, Misc Natural Products, Senna-Natural Laxatives, Vitamin D3, albuterol sulfate HFA, aspirin, budesonide-formoterol, gabapentin, levothyroxine sodium, multivitamin with minerals, omeprazole, oxybutynin XL, simvastatin, and vitamin B-12      Allergies:  Allergies   Allergen Reactions    Tramadol Nausea And Vomiting    Codeine Palpitations    Diazepam Anxiety    Iron Other (See Comments)     REPORTS CAUSES HER TO HAVE URINARY TRACT INFECTION    Nickel Hives, Swelling and Rash    Prednisone Unknown - Low Severity     REPORTS MAKES HER REALLY ANGRY AND MAD    Venlafaxine Nausea And Vomiting       Objective   Objective     Vitals:   Temp:  [97.9 °F (36.6 °C)] 97.9 °F (36.6 °C)  Heart Rate:  [65] 65  Resp:  [16] 16  BP: (147)/(94) 147/94  Physical Exam    Constitutional: Awake, alert, pleasant, anxious   Eyes: PERRLA, sclerae anicteric, no conjunctival injection   HENT: NCAT, mucous membranes  moist   Neck: Supple, no thyromegaly, no lymphadenopathy, trachea midline   Respiratory: Clear to auscultation bilaterally, nonlabored respirations    Cardiovascular: RRR, no murmurs, rubs, or gallops, palpable pedal pulses bilaterally   Gastrointestinal: Positive bowel sounds, soft, nontender, nondistended   Musculoskeletal: No bilateral ankle edema, no clubbing or cyanosis to extremities   Psychiatric: Appropriate affect, cooperative   Neurologic: Oriented x 3, strength symmetric in all extremities, speech clear   Skin: No rashes     Result Review    Result Review:  I have personally reviewed the results from the time of this admission to 11/17/2023 09:12 EST and agree with these findings:  [x]  Laboratory  []  Microbiology  [x]  Radiology  [x]  EKG/Telemetry   [x]  Cardiology/Vascular   []  Pathology  [x]  Old records  []  Other:  Most notable findings include:     CBC          11/30/2022    13:31 5/24/2023    11:52 11/16/2023    10:33   CBC   WBC 9.21  8.00  8.03    RBC 5.27  4.90  5.15    Hemoglobin 12.5  12.1  12.6    Hematocrit 41.1  38.9  41.3    MCV 78.0  79.4  80.2    MCH 23.7  24.7  24.5    MCHC 30.4  31.1  30.5    RDW 15.3  14.3  15.1    Platelets 329  283  289       BMP          11/30/2022    13:31 5/24/2023    11:52 11/16/2023    10:33   BMP   BUN 12  19  16    Creatinine 0.83  1.12  1.00    Sodium 138  138  136    Potassium 4.6  4.7  4.6    Chloride 99  104  98    CO2 29.0  25.2  26.6    Calcium 10.5  10.2  9.8         Assessment & Plan   Assessment / Plan     Brief Patient Summary:  Veda Peña is a 68 y.o. female with COPD, hiatal hernia, hyperlipidemia and coronary artery calcification.  She is reporting intermittent episodes of chest pain    Active Hospital Problems:  Active Hospital Problems    Diagnosis     **Coronary artery calcification      Angina pectoris, other : Intermittent chest pain episodes with risk factors for coronary artery disease.  SPECT stress test showed EKG is a history of  ischemia with a normal perfusion imaging.  Ongoing symptoms    Plan:   We will proceed with Cardiac catheterization to delineate coronary anatomy.  The risks, benefits and alternatives of the test were explained in detail to the patient and she agreed to proceed.    She has a documented metal/nickel allergy.  Hence she will need an evaluation with allergy specialist with desensitization if she needs angioplasty and stents.    DVT prophylaxis:  DVT prophylaxis not indicated for this outpatient procedure    CODE STATUS: Full code     Admission Status:  I believe this patient meets outpatient status.    Electronically signed by Hema English MD, 11/17/23, 9:12 AM EST.

## 2023-12-01 ENCOUNTER — TELEPHONE (OUTPATIENT)
Dept: NEUROLOGY | Facility: CLINIC | Age: 68
End: 2023-12-01
Payer: MEDICARE

## 2023-12-01 NOTE — TELEPHONE ENCOUNTER
Patient called stating she was supposed to have surgery in September but never heard anything back.   Please advise

## 2023-12-05 NOTE — TELEPHONE ENCOUNTER
Advised patient I have sent cardiac clearance request and will contact her to schedule once that is received.

## 2023-12-06 ENCOUNTER — TELEPHONE (OUTPATIENT)
Dept: CARDIOLOGY | Facility: CLINIC | Age: 68
End: 2023-12-06
Payer: MEDICARE

## 2023-12-06 NOTE — TELEPHONE ENCOUNTER
----- Message from ANA Krishna sent at 12/5/2023  3:09 PM EST -----    ----- Message -----  From: Apoorva Lerma  Sent: 12/5/2023   2:11 PM EST  To: Hema English MD

## 2023-12-06 NOTE — TELEPHONE ENCOUNTER
Procedure: Minimal Invasive Lumbar Laminectomy    Medication Directive: Aspirin    PMH: Coronary Artery Calcification, HLD    Last Seen: 10/03/2023    [facility]: 11/17/23  Conclusions:  Nonobstructive single-vessel coronary artery disease involving right coronary artery.  Normal left ventricular end-diastolic pressure.        Recommendations:   We will continue maximal medical management for nonobstructive coronary artery disease.  Noncardiac etiology needs to proceed if chest pain persists.    ECHO: 03/17/23    Left ventricular systolic function is normal. Left ventricular ejection fraction appears to be 56 - 60%.    Left ventricular diastolic function is consistent with (grade I) impaired relaxation.    There are no significant valvular abnormalities.    Estimated right ventricular systolic pressure from tricuspid regurgitation is normal (<35 mmHg).

## 2023-12-06 NOTE — TELEPHONE ENCOUNTER
She may proceed with upcoming procedure at moderate risk for perioperative cardiac events.  She may hold aspirin for 7 days prior to procedure.

## 2023-12-08 NOTE — TELEPHONE ENCOUNTER
Spoke with patient and scheduled surgery for 1-12-24. Instructed on ASA, and sent instructions through [place] as well.

## 2023-12-13 NOTE — PROGRESS NOTES
Chief Complaint  Coronary Artery Disease and Follow-up    Subjective        History of Present Illness  Veda Peña presents to [facility] CARDIOLOGY   Ms. Peña is a 68-year-old female patient coming in today for follow-up after recent left heart catheterization.  She had previous workup for coronary artery calcifications with SPECT stress test, which did have ischemic changes on EKG but no perfusion defects.  At that time she initially attempted to pursue medical management, however she continued to have some episodes of chest pain, the decision was made to move forward with cardiac catheterization.  She underwent procedure which showed nonobstructive single-vessel disease in the right coronary artery.  She states she has not had any further episodes of chest pain/pressure.  Doing well on her current medications, denies any new concerns at visit today.   She is scheduled to undergo laminectomy with Dr. Carrillo kc.        Past History:     Coronary artery disease-[facility] 11/17/2023, nonobstructive single-vessel disease to the RCA  Chronic obstructive pulmonary disease  Hiatal hernia  Mixed hyperlipidemia     Past Medical History:   Diagnosis Date    Anemia 2021    Anxiety 2015    Arthritis     Cancer 2021-present    Cataract 2020    Cholelithiasis     CKD (chronic kidney disease)     Colon polyp     COPD (chronic obstructive pulmonary disease)     Coronary artery disease     Depression     Diabetes mellitus 2023    Elevated glucose 04/14/2021    Fibromyalgia, primary     GERD (gastroesophageal reflux disease)     Headache 2020    History of 2019 novel coronavirus disease (COVID-19) 04/14/2021    HL (hearing loss) 2021    Hyperlipidemia     Hypothyroidism     Irritable bowel syndrome     Low back pain 2022    Neuropathic pain     Neuropathy     Obesity     Osteopenia     Renal insufficiency     Shortness of breath on exertion        Allergies   Allergen Reactions    Tramadol Nausea And Vomiting     Codeine Palpitations    Diazepam Anxiety    Iron Other (See Comments)     REPORTS CAUSES HER TO HAVE URINARY TRACT INFECTION    Nickel Hives, Swelling and Rash    Prednisone Unknown - Low Severity     REPORTS MAKES HER REALLY ANGRY AND MAD    Venlafaxine Nausea And Vomiting        Past Surgical History:   Procedure Laterality Date    CARDIAC CATHETERIZATION N/A 11/17/2023    Procedure: Left Heart Cath with possible angioplasty;  Surgeon: Hema English MD;  Location: [facility] CATH INVASIVE LOCATION;  Service: Cardiovascular;  Laterality: N/A;    CHOLECYSTECTOMY      EYE SURGERY      cataract surgery of both eyes     HYSTERECTOMY      KNEE ACL RECONSTRUCTION Right     TUBAL ABDOMINAL LIGATION          Social History  She  reports that she quit smoking about 10 years ago. Her smoking use included cigarettes and electronic cigarette. She has a 80.00 pack-year smoking history. She has never used smokeless tobacco. She reports that she does not drink alcohol and does not use drugs.    Family History  Her family history includes Anxiety disorder in her daughter; Arthritis in her daughter, daughter, father, maternal grandmother, and mother; COPD in her daughter, daughter, and mother; Cancer in her brother, father, maternal grandmother, mother, paternal grandmother, and sister; Diabetes in her daughter, daughter, maternal grandmother, and mother; Early death in her daughter; Heart disease in her mother; Hypertension in her father, maternal aunt, maternal grandmother, and mother; Learning disabilities in her son; Thyroid disease in her daughter and daughter; Vision loss in her daughter and daughter.       Current Outpatient Medications on File Prior to Visit   Medication Sig    albuterol sulfate  (90 Base) MCG/ACT inhaler Inhale 2 puffs Every 6 (Six) Hours As Needed for Wheezing.    aspirin 81 MG EC tablet Take 1 tablet by mouth Daily.    budesonide-formoterol (Symbicort) 80-4.5 MCG/ACT inhaler Inhale 2 puffs 2  "(Two) Times a Day.    Cholecalciferol (Vitamin D3) 75 MCG (3000 UT) tablet Take 1 tablet by mouth Every Other Day.    gabapentin (NEURONTIN) 300 MG capsule Take 1 capsule by mouth 2 (Two) Times a Day As Needed.    HYDROcodone-acetaminophen (NORCO)  MG per tablet REPORTS TAKE 1 TABLET IN AM, A HALF TABLET MIDDAY, AND 1 TABLET IN PM    levothyroxine sodium (TIROSINT) 50 MCG capsule Take 1 capsule by mouth Daily.    Misc Natural Products (YUMVS BEET ROOT-TART CHERRY PO) Take  by mouth.    multivitamin with minerals tablet tablet Take 1 tablet by mouth Daily.    omeprazole (priLOSEC) 20 MG capsule Take 1 capsule by mouth Daily.    oxybutynin XL (DITROPAN-XL) 10 MG 24 hr tablet Take 1 tablet by mouth 2 (Two) Times a Day.    Senna-Natural Laxatives (SENOKOT LAXATIVE PO) Take 1 tablet by mouth Every Night.    simvastatin (ZOCOR) 20 MG tablet Take 1 tablet by mouth Every Night.    vitamin B-12 (CYANOCOBALAMIN) 1000 MCG tablet Take 1 tablet by mouth Daily.     No current facility-administered medications on file prior to visit.         Review of Systems   Constitutional:  Negative for fatigue.   Respiratory:  Negative for cough, chest tightness and shortness of breath.    Cardiovascular:  Negative for chest pain, palpitations and leg swelling.   Gastrointestinal:  Negative for nausea and vomiting.   Musculoskeletal:  Positive for arthralgias and back pain.   Neurological:  Negative for dizziness and syncope.   Hematological:  Does not bruise/bleed easily.        Objective   Vitals:    12/15/23 0854   BP: 140/67   Pulse: 63   Weight: 77.7 kg (171 lb 6.4 oz)   Height: 149.9 cm (59\")         Physical Exam  General : Alert, awake, no acute distress  Neck : Supple, no carotid bruit, no jugular venous distention  CVS : Regular rate and rhythm, no murmur, rubs or gallops  Lungs: Clear to auscultation bilaterally, no crackles or rhonchi  Abdomen: Soft, nontender, bowel sounds active  Extremities: Warm, well-perfused, no pedal " "edema      Result Review     The following data was reviewed by Constanza Callahan, ANA  No results found for: \"PROBNP\"  CMP          5/24/2023    11:52 11/16/2023    10:33   CMP   Glucose 104  120    BUN 19  16    Creatinine 1.12  1.00    EGFR 54.0  61.5    Sodium 138  136    Potassium 4.7  4.6    Chloride 104  98    Calcium 10.2  9.8    Total Protein 7.3  7.8    Albumin 4.4  4.4    Globulin 2.9  3.4    Total Bilirubin 0.5  0.3    Alkaline Phosphatase 72  89    AST (SGOT) 23  20    ALT (SGPT) 22  14    Albumin/Globulin Ratio 1.5  1.3    BUN/Creatinine Ratio 17.0  16.0    Anion Gap 8.8  11.4      CBC w/diff          5/24/2023    11:52 11/16/2023    10:33   CBC w/Diff   WBC 8.00  8.03    RBC 4.90  5.15    Hemoglobin 12.1  12.6    Hematocrit 38.9  41.3    MCV 79.4  80.2    MCH 24.7  24.5    MCHC 31.1  30.5    RDW 14.3  15.1    Platelets 283  289    Neutrophil Rel % 54.9  57.7    Immature Granulocyte Rel % 0.6  0.7    Lymphocyte Rel % 35.3  33.4    Monocyte Rel % 6.9  6.6    Eosinophil Rel % 1.5  1.1    Basophil Rel % 0.8  0.5       Lab Results   Component Value Date    TSH 3.100 11/16/2023      Lab Results   Component Value Date    FREET4 1.18 11/16/2023      No results found for: \"DDIMERQUANT\"  No results found for: \"MG\"   No results found for: \"DIGOXIN\"   No results found for: \"TROPONINT\"        Lipid Panel          5/24/2023    11:52 11/16/2023    10:33   Lipid Panel   Total Cholesterol 146  141    Triglycerides 83  93    HDL Cholesterol 50  49    VLDL Cholesterol 16  17    LDL Cholesterol  80  75    LDL/HDL Ratio 1.59  1.50        Results for orders placed during the hospital encounter of 03/17/23    Adult Transthoracic Echo Complete W/ Cont if Necessary Per Protocol    Interpretation Summary    Left ventricular systolic function is normal. Left ventricular ejection fraction appears to be 56 - 60%.    Left ventricular diastolic function is consistent with (grade I) impaired relaxation.    There are no significant " valvular abnormalities.    Estimated right ventricular systolic pressure from tricuspid regurgitation is normal (<35 mmHg).    Results for orders placed during the hospital encounter of 03/17/23    Stress Test With Myocardial Perfusion One Day    Interpretation Summary    Myocardial perfusion imaging indicates a normal myocardial perfusion study with no evidence of ischemia.    Left ventricular ejection fraction is hyperdynamic (Calculated EF > 70%).    There was no chest pain with regadenoson infusion.  EKG on record at infusion showed 1 mm horizontal ST segment depressions, suggestive of ischemia.    Impressions are consistent with a low risk study.    Conclusion : Stress EKG showed ischemic changes, but SPECT stress test did not show any perfusion defects.  Clinical correlation recommended.      CARDIAC CATHETERIZATION   11/17/2023  Conclusions:  Nonobstructive single-vessel coronary artery disease involving right coronary artery.  Normal left ventricular end-diastolic pressure.          Assessment and Plan   Diagnoses and all orders for this visit:    1. Non-occlusive coronary artery disease (Primary)  Assessment & Plan:  She is stable without any further symptoms of angina.  She underwent recent left heart catheterization, which showed single-vessel disease in the RCA.  For now continue cardioprotective measures with daily aspirin and statin therapy.      2. Mixed hyperlipidemia  Assessment & Plan:  Recommend LDL goal below 70 due to presence of coronary disease, she is reasonably close to goal with most recent LDL of 75.  Continue simvastatin 20 mg nightly.  If levels trend up in the future, we can switch her over to a higher intensity statin.             Follow Up   Return in about 1 year (around 12/15/2024) for Next scheduled follow up, with Dr. English.    Patient was given instructions and counseling regarding her condition or for health maintenance advice. Please see specific information pulled into the  AVS if appropriate.     Signed,  Constanza Callahan, ANA  12/15/2023     Dictated Utilizing Dragon Dictation: Please note that portions of this note were completed with a voice recognition program.  Part of this note may be an electronic transcription/translation of spoken language to printed text using the Dragon Dictation System.

## 2023-12-15 ENCOUNTER — OFFICE VISIT (OUTPATIENT)
Dept: CARDIOLOGY | Facility: CLINIC | Age: 68
End: 2023-12-15
Payer: MEDICARE

## 2023-12-15 VITALS
WEIGHT: 171.4 LBS | BODY MASS INDEX: 34.56 KG/M2 | DIASTOLIC BLOOD PRESSURE: 67 MMHG | HEIGHT: 59 IN | HEART RATE: 63 BPM | SYSTOLIC BLOOD PRESSURE: 140 MMHG

## 2023-12-15 DIAGNOSIS — I25.10 NON-OCCLUSIVE CORONARY ARTERY DISEASE: Primary | ICD-10-CM

## 2023-12-15 DIAGNOSIS — E78.2 MIXED HYPERLIPIDEMIA: ICD-10-CM

## 2023-12-15 NOTE — ASSESSMENT & PLAN NOTE
Recommend LDL goal below 70 due to presence of coronary disease, she is reasonably close to goal with most recent LDL of 75.  Continue simvastatin 20 mg nightly.  If levels trend up in the future, we can switch her over to a higher intensity statin.

## 2023-12-15 NOTE — ASSESSMENT & PLAN NOTE
She is stable without any further symptoms of angina.  She underwent recent left heart catheterization, which showed single-vessel disease in the RCA.  For now continue cardioprotective measures with daily aspirin and statin therapy.

## 2023-12-19 PROBLEM — M54.16 SPINAL STENOSIS OF LUMBAR REGION WITH RADICULOPATHY: Status: ACTIVE | Noted: 2023-09-21

## 2023-12-19 PROBLEM — M48.061 SPINAL STENOSIS OF LUMBAR REGION WITH RADICULOPATHY: Status: ACTIVE | Noted: 2023-09-21

## 2023-12-29 ENCOUNTER — HOSPITAL ENCOUNTER (OUTPATIENT)
Dept: CT IMAGING | Facility: HOSPITAL | Age: 68
Discharge: HOME | End: 2023-12-29
Admitting: PHYSICIAN ASSISTANT
Payer: MEDICARE

## 2023-12-29 DIAGNOSIS — Z87.891 HISTORY OF TOBACCO USE: ICD-10-CM

## 2023-12-29 PROCEDURE — 71271 CT THORAX LUNG CANCER SCR C-: CPT

## 2024-01-11 ENCOUNTER — ANESTHESIA EVENT (OUTPATIENT)
Dept: PERIOP | Facility: HOSPITAL | Age: 69
End: 2024-01-11
Payer: MEDICARE

## 2024-01-11 RX ORDER — MULTIVIT-MIN/IRON/FOLIC ACID/K 18-600-40
1 CAPSULE ORAL DAILY
COMMUNITY

## 2024-01-11 RX ORDER — CETIRIZINE HYDROCHLORIDE 10 MG/1
10 TABLET ORAL DAILY PRN
COMMUNITY

## 2024-01-11 NOTE — PRE-PROCEDURE INSTRUCTIONS
IMPORTANT INSTRUCTIONS - PRE-ADMISSION TESTING  DO NOT EAT OR CHEW anything after midnight the night before your procedure.    You may have SIPS NO RED CLEAR liquids up to ____2__ hours prior to ARRIVAL time.   Take the following medications the morning of your procedure with JUST A SIP OF WATER:  ____ALBUTEROL INHALER IF NEEDED AND BRING WITH YOU, SYMBICORT INHALER AND BRING WITH YOU, CETIRIZINE IF NEEDED, GABAPENTIN IF NEEDED, HYDROCODONE, LEVOTHYROXINE, OMEPRAZOLE, OXYBUTYNIN___________________________________________________________________________________________________________________________________________________________________________________    DO NOT BRING your medications to the hospital with you, UNLESS something has changed since your PRE-Admission Testing appointment.  Hold all vitamins, supplements, and NSAIDS (Non- steroidal anti-inflammatory meds) for one week prior to surgery (you MAY take Tylenol or Acetaminophen).  If you are diabetic, check your blood sugar the morning of your procedure. If it is less than 70 or if you are feeling symptomatic, call the following number for further instructions: [phone]  Use your inhalers/nebulizers as usual, the morning of your procedure. BRING YOUR INHALERS with you.   Bring your CPAP or BIPAP to hospital, ONLY IF YOU WILL BE SPENDING THE NIGHT.   Make sure you have a ride home and have someone who will stay with you the day of your procedure after you go home.  If you have any questions, please call your Pre-Admission Testing Nurse, ___GRANT_____________ at [phone]___.   Per anesthesia request, do not smoke for 24 hours before your procedure or as instructed by your surgeon.    BATHING INSTRUCTIONS GIVEN. NO JEWELRY DAY OF PROCEDURE. NO NAIL POLISH UPPER OR LOWER EXTREMITIES.  ENTRANCE A, ELEVATOR A , 3RD FLOOR DAY OF PROCEDURE

## 2024-01-12 ENCOUNTER — HOSPITAL ENCOUNTER (OUTPATIENT)
Facility: HOSPITAL | Age: 69
Setting detail: HOSPITAL OUTPATIENT SURGERY
Discharge: HOME | End: 2024-01-12
Attending: NEUROLOGICAL SURGERY | Admitting: NEUROLOGICAL SURGERY
Payer: MEDICARE

## 2024-01-12 ENCOUNTER — ANESTHESIA (OUTPATIENT)
Dept: PERIOP | Facility: HOSPITAL | Age: 69
End: 2024-01-12
Payer: MEDICARE

## 2024-01-12 ENCOUNTER — APPOINTMENT (OUTPATIENT)
Dept: GENERAL RADIOLOGY | Facility: HOSPITAL | Age: 69
End: 2024-01-12
Payer: MEDICARE

## 2024-01-12 VITALS
SYSTOLIC BLOOD PRESSURE: 134 MMHG | RESPIRATION RATE: 16 BRPM | HEART RATE: 98 BPM | BODY MASS INDEX: 34.13 KG/M2 | WEIGHT: 169.31 LBS | HEIGHT: 59 IN | OXYGEN SATURATION: 98 % | DIASTOLIC BLOOD PRESSURE: 52 MMHG | TEMPERATURE: 97.2 F

## 2024-01-12 DIAGNOSIS — M48.061 SPINAL STENOSIS OF LUMBAR REGION WITH RADICULOPATHY: ICD-10-CM

## 2024-01-12 DIAGNOSIS — M54.16 SPINAL STENOSIS OF LUMBAR REGION WITH RADICULOPATHY: ICD-10-CM

## 2024-01-12 LAB
ANION GAP SERPL CALCULATED.3IONS-SCNC: 11.9 MMOL/L (ref 5–15)
BUN SERPL-MCNC: 20 MG/DL (ref 8–23)
BUN/CREAT SERPL: 18 (ref 7–25)
CALCIUM SPEC-SCNC: 9.7 MG/DL (ref 8.6–10.5)
CHLORIDE SERPL-SCNC: 101 MMOL/L (ref 98–107)
CO2 SERPL-SCNC: 26.1 MMOL/L (ref 22–29)
CREAT SERPL-MCNC: 1.11 MG/DL (ref 0.57–1)
EGFRCR SERPLBLD CKD-EPI 2021: 54.3 ML/MIN/1.73
GLUCOSE BLDC GLUCOMTR-MCNC: 143 MG/DL (ref 70–99)
GLUCOSE SERPL-MCNC: 109 MG/DL (ref 65–99)
POTASSIUM SERPL-SCNC: 4.5 MMOL/L (ref 3.5–5.2)
SODIUM SERPL-SCNC: 139 MMOL/L (ref 136–145)

## 2024-01-12 PROCEDURE — 25010000002 HYDROMORPHONE 1 MG/ML SOLUTION: Performed by: NURSE ANESTHETIST, CERTIFIED REGISTERED

## 2024-01-12 PROCEDURE — 25010000002 CEFAZOLIN IN DEXTROSE 2000 MG/ 100 ML SOLUTION: Performed by: NEUROLOGICAL SURGERY

## 2024-01-12 PROCEDURE — 25010000002 FENTANYL CITRATE (PF) 50 MCG/ML SOLUTION: Performed by: NURSE ANESTHETIST, CERTIFIED REGISTERED

## 2024-01-12 PROCEDURE — 82948 REAGENT STRIP/BLOOD GLUCOSE: CPT

## 2024-01-12 PROCEDURE — 25010000002 ONDANSETRON PER 1 MG: Performed by: NURSE ANESTHETIST, CERTIFIED REGISTERED

## 2024-01-12 PROCEDURE — S0260 H&P FOR SURGERY: HCPCS | Performed by: NEUROLOGICAL SURGERY

## 2024-01-12 PROCEDURE — 63047 LAM FACETEC & FORAMOT LUMBAR: CPT | Performed by: SPECIALIST/TECHNOLOGIST, OTHER

## 2024-01-12 PROCEDURE — 25010000002 SUGAMMADEX 200 MG/2ML SOLUTION: Performed by: NURSE ANESTHETIST, CERTIFIED REGISTERED

## 2024-01-12 PROCEDURE — 25010000002 PROPOFOL 10 MG/ML EMULSION: Performed by: NURSE ANESTHETIST, CERTIFIED REGISTERED

## 2024-01-12 PROCEDURE — 25810000003 LACTATED RINGERS PER 1000 ML: Performed by: ANESTHESIOLOGY

## 2024-01-12 PROCEDURE — 63047 LAM FACETEC & FORAMOT LUMBAR: CPT | Performed by: NEUROLOGICAL SURGERY

## 2024-01-12 PROCEDURE — 76000 FLUOROSCOPY <1 HR PHYS/QHP: CPT

## 2024-01-12 PROCEDURE — 0 HYDROMORPHONE 1 MG/ML SOLUTION: Performed by: NURSE ANESTHETIST, CERTIFIED REGISTERED

## 2024-01-12 PROCEDURE — 80048 BASIC METABOLIC PNL TOTAL CA: CPT | Performed by: ANESTHESIOLOGY

## 2024-01-12 PROCEDURE — 25010000002 METHYLPREDNISOLONE PER 40 MG: Performed by: NEUROLOGICAL SURGERY

## 2024-01-12 RX ORDER — MAGNESIUM HYDROXIDE 1200 MG/15ML
LIQUID ORAL AS NEEDED
Status: DISCONTINUED | OUTPATIENT
Start: 2024-01-12 | End: 2024-01-12 | Stop reason: HOSPADM

## 2024-01-12 RX ORDER — FENTANYL CITRATE 50 UG/ML
INJECTION, SOLUTION INTRAMUSCULAR; INTRAVENOUS AS NEEDED
Status: DISCONTINUED | OUTPATIENT
Start: 2024-01-12 | End: 2024-01-12 | Stop reason: SURG

## 2024-01-12 RX ORDER — ROCURONIUM BROMIDE 10 MG/ML
INJECTION, SOLUTION INTRAVENOUS AS NEEDED
Status: DISCONTINUED | OUTPATIENT
Start: 2024-01-12 | End: 2024-01-12 | Stop reason: SURG

## 2024-01-12 RX ORDER — OXYCODONE HYDROCHLORIDE 5 MG/1
5 TABLET ORAL
Status: DISCONTINUED | OUTPATIENT
Start: 2024-01-12 | End: 2024-01-12 | Stop reason: HOSPADM

## 2024-01-12 RX ORDER — ONDANSETRON 2 MG/ML
4 INJECTION INTRAMUSCULAR; INTRAVENOUS ONCE AS NEEDED
Status: DISCONTINUED | OUTPATIENT
Start: 2024-01-12 | End: 2024-01-12 | Stop reason: HOSPADM

## 2024-01-12 RX ORDER — PROPOFOL 10 MG/ML
VIAL (ML) INTRAVENOUS AS NEEDED
Status: DISCONTINUED | OUTPATIENT
Start: 2024-01-12 | End: 2024-01-12 | Stop reason: SURG

## 2024-01-12 RX ORDER — LIDOCAINE HYDROCHLORIDE AND EPINEPHRINE 10; 10 MG/ML; UG/ML
INJECTION, SOLUTION INFILTRATION; PERINEURAL AS NEEDED
Status: DISCONTINUED | OUTPATIENT
Start: 2024-01-12 | End: 2024-01-12 | Stop reason: HOSPADM

## 2024-01-12 RX ORDER — ONDANSETRON 2 MG/ML
INJECTION INTRAMUSCULAR; INTRAVENOUS AS NEEDED
Status: DISCONTINUED | OUTPATIENT
Start: 2024-01-12 | End: 2024-01-12 | Stop reason: SURG

## 2024-01-12 RX ORDER — METHYLPREDNISOLONE ACETATE 40 MG/ML
INJECTION, SUSPENSION INTRA-ARTICULAR; INTRALESIONAL; INTRAMUSCULAR; SOFT TISSUE AS NEEDED
Status: DISCONTINUED | OUTPATIENT
Start: 2024-01-12 | End: 2024-01-12 | Stop reason: HOSPADM

## 2024-01-12 RX ORDER — SODIUM CHLORIDE, SODIUM LACTATE, POTASSIUM CHLORIDE, CALCIUM CHLORIDE 600; 310; 30; 20 MG/100ML; MG/100ML; MG/100ML; MG/100ML
9 INJECTION, SOLUTION INTRAVENOUS CONTINUOUS PRN
Status: DISCONTINUED | OUTPATIENT
Start: 2024-01-12 | End: 2024-01-12 | Stop reason: HOSPADM

## 2024-01-12 RX ORDER — OXYCODONE HYDROCHLORIDE AND ACETAMINOPHEN 5; 325 MG/1; MG/1
1 TABLET ORAL EVERY 6 HOURS PRN
Qty: 20 TABLET | Refills: 0 | Status: SHIPPED | OUTPATIENT
Start: 2024-01-12

## 2024-01-12 RX ORDER — EPHEDRINE SULFATE 50 MG/ML
INJECTION, SOLUTION INTRAVENOUS AS NEEDED
Status: DISCONTINUED | OUTPATIENT
Start: 2024-01-12 | End: 2024-01-12 | Stop reason: SURG

## 2024-01-12 RX ORDER — LIDOCAINE HYDROCHLORIDE 20 MG/ML
INJECTION, SOLUTION EPIDURAL; INFILTRATION; INTRACAUDAL; PERINEURAL AS NEEDED
Status: DISCONTINUED | OUTPATIENT
Start: 2024-01-12 | End: 2024-01-12 | Stop reason: SURG

## 2024-01-12 RX ORDER — ACETAMINOPHEN 500 MG
1000 TABLET ORAL ONCE
Status: COMPLETED | OUTPATIENT
Start: 2024-01-12 | End: 2024-01-12

## 2024-01-12 RX ORDER — PROMETHAZINE HYDROCHLORIDE 12.5 MG/1
25 TABLET ORAL ONCE AS NEEDED
Status: DISCONTINUED | OUTPATIENT
Start: 2024-01-12 | End: 2024-01-12 | Stop reason: HOSPADM

## 2024-01-12 RX ORDER — PROMETHAZINE HYDROCHLORIDE 25 MG/1
25 SUPPOSITORY RECTAL ONCE AS NEEDED
Status: DISCONTINUED | OUTPATIENT
Start: 2024-01-12 | End: 2024-01-12 | Stop reason: HOSPADM

## 2024-01-12 RX ORDER — CEFAZOLIN SODIUM 2 G/100ML
2 INJECTION, SOLUTION INTRAVENOUS ONCE
Status: COMPLETED | OUTPATIENT
Start: 2024-01-12 | End: 2024-01-12

## 2024-01-12 RX ADMIN — LIDOCAINE HYDROCHLORIDE 80 MG: 20 INJECTION, SOLUTION EPIDURAL; INFILTRATION; INTRACAUDAL; PERINEURAL at 07:48

## 2024-01-12 RX ADMIN — FENTANYL CITRATE 50 MCG: 50 INJECTION, SOLUTION INTRAMUSCULAR; INTRAVENOUS at 07:46

## 2024-01-12 RX ADMIN — HYDROMORPHONE HYDROCHLORIDE 0.25 MG: 1 INJECTION, SOLUTION INTRAMUSCULAR; INTRAVENOUS; SUBCUTANEOUS at 08:06

## 2024-01-12 RX ADMIN — PROPOFOL 120 MG: 10 INJECTION, EMULSION INTRAVENOUS at 07:50

## 2024-01-12 RX ADMIN — ROCURONIUM BROMIDE 50 MG: 10 INJECTION, SOLUTION INTRAVENOUS at 07:50

## 2024-01-12 RX ADMIN — CEFAZOLIN SODIUM 2 G: 2 INJECTION, SOLUTION INTRAVENOUS at 07:52

## 2024-01-12 RX ADMIN — EPHEDRINE SULFATE 5 MG: 50 INJECTION INTRAVENOUS at 08:46

## 2024-01-12 RX ADMIN — HYDROMORPHONE HYDROCHLORIDE 0.25 MG: 1 INJECTION, SOLUTION INTRAMUSCULAR; INTRAVENOUS; SUBCUTANEOUS at 09:28

## 2024-01-12 RX ADMIN — ACETAMINOPHEN 1000 MG: 500 TABLET ORAL at 07:35

## 2024-01-12 RX ADMIN — FENTANYL CITRATE 50 MCG: 50 INJECTION, SOLUTION INTRAMUSCULAR; INTRAVENOUS at 07:54

## 2024-01-12 RX ADMIN — SUGAMMADEX 150 MG: 100 INJECTION, SOLUTION INTRAVENOUS at 08:57

## 2024-01-12 RX ADMIN — SODIUM CHLORIDE, POTASSIUM CHLORIDE, SODIUM LACTATE AND CALCIUM CHLORIDE 9 ML/HR: 600; 310; 30; 20 INJECTION, SOLUTION INTRAVENOUS at 07:35

## 2024-01-12 RX ADMIN — EPHEDRINE SULFATE 5 MG: 50 INJECTION INTRAVENOUS at 08:10

## 2024-01-12 RX ADMIN — ROCURONIUM BROMIDE 10 MG: 10 INJECTION, SOLUTION INTRAVENOUS at 08:36

## 2024-01-12 RX ADMIN — ONDANSETRON 4 MG: 2 INJECTION INTRAMUSCULAR; INTRAVENOUS at 08:52

## 2024-01-12 RX ADMIN — EPHEDRINE SULFATE 5 MG: 50 INJECTION INTRAVENOUS at 08:05

## 2024-01-12 RX ADMIN — EPHEDRINE SULFATE 5 MG: 50 INJECTION INTRAVENOUS at 08:25

## 2024-01-12 RX ADMIN — EPHEDRINE SULFATE 5 MG: 50 INJECTION INTRAVENOUS at 08:35

## 2024-01-12 NOTE — OP NOTE
LUMBAR LAMINECTOMY DISCECTOMY MINIMALLY INVASIVE  Procedure Report    Patient Name:  Veda Peña  YOB: 1955    Date of Surgery:  1/12/2024     Indications: Lumbar 3-lumbar 4 spinal stenosis with radiculopathy.    Pre-op Diagnosis:   Spinal stenosis of lumbar region with radiculopathy [M48.061, M54.16]       Post-Op Diagnosis Codes:     * Spinal stenosis of lumbar region with radiculopathy [M48.061, M54.16]    Procedure/CPT® Codes:  99022, 38741    Procedure(s):  MINIMALLY INVASIVE LUMBAR LAMINECTOMY, left approach, lumbar 3-lumbar 4    Staff:  Surgeon(s):  Dirk Vizcaino MD    Assistant: Florence Garcia RN CSA    Anesthesia: General    Estimated Blood Loss: 3 mL      Specimen:          Lamina and ligament (none to pathology)        Findings: Bilateral lateral recess stenosis    Complications: No apparent intraoperative complications    Description of Procedure:  After informed consent was obtained, the patient was brought to the operating room.  After induction of adequate general endotracheal anesthesia the patient was placed in the prone position on the Carrillo table utilizing the Xavi frame.  All pressure points were padded.  The back was prepped and draped in the typical fashion.  The midline was marked and a line approximately 2-1/2 cm to the left of midline was drawn.  On this line the L3-4 interspace was localized using a spinal needle and the C arm.  A 2 cm incision was then made over the level.  The Boss tubular retractor system was used to dilate the tissue docking at L3-4.  The level was again confirmed with fluoroscopy.  The microscope was brought in and used for the remainder of the case for improved magnification and illumination.  The lamina was cleared of soft tissue and the Kerrison punches were used to remove the lamina above the insertion of the ligamentum flavum.  The ligamentum flavum was then resected inferiorly and laterally undercutting the medial facet.  After  undercutting the medial facet and ligament, the ball probe passed freely along the left L4 nerve root.  The tubular retractor was tilted medially to allow exposure of the patient's right lateral recess.  The ligament was removed from its insertion at L4 and the right lateral recess was then undercut using the Kerrison punches until a ball probe passed freely along the right L4 nerve root. After decompression was felt to be adequate, hemostasis was obtained using the bipolar electrocautery as well as Gelfoam.  The wound was irrigated with normal saline.  40 mg of Depo-Medrol was placed over the spinal sac and the L4 nerve roots.    The tubular retractor was removed and hemostasis assured in the soft tissue.  The incision was reapproximated using interrupted 0 Vicryl in the fascia and a 2-0 Vicryl in the subcutaneous tissue.  The wound was dressed with Mastisol, Steri-Strips, Telfa and a Tegaderm.   Assistant: Florence Garcia RN CSA  was responsible for performing the following activities: Retraction, Suction, Irrigation, Closing, and Placing Dressing and their skilled assistance was necessary for the success of this case.    Dirk Vizcaino MD     Date: 1/12/2024  Time: 09:08 EST

## 2024-01-12 NOTE — ANESTHESIA POSTPROCEDURE EVALUATION
Patient: Veda Peña    Procedure Summary       Date: 01/12/24 Room / Location: [facility] OR 05 / [facility] MAIN OR    Anesthesia Start: 0743 Anesthesia Stop: 0909    Procedure: MINIMALLY INVASIVE LUMBAR LAMINECTOMY, left approach, lumbar 3-lumbar 4 (Left: Back) Diagnosis:       Spinal stenosis of lumbar region with radiculopathy      (Spinal stenosis of lumbar region with radiculopathy [M48.061, M54.16])    Surgeons: Dirk Vizcaino MD Provider: Michael Mejia MD    Anesthesia Type: general ASA Status: 3            Anesthesia Type: general    Vitals  Vitals Value Taken Time   /57 01/12/24 0942   Temp 36.4 °C (97.5 °F) 01/12/24 0935   Pulse 65 01/12/24 0943   Resp 16 01/12/24 0930   SpO2 99 % 01/12/24 0943   Vitals shown include unfiled device data.        Post Anesthesia Care and Evaluation    Patient location during evaluation: bedside  Patient participation: complete - patient participated  Level of consciousness: awake  Pain management: adequate    Airway patency: patent  PONV Status: none  Cardiovascular status: acceptable and stable  Respiratory status: acceptable  Hydration status: acceptable    Comments: An Anesthesiologist personally participated in the most demanding procedures (including induction and emergence if applicable) in the anesthesia plan, monitored the course of anesthesia administration at frequent intervals and remained physically present and available for immediate diagnosis and treatment of emergencies.

## 2024-01-12 NOTE — ANESTHESIA PREPROCEDURE EVALUATION
Anesthesia Evaluation     Patient summary reviewed and Nursing notes reviewed   no history of anesthetic complications:   NPO Solid Status: > 8 hours  NPO Liquid Status: > 2 hours           Airway   Mallampati: II  TM distance: >3 FB  Neck ROM: full  No difficulty expected  Dental    (+) edentulous, upper dentures and lower dentures    Pulmonary - negative pulmonary ROS and normal exam    breath sounds clear to auscultation  (+) COPD (feels breathing is good this AM),shortness of breath  Cardiovascular - negative cardio ROS and normal exam  Exercise tolerance: good (4-7 METS)    Rhythm: regular  Rate: normal    (+) CAD (nonocclusive dz), hyperlipidemia      Neuro/Psych- negative ROS  (+) headaches, numbness, psychiatric history  GI/Hepatic/Renal/Endo - negative ROS   (+) obesity, GERD, renal disease (CKD III)-, diabetes mellitus, thyroid problem hypothyroidism    Musculoskeletal (-) negative ROS    (+) myalgias  Abdominal    Substance History - negative use     OB/GYN negative ob/gyn ROS         Other - negative ROS  arthritis,   history of cancer    ROS/Med Hx Other: >4METS, HX CAD,COPD,ANEMIA,DM,CKD. CARDS W/U FOR C/P: ECHO 3/23 EF 56-60%,NO VALVE STENOSIS, STRESS 3/23 ISCHEMIC CHANGES, CARDS OV 11/17/23:CATH SINGLE VESSEL NONOBSTR. DISEASE..NO FURTHER CHEST PAIN, F/U 1 YR. CARDS CLEARANCE. KT     Benzos have opposite effect                     Anesthesia Plan    ASA 3     general     (Patient understands anesthesia not responsible for dental damage.)  intravenous induction     Anesthetic plan, risks, benefits, and alternatives have been provided, discussed and informed consent has been obtained with: patient.    Use of blood products discussed with patient .    Plan discussed with CRNA.        CODE STATUS:

## 2024-01-12 NOTE — DISCHARGE INSTRUCTIONS
DISCHARGE INSTRUCTIONS  DISCECTOMY/ LAMINECTOMY  [x] MINIMALLY INVASIVE      For your surgery you had:  General anesthesia (you may have a sore throat for the first 24 hours)    You may experience dizziness, drowsiness, or light-headedness for several hours following surgery  Do not stay alone today or tonight.  Limit your activity for 24 hours.  You should not drive, operate machinery, drink alcohol, or sign legally binding documents for 24 hours or while you are taking pain medication.  Activity  For the first two weeks following surgery, remain close to home and do not do any lifting, bending or strenuous activity.  Walking is the best exercise and you can increase the amount you walk as you feel like it.  Riding in a car for short distances (15-20 minutes) is okay but do not drive until you are off all narcotic medications.  If you have a sedentary job, you may resume work as soon as you feel like it (this is rarely less than one week).  Pain Control  Take your pain medicines as needed and as prescribed.  As you are feeling better, you can decrease the prescribed pain medication and take over-the-counter medications such as Tylenol or Ibuprofen.  The prescribed pain medicines tend to be constipating so it is important to eat a well-balanced diet and take a stool softener if recommended by the doctor.  Activity such as walking also helps keep your bowels regular.    Last dose of pain medication was given at:      Incision Care  Your sutures are under the skin and will dissolve in time.  You may shower tomorrow, no submerging of the incision for 2 weeks.      [x] You have a clear dressing, which you should remove in 3-4 days.  Beneath this dressing are steri-strips that will peel off over time.  If these steri-strips have not peeled off in 10-14 days, you may remove them.    Gently washing your incision with mild soap and rinsing with water during your shower is all you need to do to care for the incision.   Do not scrub the incision or sit in the bathtub.  Check your incision site each day to see how it looks.  Some redness and bruising is normal for the first few days.    NOTIFY YOUR DOCTOR IF YOU EXPERIENCE ANY OF THE FOLLOWING:   You have a fever over 100.8o Fahrenheit orally  Shaking chills  Your incision is red, hot to touch, or has excessive drainage  Your incision starts to separate  Increase in bleeding or bleeding that is excessive  Nausea, vomiting and/or pain not controlled by prescribed medications  Unable to urinate in 6 hours after surgery  You may contact 's clinic at [phone] with any questions or concerns.  If unable to reach your doctor, please go to the closest emergency room.    SPECIAL INSTRUCTIONS:  1) No driving for 5-7 days and not taking narcotics.   2) May shower tomorrow no submerging incision.   3) Do not lift / push / pull more than 8-10 lbs.   4) Minimize bending/twisting at the waist and jarring activity such as lawnmower.

## 2024-01-12 NOTE — H&P
[facility]   HISTORY AND PHYSICAL    Patient Name: Veda Peña  : 1955  MRN: [id number]  Primary Care Physician:  Lina Nguyen PA  Date of admission: 2024    Subjective   Subjective     Chief Complaint: Back and left leg pain    History of Present Illness  68-year-old female with back and left greater than right leg pain.  Her MRI demonstrated evidence of spinal stenosis at lumbar 3-lumbar 4.  This seems to correlate with her symptoms of leg pain and she opted for decompression after failing conservative interventions.      Review of Systems   Musculoskeletal:  Positive for back pain (Left greater than right leg pain).        Personal History     Past Medical History:   Diagnosis Date    Anemia     Anxiety     Arthritis     Cancer -present    Skin    Cataract     Cataract surgery in both eyes    Cholelithiasis     Surgery    CKD (chronic kidney disease)     FOLLOWED BY PCP LAST BUN 16, CREAT 1 AND GFR 61.2023    Colon polyp     Colonoscopy, removed and biopsied    COPD (chronic obstructive pulmonary disease)     Coronary artery disease     FOLLOWED BY DR WELSH . DENIES CP BUT HAS SOA WITH EXERTION CHRONIC ISSUE. DECREASED ACTIVITY D/T BACK PAIN AND SOA    Depression     Diabetes mellitus     Pre- diabetes    Elevated glucose 2021    Fibromyalgia, primary     ?    GERD (gastroesophageal reflux disease)     Headache     History of 2019 novel coronavirus disease (COVID-19) 2021    HL (hearing loss)     Hearing aids    Hyperlipidemia     Hypothyroidism     Irritable bowel syndrome     Constipation..ongoing    Low back pain     Lower back, across hips, and down both legs    Neuropathic pain     Neuropathy     Obesity     Osteopenia     Renal insufficiency     Shortness of breath on exertion        Past Surgical History:   Procedure Laterality Date    CARDIAC CATHETERIZATION N/A 2023    Procedure: Left Heart Cath with possible  angioplasty;  Surgeon: Hema English MD;  Location: [facility] INVASIVE LOCATION;  Service: Cardiovascular;  Laterality: N/A;    CHOLECYSTECTOMY      EYE SURGERY      cataract surgery of both eyes     HYSTERECTOMY      KNEE ACL RECONSTRUCTION Right     TUBAL ABDOMINAL LIGATION         Family History: family history includes Anxiety disorder in her daughter; Arthritis in her daughter, daughter, father, maternal grandmother, and mother; COPD in her daughter, daughter, and mother; Cancer in her brother, father, maternal grandmother, mother, paternal grandmother, and sister; Diabetes in her daughter, daughter, maternal grandmother, and mother; Early death in her daughter; Heart disease in her mother; Hypertension in her father, maternal aunt, maternal grandmother, and mother; Learning disabilities in her son; Thyroid disease in her daughter and daughter; Vision loss in her daughter and daughter. Otherwise pertinent FHx was reviewed and not pertinent to current issue.    Social History:  reports that she quit smoking about 10 years ago. Her smoking use included cigarettes and electronic cigarette. She has a 80.00 pack-year smoking history. She has never used smokeless tobacco. She reports that she does not drink alcohol and does not use drugs.    Home Medications:  HYDROcodone-acetaminophen, Senna-Natural Laxatives, Vitamin C, Vitamin D3, albuterol sulfate HFA, aspirin, budesonide-formoterol, cetirizine, gabapentin, levothyroxine sodium, multivitamin with minerals, omeprazole, oxybutynin XL, and simvastatin    Allergies:  Allergies   Allergen Reactions    Tramadol Nausea And Vomiting    Codeine Palpitations    Diazepam Anxiety    Iron Other (See Comments)     REPORTS CAUSES HER TO HAVE URINARY TRACT INFECTION    Nickel Hives, Swelling and Rash    Prednisone Unknown - Low Severity     REPORTS MAKES HER REALLY ANGRY AND MAD    Venlafaxine Nausea And Vomiting       Objective    Objective     Vitals:   Temp:  [96.7 °F  (35.9 °C)] 96.7 °F (35.9 °C)  Heart Rate:  [75] 75  Resp:  [18] 18  BP: (144)/(55) 144/55    Physical Exam  Constitutional:       Comments: BMI 34   Cardiovascular:      Comments: No notable lower extremity edema  Pulmonary:      Effort: Pulmonary effort is normal.   Skin:     General: Skin is warm and dry.   Neurological:      Mental Status: She is alert.   Psychiatric:         Mood and Affect: Mood normal.         Assessment & Plan   Assessment / Plan     Brief Patient Summary:  Veda Peña is a 68 y.o. female who has lumbar 3-lumbar 4 stenosis with left greater than right leg pain    Active Hospital Problems:  Active Hospital Problems    Diagnosis     **Spinal stenosis of lumbar region with radiculopathy      Plan:   OR today for minimally invasive laminectomy, left approach, lumbar 3-lumbar 4.  Risk and benefits discussed with patient.  Desires to proceed.    DVT prophylaxis:  Mechanical DVT prophylaxis orders are present.    CODE STATUS:       Admission Status:  I believe this patient meets outpatient status.    Dirk Vizcaino MD

## 2024-02-01 ENCOUNTER — OFFICE VISIT (OUTPATIENT)
Dept: NEUROSURGERY | Facility: CLINIC | Age: 69
End: 2024-02-01
Payer: MEDICARE

## 2024-02-01 DIAGNOSIS — Z98.890 S/P LUMBAR LAMINECTOMY: Primary | ICD-10-CM

## 2024-02-01 RX ORDER — HYDROCODONE BITARTRATE AND ACETAMINOPHEN 10; 325 MG/1; MG/1
1 TABLET ORAL EVERY 6 HOURS PRN
COMMUNITY

## 2024-02-01 NOTE — PROGRESS NOTES
Veda Peña is a 68 y.o. female that presents with Post-op       She is feeling better. Her walking and standing is improved. She is overall.         Physical Exam  Skin:     Comments: WHSS   Neurological:      Mental Status: She is alert.      Sensory: No sensory deficit.      Motor: No weakness.   Psychiatric:         Mood and Affect: Mood normal.             Assessment and Plan {CC Problem List  Visit Diagnosis  ROS  Review (Popup)  Health Maintenance  Quality  BestPractice  Medications  SmartSets  SnapShot Encounters  Media :23}   Problem List Items Addressed This Visit    None  Visit Diagnoses       S/P lumbar laminectomy    -  Primary        She will increase her activity as tolerated over the next few week and f/u with any worsening.     Follow Up {Instructions Charge Capture  Follow-up Communications :23}   No follow-ups on file.

## 2024-04-04 ENCOUNTER — HOSPITAL ENCOUNTER (OUTPATIENT)
Dept: GENERAL RADIOLOGY | Facility: HOSPITAL | Age: 69
Discharge: HOME | End: 2024-04-04
Payer: MEDICARE

## 2024-04-04 ENCOUNTER — OFFICE VISIT (OUTPATIENT)
Dept: FAMILY MEDICINE CLINIC | Facility: CLINIC | Age: 69
End: 2024-04-04
Payer: MEDICARE

## 2024-04-04 VITALS
WEIGHT: 172 LBS | TEMPERATURE: 97.8 F | HEART RATE: 58 BPM | OXYGEN SATURATION: 98 % | DIASTOLIC BLOOD PRESSURE: 45 MMHG | HEIGHT: 59 IN | BODY MASS INDEX: 34.68 KG/M2 | SYSTOLIC BLOOD PRESSURE: 130 MMHG

## 2024-04-04 DIAGNOSIS — J22 LOWER RESPIRATORY INFECTION (E.G., BRONCHITIS, PNEUMONIA, PNEUMONITIS, PULMONITIS): ICD-10-CM

## 2024-04-04 DIAGNOSIS — R35.0 URINARY FREQUENCY: ICD-10-CM

## 2024-04-04 DIAGNOSIS — R82.998 LEUKOCYTES IN URINE: ICD-10-CM

## 2024-04-04 DIAGNOSIS — R06.2 WHEEZING: ICD-10-CM

## 2024-04-04 DIAGNOSIS — J22 LOWER RESPIRATORY INFECTION (E.G., BRONCHITIS, PNEUMONIA, PNEUMONITIS, PULMONITIS): Primary | ICD-10-CM

## 2024-04-04 LAB
BILIRUB BLD-MCNC: NEGATIVE MG/DL
CLARITY, POC: CLEAR
COLOR UR: YELLOW
EXPIRATION DATE: ABNORMAL
EXPIRATION DATE: NORMAL
EXPIRATION DATE: NORMAL
FLUAV AG NPH QL: NEGATIVE
FLUBV AG NPH QL: NEGATIVE
GLUCOSE UR STRIP-MCNC: NEGATIVE MG/DL
INTERNAL CONTROL: NORMAL
INTERNAL CONTROL: NORMAL
KETONES UR QL: NEGATIVE
LEUKOCYTE EST, POC: ABNORMAL
Lab: ABNORMAL
Lab: NORMAL
Lab: NORMAL
NITRITE UR-MCNC: NEGATIVE MG/ML
PH UR: 6 [PH] (ref 5–8)
PROT UR STRIP-MCNC: NEGATIVE MG/DL
RBC # UR STRIP: NEGATIVE /UL
SARS-COV-2 AG UPPER RESP QL IA.RAPID: NORMAL
SP GR UR: 1.03 (ref 1–1.03)
UROBILINOGEN UR QL: NORMAL

## 2024-04-04 PROCEDURE — 87186 SC STD MICRODIL/AGAR DIL: CPT

## 2024-04-04 PROCEDURE — 71046 X-RAY EXAM CHEST 2 VIEWS: CPT

## 2024-04-04 PROCEDURE — 87086 URINE CULTURE/COLONY COUNT: CPT

## 2024-04-04 PROCEDURE — 87088 URINE BACTERIA CULTURE: CPT

## 2024-04-04 RX ORDER — AZITHROMYCIN 250 MG/1
TABLET, FILM COATED ORAL
Qty: 6 TABLET | Refills: 0 | Status: SHIPPED | OUTPATIENT
Start: 2024-04-04

## 2024-04-04 RX ORDER — IPRATROPIUM BROMIDE AND ALBUTEROL SULFATE 2.5; .5 MG/3ML; MG/3ML
3 SOLUTION RESPIRATORY (INHALATION)
Status: DISCONTINUED | OUTPATIENT
Start: 2024-04-04 | End: 2024-04-04

## 2024-04-04 RX ORDER — METHYLPREDNISOLONE 4 MG/1
TABLET ORAL
Qty: 21 TABLET | Refills: 0 | Status: SHIPPED | OUTPATIENT
Start: 2024-04-04

## 2024-04-04 RX ORDER — IPRATROPIUM BROMIDE AND ALBUTEROL SULFATE 2.5; .5 MG/3ML; MG/3ML
3 SOLUTION RESPIRATORY (INHALATION) EVERY 4 HOURS PRN
Qty: 360 ML | Refills: 0 | Status: SHIPPED | OUTPATIENT
Start: 2024-04-04

## 2024-04-04 NOTE — PROGRESS NOTES
Chief Complaint  Cough, Wheezing, and Urinary Frequency    SUBJECTIVE  Veda Peña presents to [facility]    History of Present Illness  Patient is a 68-year-old female presents today for acute sick visit.  She reports complaints of cough wheezing shortness of breath head pressure runny nose that started Monday night.  Patient reports she was recently on an airplane. She reports she has emphysema. Has been using her rescue inhaler more lately. She has noticed increased urinary urgency and frequency last night, reports hx of frequent UTI, denies any dysuria.   `  Past Medical History:   Diagnosis Date    Anemia 2021    Anxiety 2015    Arthritis     Asthma 2021    Emphysema    Cancer 2021-present    Skin    Cataract 2020    Cataract surgery in both eyes    Cholelithiasis     Surgery    CKD (chronic kidney disease)     FOLLOWED BY PCP LAST BUN 16, CREAT 1 AND GFR 61.5 NOV 2023    Colon polyp     Colonoscopy, removed and biopsied    COPD (chronic obstructive pulmonary disease)     Coronary artery disease     FOLLOWED BY DR WELSH . DENIES CP BUT HAS SOA WITH EXERTION CHRONIC ISSUE. DECREASED ACTIVITY D/T BACK PAIN AND SOA    Depression     Diabetes mellitus 2023    Pre- diabetes    Elevated glucose 04/14/2021    Fibromyalgia, primary     ?    GERD (gastroesophageal reflux disease)     Headache 2020    History of 2019 novel coronavirus disease (COVID-19) 04/14/2021    HL (hearing loss) 2021    Hearing aids    Hyperlipidemia     Hypothyroidism     Irritable bowel syndrome     Constipation..ongoing    Low back pain 2022    Lower back, across hips, and down both legs    Neuropathic pain     Neuropathy     Obesity     Osteopenia     Renal insufficiency     Shortness of breath on exertion       Family History   Problem Relation Age of Onset    Cancer Mother         Abdominal    Arthritis Mother     COPD Mother     Diabetes Mother     Heart disease Mother         Pacemaker,    Hypertension  Mother     Cancer Father         Throat, prostate,  metastatic    Arthritis Father     Hypertension Father     Cancer Sister         Kidney    Cancer Maternal Grandmother         Pancreatic    Arthritis Maternal Grandmother     Diabetes Maternal Grandmother     Hypertension Maternal Grandmother         Stroke    Unknown Other     Unknown Other     Unknown Other     Unknown Other     Unknown Other     Thyroid disease Daughter             Arthritis Daughter     COPD Daughter     Diabetes Daughter     Vision loss Daughter         Glaucoma    Anxiety disorder Daughter         Anxiety-stress    Vision loss Daughter         Has glaucoma    Early death Daughter         Thyroid    Thyroid disease Daughter             Cancer Paternal Grandmother         Leukemia- non hodgkin's lymphoma    Anxiety disorder Daughter         Anxiety    Arthritis Daughter     COPD Daughter     Diabetes Daughter     Cancer Brother         Non- hodgkin's lymphoma    Hypertension Maternal Aunt         Stroke    Learning disabilities Son         ADHD      Past Surgical History:   Procedure Laterality Date    CARDIAC CATHETERIZATION N/A 2023    Procedure: Left Heart Cath with possible angioplasty;  Surgeon: Hema English MD;  Location: [facility] CATH INVASIVE LOCATION;  Service: Cardiovascular;  Laterality: N/A;    CHOLECYSTECTOMY      EYE SURGERY      cataract surgery of both eyes     HYSTERECTOMY      KNEE ACL RECONSTRUCTION Right     LUMBAR LAMINECTOMY Left 2024    Procedure: MINIMALLY INVASIVE LUMBAR LAMINECTOMY, left approach, lumbar 3-lumbar 4;  Surgeon: Dirk Vizcaino MD;  Location: [facility] MAIN OR;  Service: Neurosurgery;  Laterality: Left;    TUBAL ABDOMINAL LIGATION          Current Outpatient Medications:     albuterol sulfate  (90 Base) MCG/ACT inhaler, Inhale 2 puffs Every 6 (Six) Hours As Needed for Wheezing., Disp: 18 g, Rfl: 1    Ascorbic Acid (Vitamin C) 500 MG capsule, Take 1 capsule by mouth  Daily., Disp: , Rfl:     aspirin 81 MG EC tablet, Take 1 tablet by mouth Daily. (Patient taking differently: Take 1 tablet by mouth Daily. REPORTS LAST DOSE OF ASA WAS 1/5/24 PER DR WELSH INSTRUCTIONS), Disp: 90 tablet, Rfl: 3    budesonide-formoterol (Symbicort) 80-4.5 MCG/ACT inhaler, Inhale 2 puffs 2 (Two) Times a Day., Disp: 10.2 g, Rfl: 2    cetirizine (zyrTEC) 10 MG tablet, Take 1 tablet by mouth Daily As Needed for Allergies., Disp: , Rfl:     Cholecalciferol (Vitamin D3) 75 MCG (3000 UT) tablet, Take 1 tablet by mouth Every Other Day., Disp: , Rfl:     gabapentin (NEURONTIN) 300 MG capsule, Take 1 capsule by mouth 2 (Two) Times a Day As Needed., Disp: , Rfl:     HYDROcodone-acetaminophen (NORCO)  MG per tablet, Take 1 tablet by mouth Every 6 (Six) Hours As Needed for Moderate Pain., Disp: , Rfl:     levothyroxine sodium (TIROSINT) 50 MCG capsule, Take 1 capsule by mouth Daily., Disp: 90 capsule, Rfl: 1    multivitamin with minerals tablet tablet, Take 1 tablet by mouth Daily., Disp: , Rfl:     omeprazole (priLOSEC) 20 MG capsule, Take 1 capsule by mouth Daily., Disp: 90 capsule, Rfl: 1    oxybutynin XL (DITROPAN-XL) 10 MG 24 hr tablet, Take 1 tablet by mouth 2 (Two) Times a Day., Disp: 180 tablet, Rfl: 1    Senna-Natural Laxatives (SENOKOT LAXATIVE PO), Take 1 tablet by mouth Every Night., Disp: , Rfl:     simvastatin (ZOCOR) 20 MG tablet, Take 1 tablet by mouth Every Night., Disp: 90 tablet, Rfl: 1    azithromycin (Zithromax Z-Abdoul) 250 MG tablet, Take 2 tablets by mouth on day 1, then 1 tablet daily on days 2-5, Disp: 6 tablet, Rfl: 0    ipratropium-albuterol (DUO-NEB) 0.5-2.5 mg/3 ml nebulizer, Take 3 mL by nebulization Every 4 (Four) Hours As Needed for Wheezing or Shortness of Air., Disp: 360 mL, Rfl: 0    methylPREDNISolone (MEDROL) 4 MG dose pack, Take as directed on package instructions., Disp: 21 tablet, Rfl: 0  No current facility-administered medications for this visit.    OBJECTIVE  Vital  "Signs:   /45   Pulse 58   Temp 97.8 °F (36.6 °C) (Oral)   Ht 149.9 cm (59\")   Wt 78 kg (172 lb)   SpO2 98%   BMI 34.74 kg/m²    Estimated body mass index is 34.74 kg/m² as calculated from the following:    Height as of this encounter: 149.9 cm (59\").    Weight as of this encounter: 78 kg (172 lb).     Wt Readings from Last 3 Encounters:   04/04/24 78 kg (172 lb)   01/12/24 76.8 kg (169 lb 5 oz)   12/15/23 77.7 kg (171 lb 6.4 oz)     BP Readings from Last 3 Encounters:   04/04/24 130/45   01/12/24 134/52   12/15/23 140/67       Physical Exam  Vitals reviewed.   Constitutional:       General: She is not in acute distress.     Appearance: She is ill-appearing.   HENT:      Head: Normocephalic and atraumatic.      Right Ear: Tympanic membrane, ear canal and external ear normal.      Left Ear: Tympanic membrane, ear canal and external ear normal.      Nose: Rhinorrhea present.      Mouth/Throat:      Mouth: Mucous membranes are moist.      Pharynx: Oropharynx is clear. No oropharyngeal exudate or posterior oropharyngeal erythema.   Eyes:      Conjunctiva/sclera: Conjunctivae normal.   Cardiovascular:      Rate and Rhythm: Normal rate and regular rhythm.      Heart sounds: Normal heart sounds.   Pulmonary:      Effort: Tachypnea present. No respiratory distress.      Breath sounds: Wheezing present.      Comments: Barking dry cough  Musculoskeletal:      Cervical back: Normal range of motion.   Neurological:      Mental Status: She is alert.          Result Review    Common labs          5/24/2023    11:52 11/16/2023    10:33 1/12/2024    07:00   Common Labs   Glucose 104  120  109    BUN 19  16  20    Creatinine 1.12  1.00  1.11    Sodium 138  136  139    Potassium 4.7  4.6  4.5    Chloride 104  98  101    Calcium 10.2  9.8  9.7    Albumin 4.4  4.4     Total Bilirubin 0.5  0.3     Alkaline Phosphatase 72  89     AST (SGOT) 23  20     ALT (SGPT) 22  14     WBC 8.00  8.03     Hemoglobin 12.1  12.6   "   Hematocrit 38.9  41.3     Platelets 283  289     Total Cholesterol 146  141     Triglycerides 83  93     HDL Cholesterol 50  49     LDL Cholesterol  80  75     Hemoglobin A1C 6.30  6.30         CT Chest Low Dose Cancer Screening WO    Result Date: 1/2/2024    1. No evidence of lung cancer. 2. No acute cardiopulmonary process.  LUNG RADS:                           1 (negative, less than 1% chance of malignancy)     HERIBERTO NOE MD       Electronically Signed and Approved By: HERIBETRO NOE MD on 1/02/2024 at 11:30                 The above data has been reviewed by ANA Casanova 04/04/2024 13:36 EDT.          Patient Care Team:  Lina Nguyen PA as PCP - General (Family Medicine)            ASSESSMENT & PLAN    Diagnoses and all orders for this visit:    1. Lower respiratory infection (e.g., bronchitis, pneumonia, pneumonitis, pulmonitis) (Primary)  Comments:  start medrol, z-abdoul, complete chest xray  Orders:  -     methylPREDNISolone (MEDROL) 4 MG dose pack; Take as directed on package instructions.  Dispense: 21 tablet; Refill: 0  -     azithromycin (Zithromax Z-Abdoul) 250 MG tablet; Take 2 tablets by mouth on day 1, then 1 tablet daily on days 2-5  Dispense: 6 tablet; Refill: 0  -     XR Chest 2 View; Future  -     Home Nebulizer  -     ipratropium-albuterol (DUO-NEB) 0.5-2.5 mg/3 ml nebulizer; Take 3 mL by nebulization Every 4 (Four) Hours As Needed for Wheezing or Shortness of Air.  Dispense: 360 mL; Refill: 0    2. Urinary frequency  Comments:  UA shows leukocytes, otherwise normal  Orders:  -     POCT urinalysis dipstick, automated  -     Urine Culture - Urine, Urine, Clean Catch; Future  -     Urine Culture - Urine, Urine, Clean Catch    3. Leukocytes in urine  Comments:  will send for culture due to hx of recurrent UTI  Orders:  -     Urine Culture - Urine, Urine, Clean Catch; Future  -     Urine Culture - Urine, Urine, Clean Catch    4. Wheezing  Comments:  start using nebulizer every4  hrs until wheezing and SOA improve  Orders:  -     Discontinue: ipratropium-albuterol (DUO-NEB) nebulizer solution 3 mL  -     Home Nebulizer  -     ipratropium-albuterol (DUO-NEB) 0.5-2.5 mg/3 ml nebulizer; Take 3 mL by nebulization Every 4 (Four) Hours As Needed for Wheezing or Shortness of Air.  Dispense: 360 mL; Refill: 0         Tobacco Use: Medium Risk (4/4/2024)    Patient History     Smoking Tobacco Use: Former     Smokeless Tobacco Use: Never     Passive Exposure: Not on file       Follow Up     Return if symptoms worsen or fail to improve.      Patient was given instructions and counseling regarding her condition or for health maintenance advice. Please see specific information pulled into the AVS if appropriate.   I have reviewed information obtained and documented by others and I have confirmed the accuracy of this documented note.    ANA Casanova

## 2024-04-05 DIAGNOSIS — N30.00 ACUTE CYSTITIS WITHOUT HEMATURIA: Primary | ICD-10-CM

## 2024-04-05 RX ORDER — NITROFURANTOIN 25; 75 MG/1; MG/1
100 CAPSULE ORAL 2 TIMES DAILY
Qty: 14 CAPSULE | Refills: 0 | Status: SHIPPED | OUTPATIENT
Start: 2024-04-05

## 2024-04-06 LAB — BACTERIA SPEC AEROBE CULT: ABNORMAL

## 2024-04-28 ENCOUNTER — HOSPITAL ENCOUNTER (INPATIENT)
Facility: HOSPITAL | Age: 69
LOS: 2 days | Discharge: HOME | End: 2024-04-30
Attending: EMERGENCY MEDICINE | Admitting: FAMILY MEDICINE
Payer: MEDICARE

## 2024-04-28 ENCOUNTER — APPOINTMENT (OUTPATIENT)
Dept: GENERAL RADIOLOGY | Facility: HOSPITAL | Age: 69
End: 2024-04-28
Payer: MEDICARE

## 2024-04-28 DIAGNOSIS — J44.0 COPD (CHRONIC OBSTRUCTIVE PULMONARY DISEASE) WITH ACUTE BRONCHITIS: ICD-10-CM

## 2024-04-28 DIAGNOSIS — J96.01 ACUTE RESPIRATORY FAILURE WITH HYPOXIA: ICD-10-CM

## 2024-04-28 DIAGNOSIS — J20.9 COPD (CHRONIC OBSTRUCTIVE PULMONARY DISEASE) WITH ACUTE BRONCHITIS: ICD-10-CM

## 2024-04-28 DIAGNOSIS — R16.1 SPLENOMEGALY: ICD-10-CM

## 2024-04-28 DIAGNOSIS — B34.9 VIRAL ILLNESS: Primary | ICD-10-CM

## 2024-04-28 PROBLEM — J96.10 CHRONIC RESPIRATORY FAILURE: Status: ACTIVE | Noted: 2024-04-28

## 2024-04-28 LAB
ALBUMIN SERPL-MCNC: 4 G/DL (ref 3.5–5.2)
ALBUMIN/GLOB SERPL: 1.1 G/DL
ALP SERPL-CCNC: 131 U/L (ref 39–117)
ALT SERPL W P-5'-P-CCNC: 92 U/L (ref 1–33)
ANION GAP SERPL CALCULATED.3IONS-SCNC: 13.3 MMOL/L (ref 5–15)
AST SERPL-CCNC: 118 U/L (ref 1–32)
BASOPHILS # BLD MANUAL: 0.03 10*3/MM3 (ref 0–0.2)
BASOPHILS NFR BLD MANUAL: 1 % (ref 0–1.5)
BILIRUB SERPL-MCNC: 0.6 MG/DL (ref 0–1.2)
BUN SERPL-MCNC: 19 MG/DL (ref 8–23)
BUN/CREAT SERPL: 14.7 (ref 7–25)
CALCIUM SPEC-SCNC: 8.9 MG/DL (ref 8.6–10.5)
CHLORIDE SERPL-SCNC: 99 MMOL/L (ref 98–107)
CO2 SERPL-SCNC: 22.7 MMOL/L (ref 22–29)
CREAT SERPL-MCNC: 1.29 MG/DL (ref 0.57–1)
D-LACTATE SERPL-SCNC: 0.8 MMOL/L (ref 0.5–2)
DEPRECATED RDW RBC AUTO: 46.3 FL (ref 37–54)
EGFRCR SERPLBLD CKD-EPI 2021: 45.3 ML/MIN/1.73
ERYTHROCYTE [DISTWIDTH] IN BLOOD BY AUTOMATED COUNT: 16.5 % (ref 12.3–15.4)
FLUAV SUBTYP SPEC NAA+PROBE: NOT DETECTED
FLUBV RNA ISLT QL NAA+PROBE: NOT DETECTED
GLOBULIN UR ELPH-MCNC: 3.7 GM/DL
GLUCOSE SERPL-MCNC: 101 MG/DL (ref 65–99)
HCT VFR BLD AUTO: 36.4 % (ref 34–46.6)
HGB BLD-MCNC: 11.3 G/DL (ref 12–15.9)
HOLD SPECIMEN: NORMAL
HOLD SPECIMEN: NORMAL
LARGE PLATELETS: NORMAL
LYMPHOCYTES # BLD MANUAL: 0.94 10*3/MM3 (ref 0.7–3.1)
LYMPHOCYTES NFR BLD MANUAL: 4 % (ref 5–12)
MCH RBC QN AUTO: 24 PG (ref 26.6–33)
MCHC RBC AUTO-ENTMCNC: 31 G/DL (ref 31.5–35.7)
MCV RBC AUTO: 77.3 FL (ref 79–97)
METAMYELOCYTES NFR BLD MANUAL: 5 % (ref 0–0)
MONOCYTES # BLD: 0.1 10*3/MM3 (ref 0.1–0.9)
NEUTROPHILS # BLD AUTO: 1.34 10*3/MM3 (ref 1.7–7)
NEUTROPHILS NFR BLD MANUAL: 45 % (ref 42.7–76)
NEUTS BAND NFR BLD MANUAL: 8 % (ref 0–5)
NEUTS VAC BLD QL SMEAR: NORMAL
NT-PROBNP SERPL-MCNC: 262.5 PG/ML (ref 0–900)
PLAT MORPH BLD: NORMAL
PLATELET # BLD AUTO: 117 10*3/MM3 (ref 140–450)
PMV BLD AUTO: 11.2 FL (ref 6–12)
POTASSIUM SERPL-SCNC: 4.3 MMOL/L (ref 3.5–5.2)
PROT SERPL-MCNC: 7.7 G/DL (ref 6–8.5)
RBC # BLD AUTO: 4.71 10*6/MM3 (ref 3.77–5.28)
RBC MORPH BLD: NORMAL
RSV RNA NPH QL NAA+NON-PROBE: NOT DETECTED
SARS-COV-2 RNA RESP QL NAA+PROBE: NOT DETECTED
SCAN SLIDE: NORMAL
SMALL PLATELETS BLD QL SMEAR: NORMAL
SMUDGE CELLS BLD QL SMEAR: NORMAL
SODIUM SERPL-SCNC: 135 MMOL/L (ref 136–145)
TROPONIN T SERPL HS-MCNC: 10 NG/L
VARIANT LYMPHS NFR BLD MANUAL: 12 % (ref 0–5)
VARIANT LYMPHS NFR BLD MANUAL: 25 % (ref 19.6–45.3)
WBC MORPH BLD: NORMAL
WBC NRBC COR # BLD AUTO: 2.53 10*3/MM3 (ref 3.4–10.8)
WHOLE BLOOD HOLD COAG: NORMAL
WHOLE BLOOD HOLD SPECIMEN: NORMAL

## 2024-04-28 PROCEDURE — 84484 ASSAY OF TROPONIN QUANT: CPT | Performed by: EMERGENCY MEDICINE

## 2024-04-28 PROCEDURE — 25010000002 METHYLPREDNISOLONE PER 125 MG: Performed by: EMERGENCY MEDICINE

## 2024-04-28 PROCEDURE — 80053 COMPREHEN METABOLIC PANEL: CPT | Performed by: EMERGENCY MEDICINE

## 2024-04-28 PROCEDURE — 25810000003 SODIUM CHLORIDE 0.9 % SOLUTION: Performed by: EMERGENCY MEDICINE

## 2024-04-28 PROCEDURE — 25010000002 AZITHROMYCIN PER 500 MG: Performed by: EMERGENCY MEDICINE

## 2024-04-28 PROCEDURE — 99285 EMERGENCY DEPT VISIT HI MDM: CPT

## 2024-04-28 PROCEDURE — 93010 ELECTROCARDIOGRAM REPORT: CPT | Performed by: INTERNAL MEDICINE

## 2024-04-28 PROCEDURE — 36415 COLL VENOUS BLD VENIPUNCTURE: CPT

## 2024-04-28 PROCEDURE — 25010000002 ONDANSETRON PER 1 MG: Performed by: EMERGENCY MEDICINE

## 2024-04-28 PROCEDURE — 94760 N-INVAS EAR/PLS OXIMETRY 1: CPT

## 2024-04-28 PROCEDURE — 94799 UNLISTED PULMONARY SVC/PX: CPT

## 2024-04-28 PROCEDURE — 93005 ELECTROCARDIOGRAM TRACING: CPT | Performed by: EMERGENCY MEDICINE

## 2024-04-28 PROCEDURE — 85025 COMPLETE CBC W/AUTO DIFF WBC: CPT | Performed by: EMERGENCY MEDICINE

## 2024-04-28 PROCEDURE — 99222 1ST HOSP IP/OBS MODERATE 55: CPT | Performed by: FAMILY MEDICINE

## 2024-04-28 PROCEDURE — 83605 ASSAY OF LACTIC ACID: CPT | Performed by: EMERGENCY MEDICINE

## 2024-04-28 PROCEDURE — 87637 SARSCOV2&INF A&B&RSV AMP PRB: CPT | Performed by: EMERGENCY MEDICINE

## 2024-04-28 PROCEDURE — 83880 ASSAY OF NATRIURETIC PEPTIDE: CPT | Performed by: EMERGENCY MEDICINE

## 2024-04-28 PROCEDURE — 87040 BLOOD CULTURE FOR BACTERIA: CPT | Performed by: EMERGENCY MEDICINE

## 2024-04-28 PROCEDURE — 71045 X-RAY EXAM CHEST 1 VIEW: CPT

## 2024-04-28 PROCEDURE — 85007 BL SMEAR W/DIFF WBC COUNT: CPT | Performed by: EMERGENCY MEDICINE

## 2024-04-28 PROCEDURE — 94640 AIRWAY INHALATION TREATMENT: CPT

## 2024-04-28 RX ORDER — METHYLPREDNISOLONE SODIUM SUCCINATE 125 MG/2ML
125 INJECTION, POWDER, LYOPHILIZED, FOR SOLUTION INTRAMUSCULAR; INTRAVENOUS ONCE
Status: COMPLETED | OUTPATIENT
Start: 2024-04-28 | End: 2024-04-28

## 2024-04-28 RX ORDER — SODIUM CHLORIDE 9 MG/ML
100 INJECTION, SOLUTION INTRAVENOUS CONTINUOUS
Status: DISCONTINUED | OUTPATIENT
Start: 2024-04-29 | End: 2024-04-30 | Stop reason: HOSPADM

## 2024-04-28 RX ORDER — IPRATROPIUM BROMIDE AND ALBUTEROL SULFATE 2.5; .5 MG/3ML; MG/3ML
3 SOLUTION RESPIRATORY (INHALATION) EVERY 4 HOURS PRN
Status: DISCONTINUED | OUTPATIENT
Start: 2024-04-28 | End: 2024-04-29 | Stop reason: SDUPTHER

## 2024-04-28 RX ORDER — SODIUM CHLORIDE 9 MG/ML
40 INJECTION, SOLUTION INTRAVENOUS AS NEEDED
Status: DISCONTINUED | OUTPATIENT
Start: 2024-04-28 | End: 2024-04-30 | Stop reason: HOSPADM

## 2024-04-28 RX ORDER — SODIUM CHLORIDE 0.9 % (FLUSH) 0.9 %
10 SYRINGE (ML) INJECTION AS NEEDED
Status: DISCONTINUED | OUTPATIENT
Start: 2024-04-28 | End: 2024-04-30 | Stop reason: HOSPADM

## 2024-04-28 RX ORDER — BISACODYL 5 MG/1
5 TABLET, DELAYED RELEASE ORAL DAILY PRN
Status: DISCONTINUED | OUTPATIENT
Start: 2024-04-28 | End: 2024-04-30 | Stop reason: HOSPADM

## 2024-04-28 RX ORDER — POLYETHYLENE GLYCOL 3350 17 G/17G
17 POWDER, FOR SOLUTION ORAL DAILY PRN
Status: DISCONTINUED | OUTPATIENT
Start: 2024-04-28 | End: 2024-04-30 | Stop reason: HOSPADM

## 2024-04-28 RX ORDER — IPRATROPIUM BROMIDE AND ALBUTEROL SULFATE 2.5; .5 MG/3ML; MG/3ML
3 SOLUTION RESPIRATORY (INHALATION) ONCE
Status: COMPLETED | OUTPATIENT
Start: 2024-04-28 | End: 2024-04-28

## 2024-04-28 RX ORDER — BISACODYL 10 MG
10 SUPPOSITORY, RECTAL RECTAL DAILY PRN
Status: DISCONTINUED | OUTPATIENT
Start: 2024-04-28 | End: 2024-04-30 | Stop reason: HOSPADM

## 2024-04-28 RX ORDER — NITROGLYCERIN 0.4 MG/1
0.4 TABLET SUBLINGUAL
Status: DISCONTINUED | OUTPATIENT
Start: 2024-04-28 | End: 2024-04-30 | Stop reason: HOSPADM

## 2024-04-28 RX ORDER — AMOXICILLIN 250 MG
2 CAPSULE ORAL 2 TIMES DAILY PRN
Status: DISCONTINUED | OUTPATIENT
Start: 2024-04-28 | End: 2024-04-30 | Stop reason: HOSPADM

## 2024-04-28 RX ORDER — ONDANSETRON 2 MG/ML
4 INJECTION INTRAMUSCULAR; INTRAVENOUS ONCE
Status: COMPLETED | OUTPATIENT
Start: 2024-04-28 | End: 2024-04-28

## 2024-04-28 RX ORDER — IPRATROPIUM BROMIDE AND ALBUTEROL SULFATE 2.5; .5 MG/3ML; MG/3ML
3 SOLUTION RESPIRATORY (INHALATION)
Status: DISCONTINUED | OUTPATIENT
Start: 2024-04-29 | End: 2024-04-30

## 2024-04-28 RX ORDER — METHYLPREDNISOLONE SODIUM SUCCINATE 125 MG/2ML
62.5 INJECTION, POWDER, LYOPHILIZED, FOR SOLUTION INTRAMUSCULAR; INTRAVENOUS DAILY
Status: DISCONTINUED | OUTPATIENT
Start: 2024-04-29 | End: 2024-04-30

## 2024-04-28 RX ORDER — AZITHROMYCIN 250 MG/1
500 TABLET, FILM COATED ORAL DAILY
Qty: 4 TABLET | Refills: 0 | Status: COMPLETED | OUTPATIENT
Start: 2024-04-29 | End: 2024-04-30

## 2024-04-28 RX ORDER — SODIUM CHLORIDE 0.9 % (FLUSH) 0.9 %
10 SYRINGE (ML) INJECTION EVERY 12 HOURS SCHEDULED
Status: DISCONTINUED | OUTPATIENT
Start: 2024-04-29 | End: 2024-04-30 | Stop reason: HOSPADM

## 2024-04-28 RX ORDER — AMOXICILLIN AND CLAVULANATE POTASSIUM 875; 125 MG/1; MG/1
1 TABLET, FILM COATED ORAL 2 TIMES DAILY
Status: DISCONTINUED | OUTPATIENT
Start: 2024-04-29 | End: 2024-04-29

## 2024-04-28 RX ORDER — HEPARIN SODIUM 5000 [USP'U]/ML
5000 INJECTION, SOLUTION INTRAVENOUS; SUBCUTANEOUS EVERY 8 HOURS SCHEDULED
Status: DISCONTINUED | OUTPATIENT
Start: 2024-04-29 | End: 2024-04-30 | Stop reason: HOSPADM

## 2024-04-28 RX ADMIN — METHYLPREDNISOLONE SODIUM SUCCINATE 125 MG: 125 INJECTION INTRAMUSCULAR; INTRAVENOUS at 20:13

## 2024-04-28 RX ADMIN — AZITHROMYCIN MONOHYDRATE 500 MG: 500 INJECTION, POWDER, LYOPHILIZED, FOR SOLUTION INTRAVENOUS at 21:16

## 2024-04-28 RX ADMIN — IPRATROPIUM BROMIDE AND ALBUTEROL SULFATE 3 ML: .5; 3 SOLUTION RESPIRATORY (INHALATION) at 20:17

## 2024-04-28 RX ADMIN — ONDANSETRON 4 MG: 2 INJECTION INTRAMUSCULAR; INTRAVENOUS at 21:12

## 2024-04-28 NOTE — ED PROVIDER NOTES
Time: 7:40 PM EDT  Date of encounter:  4/28/2024  Independent Historian/Clinical History and Information was obtained by:   Patient    History is limited by: N/A    Chief Complaint: Shortness of breath, cough      History of Present Illness:  Patient is a 68 y.o. year old female who presents to the emergency department for evaluation of shortness of breath and cough.  Patient has a history of CKD, COPD, diabetes who presents with complaints of shortness of breath, cough.  States that she has not felt well for couple weeks now.  States that she was recently treated for a COPD exacerbation placed on antibiotics as well as steroids.  She completed those.  States that she feels like she is not getting any better.  Today she reports a fever.  No other complaints this time.    HPI    Patient Care Team  Primary Care Provider: Lina Nguyen PA    Past Medical History:     Allergies   Allergen Reactions    Tramadol Nausea And Vomiting    Codeine Palpitations    Diazepam Anxiety    Iron Other (See Comments)     REPORTS CAUSES HER TO HAVE URINARY TRACT INFECTION    Nickel Hives, Swelling and Rash    Prednisone Unknown - Low Severity     REPORTS MAKES HER REALLY ANGRY AND MAD    Venlafaxine Nausea And Vomiting     Past Medical History:   Diagnosis Date    Anemia 2021    Anxiety 2015    Arthritis     Asthma 2021    Emphysema    Cancer 2021-present    Skin    Cataract 2020    Cataract surgery in both eyes    Cholelithiasis     Surgery    CKD (chronic kidney disease)     FOLLOWED BY PCP LAST BUN 16, CREAT 1 AND GFR 61.5 NOV 2023    Colon polyp     Colonoscopy, removed and biopsied    COPD (chronic obstructive pulmonary disease)     Coronary artery disease     FOLLOWED BY DR WELSH . DENIES CP BUT HAS SOA WITH EXERTION CHRONIC ISSUE. DECREASED ACTIVITY D/T BACK PAIN AND SOA    Depression     Diabetes mellitus 2023    Pre- diabetes    Elevated glucose 04/14/2021    Fibromyalgia, primary     ?    GERD (gastroesophageal  reflux disease)     Headache     History of 2019 novel coronavirus disease (COVID-19) 2021    HL (hearing loss)     Hearing aids    Hyperlipidemia     Hypothyroidism     Irritable bowel syndrome     Constipation..ongoing    Low back pain     Lower back, across hips, and down both legs    Neuropathic pain     Neuropathy     Obesity     Osteopenia     Renal insufficiency     Shortness of breath on exertion      Past Surgical History:   Procedure Laterality Date    CARDIAC CATHETERIZATION N/A 2023    Procedure: Left Heart Cath with possible angioplasty;  Surgeon: Hema English MD;  Location: [facility] CATH INVASIVE LOCATION;  Service: Cardiovascular;  Laterality: N/A;    CHOLECYSTECTOMY      EYE SURGERY      cataract surgery of both eyes     HYSTERECTOMY      KNEE ACL RECONSTRUCTION Right     LUMBAR LAMINECTOMY Left 2024    Procedure: MINIMALLY INVASIVE LUMBAR LAMINECTOMY, left approach, lumbar 3-lumbar 4;  Surgeon: Dirk Vizcaino MD;  Location: [facility] MAIN OR;  Service: Neurosurgery;  Laterality: Left;    TUBAL ABDOMINAL LIGATION       Family History   Problem Relation Age of Onset    Cancer Mother         Abdominal    Arthritis Mother     COPD Mother     Diabetes Mother     Heart disease Mother         Pacemaker,    Hypertension Mother     Cancer Father         Throat, prostate,  metastatic    Arthritis Father     Hypertension Father     Cancer Sister         Kidney    Cancer Maternal Grandmother         Pancreatic    Arthritis Maternal Grandmother     Diabetes Maternal Grandmother     Hypertension Maternal Grandmother         Stroke    Unknown Other     Unknown Other     Unknown Other     Unknown Other     Unknown Other     Thyroid disease Daughter             Arthritis Daughter     COPD Daughter     Diabetes Daughter     Vision loss Daughter         Glaucoma    Anxiety disorder Daughter         Anxiety-stress    Vision loss Daughter         Has glaucoma    Early death Daughter          Thyroid    Thyroid disease Daughter             Cancer Paternal Grandmother         Leukemia- non hodgkin's lymphoma    Anxiety disorder Daughter         Anxiety    Arthritis Daughter     COPD Daughter     Diabetes Daughter     Cancer Brother         Non- hodgkin's lymphoma    Hypertension Maternal Aunt         Stroke    Learning disabilities Son         ADHD       Home Medications:  Prior to Admission medications    Medication Sig Start Date End Date Taking? Authorizing Provider   albuterol sulfate  (90 Base) MCG/ACT inhaler Inhale 2 puffs Every 6 (Six) Hours As Needed for Wheezing. 23   Lina Nguyen PA   Ascorbic Acid (Vitamin C) 500 MG capsule Take 1 capsule by mouth Daily.    Clifford Hollis MD   aspirin 81 MG EC tablet Take 1 tablet by mouth Daily.  Patient taking differently: Take 1 tablet by mouth Daily. REPORTS LAST DOSE OF ASA WAS 24 PER DR ENGLISH INSTRUCTIONS 4/3/23   Hema English MD   azithromycin (Zithromax Z-Abdoul) 250 MG tablet Take 2 tablets by mouth on day 1, then 1 tablet daily on days 2-5 24   Saumya Barrett APRN   budesonide-formoterol (Symbicort) 80-4.5 MCG/ACT inhaler Inhale 2 puffs 2 (Two) Times a Day. 23   Lina Nguyen PA   cetirizine (zyrTEC) 10 MG tablet Take 1 tablet by mouth Daily As Needed for Allergies.    Clifford Hollis MD   Cholecalciferol (Vitamin D3) 75 MCG (3000 UT) tablet Take 1 tablet by mouth Every Other Day.    Clifford Hollis MD   gabapentin (NEURONTIN) 300 MG capsule Take 1 capsule by mouth 2 (Two) Times a Day As Needed. 21   Clifford Hollis MD   HYDROcodone-acetaminophen (NORCO)  MG per tablet Take 1 tablet by mouth Every 6 (Six) Hours As Needed for Moderate Pain.    Clifford Hollis MD   ipratropium-albuterol (DUO-NEB) 0.5-2.5 mg/3 ml nebulizer Take 3 mL by nebulization Every 4 (Four) Hours As Needed for Wheezing or Shortness of Air. 24   Saumya Barrett APRN  "  levothyroxine sodium (TIROSINT) 50 MCG capsule Take 1 capsule by mouth Daily. 23   Lina Nguyen PA   methylPREDNISolone (MEDROL) 4 MG dose pack Take as directed on package instructions. 24   Saumya Barrett APRN   multivitamin with minerals tablet tablet Take 1 tablet by mouth Daily.    Provider, MD Clifford   nitrofurantoin, macrocrystal-monohydrate, (Macrobid) 100 MG capsule Take 1 capsule by mouth 2 (Two) Times a Day. 24   Saumya Barrett APRN   omeprazole (priLOSEC) 20 MG capsule Take 1 capsule by mouth Daily. 23   Lina Nguyen PA   oxybutynin XL (DITROPAN-XL) 10 MG 24 hr tablet Take 1 tablet by mouth 2 (Two) Times a Day. 23   Lina Nguyen PA   Senna-Natural Laxatives (SENOKOT LAXATIVE PO) Take 1 tablet by mouth Every Night.    Provider, MD Clifford   simvastatin (ZOCOR) 20 MG tablet Take 1 tablet by mouth Every Night. 23   Lina Nguyen PA        Social History:   Social History     Tobacco Use    Smoking status: Former     Current packs/day: 0.00     Average packs/day: 2.0 packs/day for 40.0 years (80.0 ttl pk-yrs)     Types: Cigarettes, Electronic Cigarette     Start date: 3/26/1973     Quit date: 3/26/2013     Years since quittin.0    Smokeless tobacco: Never    Tobacco comments:     Stopped electronic cigarettes on 2022   Vaping Use    Vaping status: Former    Substances: Flavoring    Devices: Pre-filled or refillable cartridge, Refillable tank   Substance Use Topics    Alcohol use: Never    Drug use: Never         Review of Systems:  Review of Systems   Constitutional:  Positive for fever.   Respiratory:  Positive for cough and shortness of breath.         Physical Exam:  /61   Pulse 87   Temp (!) 102.9 °F (39.4 °C) (Oral)   Resp 22   Ht 149.9 cm (59\")   Wt 78 kg (171 lb 15.3 oz)   SpO2 91%   BMI 34.73 kg/m²     Physical Exam  Vitals and nursing note reviewed.   Constitutional:       Appearance: " Normal appearance.   HENT:      Head: Normocephalic and atraumatic.   Eyes:      General: No scleral icterus.  Cardiovascular:      Rate and Rhythm: Normal rate and regular rhythm.      Heart sounds: Normal heart sounds.   Pulmonary:      Effort: Pulmonary effort is normal.      Breath sounds: Normal breath sounds.   Abdominal:      Palpations: Abdomen is soft.      Tenderness: There is no abdominal tenderness.   Musculoskeletal:         General: Normal range of motion.      Cervical back: Normal range of motion.   Skin:     Findings: No rash.   Neurological:      General: No focal deficit present.      Mental Status: She is alert.                  Procedures:  Procedures      Medical Decision Making:      Comorbidities that affect care:    COPD, Diabetes    External Notes reviewed:    Reviewed PCP note from 4/4/2024      The following orders were placed and all results were independently analyzed by me:  Orders Placed This Encounter   Procedures    Blood Culture - Blood,    Blood Culture - Blood,    COVID-19, FLU A/B, RSV PCR 1 HR TAT - Swab, Nasopharynx    XR Chest 1 View    [place] Draw    Comprehensive Metabolic Panel    BNP    Single High Sensitivity Troponin T    CBC Auto Differential    Lactic Acid, Plasma    Scan Slide    Manual Differential    NPO Diet NPO Type: Strict NPO    Undress & Gown    Continuous Pulse Oximetry    Vital Signs    Inpatient Hospitalist Consult    Oxygen Therapy- Nasal Cannula; Titrate 1-6 LPM Per SpO2; 90 - 95%    ECG 12 Lead ED Triage Standing Order; SOA    Insert Peripheral IV    CBC & Differential    Green Top (Gel)    Lavender Top    Gold Top - SST    Light Blue Top       Medications Given in the Emergency Department:  Medications   sodium chloride 0.9 % flush 10 mL (has no administration in time range)   ondansetron (ZOFRAN) injection 4 mg (has no administration in time range)   AZITHROMYCIN 500 MG/250 ML 0.9% NS IVPB (vial-mate) (has no administration in time range)    methylPREDNISolone sodium succinate (SOLU-Medrol) injection 125 mg (125 mg Intravenous Given 4/28/24 2013)   ipratropium-albuterol (DUO-NEB) nebulizer solution 3 mL (3 mL Nebulization Given 4/28/24 2017)        ED Course:    ED Course as of 04/28/24 2117   Sun Apr 28, 2024 1939 EKG interpreted by me  Time: 1937  Heart rate: 84  Sinus, T wave inversions in the lateral leads, nonspecific ST changes [MA]   2054 Recheck patient.  Appears to have a viral illness with a low white count and fever.  X-ray does not show pneumonia on exam.  Does have a history of COPD.  Will do a walking O2 sat and reassess.  She is otherwise well-appearing with no acute respiratory distress at this time. [MA]   2117 Spoke with Dr. Sandoval who agrees to admit [MA]      ED Course User Index  [MA] Elder Garcia MD       Labs:    Lab Results (last 24 hours)       Procedure Component Value Units Date/Time    CBC & Differential [[id number]]  (Abnormal) Collected: 04/28/24 1932    Specimen: Blood Updated: 04/28/24 2014    Narrative:      The following orders were created for panel order CBC & Differential.  Procedure                               Abnormality         Status                     ---------                               -----------         ------                     CBC Auto Differential[[id number]]        Abnormal            Final result               Scan Slide[[id number]]                                       Final result                 Please view results for these tests on the individual orders.    Comprehensive Metabolic Panel [[id number]]  (Abnormal) Collected: 04/28/24 1932    Specimen: Blood Updated: 04/28/24 2001     Glucose 101 mg/dL      BUN 19 mg/dL      Creatinine 1.29 mg/dL      Sodium 135 mmol/L      Potassium 4.3 mmol/L      Chloride 99 mmol/L      CO2 22.7 mmol/L      Calcium 8.9 mg/dL      Total Protein 7.7 g/dL      Albumin 4.0 g/dL      ALT (SGPT) 92 U/L      AST (SGOT) 118 U/L      Alkaline Phosphatase 131  U/L      Total Bilirubin 0.6 mg/dL      Globulin 3.7 gm/dL      A/G Ratio 1.1 g/dL      BUN/Creatinine Ratio 14.7     Anion Gap 13.3 mmol/L      eGFR 45.3 mL/min/1.73     Narrative:      GFR Normal >60  Chronic Kidney Disease <60  Kidney Failure <15      BNP [[id number]]  (Normal) Collected: 04/28/24 1932    Specimen: Blood Updated: 04/28/24 1959     proBNP 262.5 pg/mL     Narrative:      This assay is used as an aid in the diagnosis of individuals suspected of having heart failure. It can be used as an aid in the diagnosis of acute decompensated heart failure (ADHF) in patients presenting with signs and symptoms of ADHF to the emergency department (ED). In addition, NT-proBNP of <300 pg/mL indicates ADHF is not likely.    Age Range Result Interpretation  NT-proBNP Concentration (pg/mL:      <50             Positive            >450                   Gray                 300-450                    Negative             <300    50-75           Positive            >900                  Gray                300-900                  Negative            <300      >75             Positive            >1800                  Gray                300-1800                  Negative            <300    Single High Sensitivity Troponin T [[id number]]  (Normal) Collected: 04/28/24 1932    Specimen: Blood Updated: 04/28/24 2001     HS Troponin T 10 ng/L     Narrative:      High Sensitive Troponin T Reference Range:  <14.0 ng/L- Negative Female for AMI  <22.0 ng/L- Negative Male for AMI  >=14 - Abnormal Female indicating possible myocardial injury.  >=22 - Abnormal Male indicating possible myocardial injury.   Clinicians would have to utilize clinical acumen, EKG, Troponin, and serial changes to determine if it is an Acute Myocardial Infarction or myocardial injury due to an underlying chronic condition.         CBC Auto Differential [[id number]]  (Abnormal) Collected: 04/28/24 1932    Specimen: Blood Updated: 04/28/24 2013     WBC 2.53  10*3/mm3      RBC 4.71 10*6/mm3      Hemoglobin 11.3 g/dL      Hematocrit 36.4 %      MCV 77.3 fL      MCH 24.0 pg      MCHC 31.0 g/dL      RDW 16.5 %      RDW-SD 46.3 fl      MPV 11.2 fL      Platelets 117 10*3/mm3     Blood Culture - Blood, Arm, Left [[id number]] Collected: 04/28/24 1932    Specimen: Blood from Arm, Left Updated: 04/28/24 1938    Blood Culture - Blood, Arm, Right [[id number]] Collected: 04/28/24 1932    Specimen: Blood from Arm, Right Updated: 04/28/24 1938    Lactic Acid, Plasma [[id number]]  (Normal) Collected: 04/28/24 1932    Specimen: Blood Updated: 04/28/24 1957     Lactate 0.8 mmol/L     Scan Slide [[id number]] Collected: 04/28/24 1932    Specimen: Blood Updated: 04/28/24 2014     Smudge Cells Slight/1+     Vacuolated Neutrophils Slight/1+     Platelet Estimate Decreased     Large Platelets Slight/1+     Scan Slide --     Comment: See Manual Differential Results       Manual Differential [[id number]]  (Abnormal) Collected: 04/28/24 1932    Specimen: Blood Updated: 04/28/24 2013     Neutrophil % 45.0 %      Lymphocyte % 25.0 %      Monocyte % 4.0 %      Basophil % 1.0 %      Bands %  8.0 %      Metamyelocyte % 5.0 %      Atypical Lymphocyte % 12.0 %      Neutrophils Absolute 1.34 10*3/mm3      Lymphocytes Absolute 0.94 10*3/mm3      Monocytes Absolute 0.10 10*3/mm3      Basophils Absolute 0.03 10*3/mm3      RBC Morphology Normal     WBC Morphology Normal     Platelet Morphology Normal    COVID-19, FLU A/B, RSV PCR 1 HR TAT - Swab, Nasopharynx [[id number]]  (Normal) Collected: 04/28/24 1935    Specimen: Swab from Nasopharynx Updated: 04/28/24 2017     COVID19 Not Detected     Influenza A PCR Not Detected     Influenza B PCR Not Detected     RSV, PCR Not Detected    Narrative:      Fact sheet for providers: https://www.fda.gov/media/282217/download    Fact sheet for patients: https://www.fda.gov/media/886875/download    Test performed by PCR.             Imaging:    XR Chest 1 View    Result Date:  4/28/2024  AP PORTABLE CHEST  HISTORY: Fever and cough.  COMPARISON: 4/4/2024  TECHNIQUE: AP portable chest x-ray.  FINDINGS: Cardiac and mediastinal contours are normal. There is atherosclerotic disease in the aorta. Pulmonary vascularity is normal. The lungs appear clear. No pneumothorax.      No acute cardiopulmonary findings.  Electronically Signed By-Dr. Luis Gibbons MD On:4/28/2024 7:50 PM         Differential Diagnosis and Discussion:    Dyspnea: Differential diagnosis includes but is not limited to metabolic acidosis, neurological disorders, psychogenic, asthma, pneumothorax, upper airway obstruction, COPD, pneumonia, noncardiogenic pulmonary edema, interstitial lung disease, anemia, congestive heart failure, and pulmonary embolism    All labs were reviewed and interpreted by me.  All X-rays impressions were independently interpreted by me.  EKG was interpreted by me.    MDM     Patient is a 68-year-old female with a history of CKD, COPD, diabetes who presents with complaints of shortness of breath.  She reports that she is also had a fever today.  On exam she has an occasional wheeze noted.  She appears to be having a viral illness as she has a low white count and some shortness of breath and cough.  X-ray does not show pneumonia.  We did try to ambulate the patient as she was dropped to 86 to 87%.  Given steroids and nebs here.  Will cover with antibiotics.  Will admit to the hospital for further workup management.          Patient Care Considerations:          Consultants/Shared Management Plan:    Hospitalist: I have discussed the case with Dr. Sandoval who agrees to accept the patient for admission.    Social Determinants of Health:          Disposition and Care Coordination:    Admit:   Through independent evaluation of the patient's history, physical, and imperical data, the patient meets criteria for inpatient admission to the hospital.        Final diagnoses:   Viral illness   Acute respiratory  failure with hypoxia        ED Disposition       ED Disposition   Decision to Admit    Condition   --    Comment   --               This medical record created using voice recognition software.             Elder Garcia MD  04/28/24 1495

## 2024-04-29 ENCOUNTER — APPOINTMENT (OUTPATIENT)
Dept: CT IMAGING | Facility: HOSPITAL | Age: 69
End: 2024-04-29
Payer: MEDICARE

## 2024-04-29 LAB
ANION GAP SERPL CALCULATED.3IONS-SCNC: 13.8 MMOL/L (ref 5–15)
BACTERIA UR QL AUTO: ABNORMAL /HPF
BASOPHILS # BLD AUTO: 0.02 10*3/MM3 (ref 0–0.2)
BASOPHILS NFR BLD AUTO: 0.9 % (ref 0–1.5)
BILIRUB UR QL STRIP: NEGATIVE
BUN SERPL-MCNC: 20 MG/DL (ref 8–23)
BUN/CREAT SERPL: 18.3 (ref 7–25)
CALCIUM SPEC-SCNC: 8.2 MG/DL (ref 8.6–10.5)
CHLORIDE SERPL-SCNC: 102 MMOL/L (ref 98–107)
CLARITY UR: CLEAR
CO2 SERPL-SCNC: 20.2 MMOL/L (ref 22–29)
COLOR UR: YELLOW
CREAT SERPL-MCNC: 1.09 MG/DL (ref 0.57–1)
DEPRECATED RDW RBC AUTO: 47.8 FL (ref 37–54)
EGFRCR SERPLBLD CKD-EPI 2021: 55.4 ML/MIN/1.73
EOSINOPHIL # BLD AUTO: 0 10*3/MM3 (ref 0–0.4)
EOSINOPHIL NFR BLD AUTO: 0 % (ref 0.3–6.2)
ERYTHROCYTE [DISTWIDTH] IN BLOOD BY AUTOMATED COUNT: 16.4 % (ref 12.3–15.4)
GLUCOSE SERPL-MCNC: 167 MG/DL (ref 65–99)
GLUCOSE UR STRIP-MCNC: NEGATIVE MG/DL
GRAN CASTS URNS QL MICRO: ABNORMAL /LPF
HAV IGM SERPL QL IA: NORMAL
HBV CORE IGM SERPL QL IA: NORMAL
HBV SURFACE AG SERPL QL IA: NORMAL
HCT VFR BLD AUTO: 34.7 % (ref 34–46.6)
HCV AB SER QL: NORMAL
HGB BLD-MCNC: 10.3 G/DL (ref 12–15.9)
HGB UR QL STRIP.AUTO: ABNORMAL
HYALINE CASTS UR QL AUTO: ABNORMAL /LPF
IMM GRANULOCYTES # BLD AUTO: 0.02 10*3/MM3 (ref 0–0.05)
IMM GRANULOCYTES NFR BLD AUTO: 0.9 % (ref 0–0.5)
KETONES UR QL STRIP: ABNORMAL
LEUKOCYTE ESTERASE UR QL STRIP.AUTO: NEGATIVE
LYMPHOCYTES # BLD AUTO: 1.2 10*3/MM3 (ref 0.7–3.1)
LYMPHOCYTES NFR BLD AUTO: 56.6 % (ref 19.6–45.3)
MCH RBC QN AUTO: 23.7 PG (ref 26.6–33)
MCHC RBC AUTO-ENTMCNC: 29.7 G/DL (ref 31.5–35.7)
MCV RBC AUTO: 80 FL (ref 79–97)
MONOCYTES # BLD AUTO: 0.09 10*3/MM3 (ref 0.1–0.9)
MONOCYTES NFR BLD AUTO: 4.2 % (ref 5–12)
NEUTROPHILS NFR BLD AUTO: 0.79 10*3/MM3 (ref 1.7–7)
NEUTROPHILS NFR BLD AUTO: 37.4 % (ref 42.7–76)
NITRITE UR QL STRIP: NEGATIVE
NRBC BLD AUTO-RTO: 0 /100 WBC (ref 0–0.2)
PH UR STRIP.AUTO: 5.5 [PH] (ref 5–8)
PLATELET # BLD AUTO: 104 10*3/MM3 (ref 140–450)
PMV BLD AUTO: 11.1 FL (ref 6–12)
POTASSIUM SERPL-SCNC: 4.5 MMOL/L (ref 3.5–5.2)
PROCALCITONIN SERPL-MCNC: 0.83 NG/ML (ref 0–0.25)
PROT UR QL STRIP: ABNORMAL
RBC # BLD AUTO: 4.34 10*6/MM3 (ref 3.77–5.28)
RBC # UR STRIP: ABNORMAL /HPF
REF LAB TEST METHOD: ABNORMAL
SODIUM SERPL-SCNC: 136 MMOL/L (ref 136–145)
SP GR UR STRIP: 1.02 (ref 1–1.03)
SQUAMOUS #/AREA URNS HPF: ABNORMAL /HPF
UROBILINOGEN UR QL STRIP: ABNORMAL
WBC # UR STRIP: ABNORMAL /HPF
WBC NRBC COR # BLD AUTO: 2.12 10*3/MM3 (ref 3.4–10.8)

## 2024-04-29 PROCEDURE — 94799 UNLISTED PULMONARY SVC/PX: CPT

## 2024-04-29 PROCEDURE — 86235 NUCLEAR ANTIGEN ANTIBODY: CPT | Performed by: INTERNAL MEDICINE

## 2024-04-29 PROCEDURE — 25010000002 METHYLPREDNISOLONE PER 125 MG: Performed by: FAMILY MEDICINE

## 2024-04-29 PROCEDURE — 94664 DEMO&/EVAL PT USE INHALER: CPT

## 2024-04-29 PROCEDURE — 80074 ACUTE HEPATITIS PANEL: CPT | Performed by: INTERNAL MEDICINE

## 2024-04-29 PROCEDURE — 25510000001 IOPAMIDOL PER 1 ML: Performed by: STUDENT IN AN ORGANIZED HEALTH CARE EDUCATION/TRAINING PROGRAM

## 2024-04-29 PROCEDURE — 71260 CT THORAX DX C+: CPT

## 2024-04-29 PROCEDURE — 81001 URINALYSIS AUTO W/SCOPE: CPT | Performed by: FAMILY MEDICINE

## 2024-04-29 PROCEDURE — 80048 BASIC METABOLIC PNL TOTAL CA: CPT | Performed by: FAMILY MEDICINE

## 2024-04-29 PROCEDURE — 25010000002 FUROSEMIDE PER 20 MG: Performed by: STUDENT IN AN ORGANIZED HEALTH CARE EDUCATION/TRAINING PROGRAM

## 2024-04-29 PROCEDURE — 86431 RHEUMATOID FACTOR QUANT: CPT | Performed by: INTERNAL MEDICINE

## 2024-04-29 PROCEDURE — 99232 SBSQ HOSP IP/OBS MODERATE 35: CPT | Performed by: STUDENT IN AN ORGANIZED HEALTH CARE EDUCATION/TRAINING PROGRAM

## 2024-04-29 PROCEDURE — 84145 PROCALCITONIN (PCT): CPT | Performed by: STUDENT IN AN ORGANIZED HEALTH CARE EDUCATION/TRAINING PROGRAM

## 2024-04-29 PROCEDURE — 74177 CT ABD & PELVIS W/CONTRAST: CPT

## 2024-04-29 PROCEDURE — 85025 COMPLETE CBC W/AUTO DIFF WBC: CPT | Performed by: FAMILY MEDICINE

## 2024-04-29 PROCEDURE — 25010000002 CEFTRIAXONE PER 250 MG: Performed by: STUDENT IN AN ORGANIZED HEALTH CARE EDUCATION/TRAINING PROGRAM

## 2024-04-29 PROCEDURE — 25810000003 SODIUM CHLORIDE 0.9 % SOLUTION: Performed by: FAMILY MEDICINE

## 2024-04-29 RX ORDER — ASCORBIC ACID 500 MG
500 TABLET ORAL DAILY
Status: DISCONTINUED | OUTPATIENT
Start: 2024-04-29 | End: 2024-04-30 | Stop reason: HOSPADM

## 2024-04-29 RX ORDER — FUROSEMIDE 10 MG/ML
40 INJECTION INTRAMUSCULAR; INTRAVENOUS ONCE
Status: COMPLETED | OUTPATIENT
Start: 2024-04-29 | End: 2024-04-29

## 2024-04-29 RX ORDER — OXYBUTYNIN CHLORIDE 5 MG/1
10 TABLET, EXTENDED RELEASE ORAL 2 TIMES DAILY
Status: DISCONTINUED | OUTPATIENT
Start: 2024-04-29 | End: 2024-04-30 | Stop reason: HOSPADM

## 2024-04-29 RX ORDER — HYDROCODONE BITARTRATE AND ACETAMINOPHEN 10; 325 MG/1; MG/1
1 TABLET ORAL EVERY 6 HOURS PRN
Status: DISCONTINUED | OUTPATIENT
Start: 2024-04-29 | End: 2024-04-30 | Stop reason: HOSPADM

## 2024-04-29 RX ORDER — IPRATROPIUM BROMIDE AND ALBUTEROL SULFATE 2.5; .5 MG/3ML; MG/3ML
3 SOLUTION RESPIRATORY (INHALATION) EVERY 4 HOURS PRN
Status: DISCONTINUED | OUTPATIENT
Start: 2024-04-29 | End: 2024-04-30 | Stop reason: SDUPTHER

## 2024-04-29 RX ORDER — ASPIRIN 81 MG/1
81 TABLET ORAL DAILY
Status: DISCONTINUED | OUTPATIENT
Start: 2024-04-29 | End: 2024-04-30 | Stop reason: HOSPADM

## 2024-04-29 RX ORDER — GABAPENTIN 300 MG/1
300 CAPSULE ORAL EVERY 12 HOURS SCHEDULED
Status: DISCONTINUED | OUTPATIENT
Start: 2024-04-29 | End: 2024-04-30 | Stop reason: HOSPADM

## 2024-04-29 RX ORDER — GUAIFENESIN 200 MG/10ML
200 LIQUID ORAL EVERY 4 HOURS PRN
Status: DISCONTINUED | OUTPATIENT
Start: 2024-04-29 | End: 2024-04-30 | Stop reason: HOSPADM

## 2024-04-29 RX ORDER — BUDESONIDE AND FORMOTEROL FUMARATE DIHYDRATE 160; 4.5 UG/1; UG/1
2 AEROSOL RESPIRATORY (INHALATION)
Status: DISCONTINUED | OUTPATIENT
Start: 2024-04-29 | End: 2024-04-30 | Stop reason: HOSPADM

## 2024-04-29 RX ORDER — ONDANSETRON 2 MG/ML
4 INJECTION INTRAMUSCULAR; INTRAVENOUS EVERY 6 HOURS PRN
Status: DISCONTINUED | OUTPATIENT
Start: 2024-04-29 | End: 2024-04-30 | Stop reason: HOSPADM

## 2024-04-29 RX ORDER — LEVOTHYROXINE SODIUM 0.05 MG/1
50 TABLET ORAL
Status: DISCONTINUED | OUTPATIENT
Start: 2024-04-29 | End: 2024-04-30 | Stop reason: HOSPADM

## 2024-04-29 RX ORDER — ATORVASTATIN CALCIUM 10 MG/1
10 TABLET, FILM COATED ORAL NIGHTLY
Status: DISCONTINUED | OUTPATIENT
Start: 2024-04-29 | End: 2024-04-30 | Stop reason: HOSPADM

## 2024-04-29 RX ADMIN — IPRATROPIUM BROMIDE AND ALBUTEROL SULFATE 3 ML: .5; 3 SOLUTION RESPIRATORY (INHALATION) at 07:08

## 2024-04-29 RX ADMIN — LEVOTHYROXINE SODIUM 50 MCG: 50 TABLET ORAL at 11:17

## 2024-04-29 RX ADMIN — SODIUM CHLORIDE 100 ML/HR: 9 INJECTION, SOLUTION INTRAVENOUS at 00:29

## 2024-04-29 RX ADMIN — BUDESONIDE AND FORMOTEROL FUMARATE DIHYDRATE 2 PUFF: 160; 4.5 AEROSOL RESPIRATORY (INHALATION) at 12:11

## 2024-04-29 RX ADMIN — GUAIFENESIN 200 MG: 200 SOLUTION ORAL at 11:23

## 2024-04-29 RX ADMIN — IPRATROPIUM BROMIDE AND ALBUTEROL SULFATE 3 ML: .5; 3 SOLUTION RESPIRATORY (INHALATION) at 12:10

## 2024-04-29 RX ADMIN — FUROSEMIDE 40 MG: 10 INJECTION, SOLUTION INTRAMUSCULAR; INTRAVENOUS at 09:10

## 2024-04-29 RX ADMIN — SODIUM CHLORIDE 100 ML/HR: 9 INJECTION, SOLUTION INTRAVENOUS at 21:55

## 2024-04-29 RX ADMIN — OXYBUTYNIN CHLORIDE 10 MG: 5 TABLET, EXTENDED RELEASE ORAL at 11:17

## 2024-04-29 RX ADMIN — IOPAMIDOL 100 ML: 755 INJECTION, SOLUTION INTRAVENOUS at 16:58

## 2024-04-29 RX ADMIN — GUAIFENESIN 200 MG: 200 SOLUTION ORAL at 02:22

## 2024-04-29 RX ADMIN — Medication 10 ML: at 20:45

## 2024-04-29 RX ADMIN — AZITHROMYCIN DIHYDRATE 500 MG: 250 TABLET ORAL at 08:57

## 2024-04-29 RX ADMIN — Medication 10 ML: at 00:25

## 2024-04-29 RX ADMIN — SODIUM CHLORIDE 100 ML/HR: 9 INJECTION, SOLUTION INTRAVENOUS at 09:07

## 2024-04-29 RX ADMIN — AMOXICILLIN AND CLAVULANATE POTASSIUM 1 TABLET: 875; 125 TABLET, FILM COATED ORAL at 00:39

## 2024-04-29 RX ADMIN — ASPIRIN 81 MG: 81 TABLET, COATED ORAL at 11:17

## 2024-04-29 RX ADMIN — METHYLPREDNISOLONE SODIUM SUCCINATE 62.5 MG: 125 INJECTION INTRAMUSCULAR; INTRAVENOUS at 08:58

## 2024-04-29 RX ADMIN — CEFTRIAXONE SODIUM 1000 MG: 1 INJECTION, POWDER, FOR SOLUTION INTRAMUSCULAR; INTRAVENOUS at 12:19

## 2024-04-29 RX ADMIN — OXYCODONE HYDROCHLORIDE AND ACETAMINOPHEN 500 MG: 500 TABLET ORAL at 11:17

## 2024-04-29 RX ADMIN — BUDESONIDE AND FORMOTEROL FUMARATE DIHYDRATE 2 PUFF: 160; 4.5 AEROSOL RESPIRATORY (INHALATION) at 19:42

## 2024-04-29 RX ADMIN — GABAPENTIN 300 MG: 300 CAPSULE ORAL at 20:44

## 2024-04-29 RX ADMIN — Medication 10 ML: at 08:57

## 2024-04-29 RX ADMIN — AMOXICILLIN AND CLAVULANATE POTASSIUM 1 TABLET: 875; 125 TABLET, FILM COATED ORAL at 09:00

## 2024-04-29 RX ADMIN — OXYBUTYNIN CHLORIDE 10 MG: 5 TABLET, EXTENDED RELEASE ORAL at 20:43

## 2024-04-29 RX ADMIN — IPRATROPIUM BROMIDE AND ALBUTEROL SULFATE 3 ML: .5; 3 SOLUTION RESPIRATORY (INHALATION) at 19:41

## 2024-04-29 RX ADMIN — GABAPENTIN 300 MG: 300 CAPSULE ORAL at 11:17

## 2024-04-29 RX ADMIN — ATORVASTATIN CALCIUM 10 MG: 10 TABLET, FILM COATED ORAL at 20:44

## 2024-04-29 NOTE — CONSULTS
[facility]   Consult Note    Patient Name: Veda Peña  : 1955  MRN: [id number]  Primary Care Physician:  Lina Nguyen PA  Date of admission: 2024    Subjective   Subjective     Reason for Consult: Pancytopenia    HPI: Patient is 68 years old white female patient being admitted to the hospital because of COPD exacerbation septic and was found to have pancytopenia    Veda Peña is a 68 y.o. female Pancytopenia was seen on her admission previously she has had normal white count but she has had anemia microcytic her last colonoscopy was done about 6 years ago was unremarkable she had recently done Cologard    Review of Systems   All systems were reviewed and negative except for: Has been reviewed    Personal History     Past Medical History:   Diagnosis Date    Anemia     Anxiety     Arthritis     Asthma     Emphysema    Cancer -present    Skin    Cataract     Cataract surgery in both eyes    Cholelithiasis     Surgery    CKD (chronic kidney disease)     FOLLOWED BY PCP LAST BUN 16, CREAT 1 AND GFR 61.2023    Colon polyp     Colonoscopy, removed and biopsied    COPD (chronic obstructive pulmonary disease)     Coronary artery disease     FOLLOWED BY DR WELSH . DENIES CP BUT HAS SOA WITH EXERTION CHRONIC ISSUE. DECREASED ACTIVITY D/T BACK PAIN AND SOA    Depression     Diabetes mellitus     Pre- diabetes    Elevated glucose 2021    Fibromyalgia, primary     ?    GERD (gastroesophageal reflux disease)     Headache     History of 2019 novel coronavirus disease (COVID-19) 2021    HL (hearing loss)     Hearing aids    Hyperlipidemia     Hypothyroidism     Irritable bowel syndrome     Constipation..ongoing    Low back pain     Lower back, across hips, and down both legs    Neuropathic pain     Neuropathy     Obesity     Osteopenia     Renal insufficiency     Shortness of breath on exertion        Past Surgical History:   Procedure  Laterality Date    CARDIAC CATHETERIZATION N/A 11/17/2023    Procedure: Left Heart Cath with possible angioplasty;  Surgeon: Hema English MD;  Location: [facility] CATH INVASIVE LOCATION;  Service: Cardiovascular;  Laterality: N/A;    CHOLECYSTECTOMY      EYE SURGERY      cataract surgery of both eyes     HYSTERECTOMY      KNEE ACL RECONSTRUCTION Right     LUMBAR LAMINECTOMY Left 1/12/2024    Procedure: MINIMALLY INVASIVE LUMBAR LAMINECTOMY, left approach, lumbar 3-lumbar 4;  Surgeon: Dirk Vizcaino MD;  Location: [facility] MAIN OR;  Service: Neurosurgery;  Laterality: Left;    TUBAL ABDOMINAL LIGATION         Family History: family history includes Anxiety disorder in her daughter and daughter; Arthritis in her daughter, daughter, father, maternal grandmother, and mother; COPD in her daughter, daughter, and mother; Cancer in her brother, father, maternal grandmother, mother, paternal grandmother, and sister; Diabetes in her daughter, daughter, maternal grandmother, and mother; Early death in her daughter; Heart disease in her mother; Hypertension in her father, maternal aunt, maternal grandmother, and mother; Learning disabilities in her son; Thyroid disease in her daughter and daughter; Vision loss in her daughter and daughter. Otherwise pertinent FHx was reviewed and not pertinent to current issue.    Social History:  reports that she quit smoking about 11 years ago. Her smoking use included cigarettes and electronic cigarette. She started smoking about 51 years ago. She has a 80 pack-year smoking history. She has never used smokeless tobacco. She reports that she does not drink alcohol and does not use drugs.    Home Medications:  HYDROcodone-acetaminophen, Vitamin C, Vitamin D3, albuterol sulfate HFA, aspirin, budesonide-formoterol, cetirizine, gabapentin, ipratropium-albuterol, levothyroxine sodium, multivitamin with minerals, oxybutynin XL, and simvastatin      Allergies:  Allergies   Allergen Reactions     Tramadol Nausea And Vomiting    Codeine Palpitations    Diazepam Anxiety    Iron Other (See Comments)     REPORTS CAUSES HER TO HAVE URINARY TRACT INFECTION    Nickel Hives, Swelling and Rash    Prednisone Unknown - Low Severity     REPORTS MAKES HER REALLY ANGRY AND MAD    Venlafaxine Nausea And Vomiting       Objective   Objective     Vitals:   Temp:  [97.3 °F (36.3 °C)-102.9 °F (39.4 °C)] 97.7 °F (36.5 °C)  Heart Rate:  [62-95] 67  Resp:  [16-24] 18  BP: ()/() 94/52  Flow (L/min):  [2] 2  Physical Exam    Constitutional: Awake, alert   Eyes: PERRLA, sclerae anicteric, no conjunctival injection   HENT: NCAT, mucous membranes moist   Neck: Supple, no thyromegaly, no lymphadenopathy, trachea midline   Respiratory: Clear to auscultation bilaterally, nonlabored respirations    Cardiovascular: RRR, no murmurs, rubs, or gallops, palpable pedal pulses bilaterally   Gastrointestinal: Positive bowel sounds, soft, nontender, nondistended   Musculoskeletal: No bilateral ankle edema, no clubbing or cyanosis to extremities   Psychiatric: Appropriate affect, cooperative   Neurologic: Oriented x 3, strength symmetric in all extremities, Cranial Nerves grossly intact to confrontation, speech clear   Skin: No rashes     Result Review    Result Review:  I have personally reviewed the results from the time of this admission to 4/29/2024 16:28 EDT and agree with these findings:  [x]  Laboratory  []  Microbiology  [x]  Radiology  []  EKG/Telemetry   []  Cardiology/Vascular   []  Pathology  []  Old records  []  Other:  Most notable findings include: Has been reviewed and discussed    Assessment & Plan   Assessment / Plan     Brief Patient Summary:  Veda Peña is a 68 y.o. female who Patient with pancytopenia most likely because of infection however other etiologies haven't have to be ruled out    Active Hospital Problems:  Active Hospital Problems    Diagnosis     **COPD (chronic obstructive pulmonary disease) with acute  bronchitis     Chronic respiratory failure        Plan:   CT scan of the abdomen lab work as ordered        Electronically signed by Brad Douglass MD, 04/29/24, 4:28 PM EDT.    Part of this note may be an electronic transcription/translation of spoken language to printed text using the Dragon Dictation System.

## 2024-04-29 NOTE — PLAN OF CARE
Goal Outcome Evaluation:  Plan of Care Reviewed With: patient        Progress: no change  Outcome Evaluation: Patient A/Ox4. On room air. Up ad david. No complaints of pain. PRN Robitussin administered as ordered for cough. CT of abdomen completed. Stool sample still needed. No other issues/needs at this time.

## 2024-04-29 NOTE — PROGRESS NOTES
[facility]   Hospitalist Progress Note  Date: 2024  Patient Name: Veda Peña  : 1955  MRN: [id number]  Date of admission: 2024  Room/Bed: [facility]      Subjective   Subjective     Chief Complaint: Shortness of breath     Summary:Veda Peña is a 68 y.o. female with past medical history of anemia, CKD, COPD, hypothyroidism, hyperlipidemia, and neuropathy. Patient presents to the emergency department complaining of fever and shortness of breath. This started several days ago but is only worsened. She denies chest pain or palpitations. Here in the ED her white blood cell count is low at 2.5 her lactic acid is within normal limits however she is febrile and hypoxic. The ED provider feels that she has an acute exacerbation of COPD and acute bronchitis but is going to require more aggressive therapy than can be provided at home.  Thus, patient was admitted for further evaluation of possible acute COPD exacerbation.    Interval Followup: No acute events overnight.  Patient is laying in bed comfortably, not in acute distress.  She stated her breathing is better today.  Saturating well on room air.    CBC showing pancytopenia with significant neutropenia.  Oncology consulted.    Review of Systems    All systems reviewed and negative except for what is outlined above.      Objective   Objective     Vitals:   Temp:  [97.3 °F (36.3 °C)-102.9 °F (39.4 °C)] 97.3 °F (36.3 °C)  Heart Rate:  [64-95] 66  Resp:  [16-24] 18  BP: (108-134)/() 132/62  Flow (L/min):  [2] 2    Physical Exam   General: Awake, alert, NAD  Cardiovascular: RRR, no murmurs   Pulmonary: CTA bilaterally; no wheezes; no conversational dyspnea  Gastrointestinal: S/ND/NT, +BS  Neuro: Alert, awake, oriented x 3; speech clear; no tremor      Result Review    Result Review:  I have personally reviewed these results:  [x]  Laboratory      Lab 24  0511 24   WBC 2.12* 2.53*   HEMOGLOBIN 10.3* 11.3*   HEMATOCRIT 34.7 36.4    PLATELETS 104* 117*   NEUTROS ABS 0.79* 1.34*   IMMATURE GRANS (ABS) 0.02  --    LYMPHS ABS 1.20  --    MONOS ABS 0.09*  --    EOS ABS 0.00  --    MCV 80.0 77.3*   LACTATE  --  0.8         Lab 04/29/24  0511 04/28/24 1932   SODIUM 136 135*   POTASSIUM 4.5 4.3   CHLORIDE 102 99   CO2 20.2* 22.7   ANION GAP 13.8 13.3   BUN 20 19   CREATININE 1.09* 1.29*   EGFR 55.4* 45.3*   GLUCOSE 167* 101*   CALCIUM 8.2* 8.9         Lab 04/28/24 1932   TOTAL PROTEIN 7.7   ALBUMIN 4.0   GLOBULIN 3.7   ALT (SGPT) 92*   AST (SGOT) 118*   BILIRUBIN 0.6   ALK PHOS 131*         Lab 04/28/24 1932   PROBNP 262.5   HSTROP T 10                 Brief Urine Lab Results  (Last result in the past 365 days)        Color   Clarity   Blood   Leuk Est   Nitrite   Protein   CREAT   Urine HCG        04/29/24 0014 Yellow   Clear   Trace   Negative   Negative   30 mg/dL (1+)                 [x]  Microbiology   Microbiology Results (last 10 days)       Procedure Component Value - Date/Time    COVID-19, FLU A/B, RSV PCR 1 HR TAT - Swab, Nasopharynx [[id number]]  (Normal) Collected: 04/28/24 1935    Lab Status: Final result Specimen: Swab from Nasopharynx Updated: 04/28/24 2017     COVID19 Not Detected     Influenza A PCR Not Detected     Influenza B PCR Not Detected     RSV, PCR Not Detected    Narrative:      Fact sheet for providers: https://www.fda.gov/media/319509/download    Fact sheet for patients: https://www.fda.gov/media/814855/download    Test performed by PCR.          [x]  Radiology  XR Chest 1 View    Result Date: 4/28/2024  No acute cardiopulmonary findings.  Electronically Signed By-Dr. Luis Gibbons MD On:4/28/2024 7:50 PM     []  EKG/Telemetry   []  Cardiology/Vascular   []  Pathology  []  Old records  []  Other:    Assessment & Plan   Assessment / Plan     Assessment:  Acute exacerbation of COPD with acute bronchitis  Leukopenia  Elevated procalcitonin  Pancytopenia  Hypocalcemia, repleted  Transaminitis  Mild splenomegaly,  outpatient follow-up  Stable pulmonary nodules  History of COPD  History of hypothyroidism  History of hyperlipidemia  History of CKD  History of anemia    Plan:  Patient currently being managed in medicine service.  Currently saturating well on room air.  Elevated procalcitonin of 0.83.  Patient was febrile on admission.  Started on IV ceftriaxone.  Also on IV azithromycin for anti-inflammatory effect.  Unknown source of possible infection.  CT chest with contrast and CT abdomen pelvis with contrast was nonsignificant.  Transaminitis, likely in the setting of fatty infiltration of the liver.  Will continue to trend and monitor.  Pancytopenia, Hematology/oncology consulted.  Appreciate input.  Hypocalcemia, repleted.  Continue aspirin 81 mg daily and atorvastatin 10 mg nightly.  On IV Solu-Medrol 62.5 mg daily.  Will transition to p.o. prednisone 40 mg daily from tomorrow.  Continue rest of the current management.  PT evaluation pending.  Likely discharge in next 24-48 hours depending upon clinical condition.     Discussed with RN.    DVT prophylaxis:  Medical and mechanical DVT prophylaxis orders are present.        CODE STATUS:   Code Status (Patient has no pulse and is not breathing): CPR (Attempt to Resuscitate)  Medical Interventions (Patient has pulse or is breathing): Full Support      Electronically signed by Steven Hernandez MD, 04/29/24, 9:00 AM EDT.

## 2024-04-29 NOTE — PLAN OF CARE
Goal Outcome Evaluation:  Plan of Care Reviewed With: patient        Progress: improving  Outcome Evaluation: Patient new admit to 4nt from ed this shift. A&ox4. On 2L o2, patient soa with exertion, no home o2. c/o generalized body aches. Up ad david with cane. Prn robitussin obtained and admin for cough.

## 2024-04-29 NOTE — H&P
[facility]   HISTORY AND PHYSICAL    Patient Name: Veda Peña  : 1955  MRN: [id number]  Primary Care Physician:  Lina Nguyen PA  Date of admission: 2024    Subjective   Subjective     Chief Complaint: Shortness of breath    History of Present Illness  Patient is a 68-year-old female with past medical history of anemia CKD COPD hypothyroidism hyperlipidemia diabetes mellitus and neuropathy.  Patient presents to the emergency department complaining of fever and shortness of breath.  This started several days ago but is only worsened.  She denies chest pain or palpitations.  Here in the ED her white blood cell count is low at 2.5 her lactic acid is within normal limits however she is febrile and hypoxic.  The ED provider feels that she has an acute exacerbation of COPD and acute bronchitis but is going to require more aggressive therapy than can be provided at home.  Review of Systems   As stated above in his present illness all other systems reviewed and subsequently negative  Personal History     Past Medical History:   Diagnosis Date    Anemia     Anxiety     Arthritis     Asthma     Emphysema    Cancer -present    Skin    Cataract     Cataract surgery in both eyes    Cholelithiasis     Surgery    CKD (chronic kidney disease)     FOLLOWED BY PCP LAST BUN 16, CREAT 1 AND GFR 61.2023    Colon polyp     Colonoscopy, removed and biopsied    COPD (chronic obstructive pulmonary disease)     Coronary artery disease     FOLLOWED BY DR WELSH . DENIES CP BUT HAS SOA WITH EXERTION CHRONIC ISSUE. DECREASED ACTIVITY D/T BACK PAIN AND SOA    Depression     Diabetes mellitus     Pre- diabetes    Elevated glucose 2021    Fibromyalgia, primary     ?    GERD (gastroesophageal reflux disease)     Headache     History of 2019 novel coronavirus disease (COVID-19) 2021    HL (hearing loss)     Hearing aids    Hyperlipidemia     Hypothyroidism      Irritable bowel syndrome     Constipation..ongoing    Low back pain 2022    Lower back, across hips, and down both legs    Neuropathic pain     Neuropathy     Obesity     Osteopenia     Renal insufficiency     Shortness of breath on exertion        Past Surgical History:   Procedure Laterality Date    CARDIAC CATHETERIZATION N/A 11/17/2023    Procedure: Left Heart Cath with possible angioplasty;  Surgeon: Hema English MD;  Location: [facility] CATH INVASIVE LOCATION;  Service: Cardiovascular;  Laterality: N/A;    CHOLECYSTECTOMY      EYE SURGERY      cataract surgery of both eyes     HYSTERECTOMY      KNEE ACL RECONSTRUCTION Right     LUMBAR LAMINECTOMY Left 1/12/2024    Procedure: MINIMALLY INVASIVE LUMBAR LAMINECTOMY, left approach, lumbar 3-lumbar 4;  Surgeon: Dirk Vizcaino MD;  Location: [facility] OR;  Service: Neurosurgery;  Laterality: Left;    TUBAL ABDOMINAL LIGATION         Family History: family history includes Anxiety disorder in her daughter and daughter; Arthritis in her daughter, daughter, father, maternal grandmother, and mother; COPD in her daughter, daughter, and mother; Cancer in her brother, father, maternal grandmother, mother, paternal grandmother, and sister; Diabetes in her daughter, daughter, maternal grandmother, and mother; Early death in her daughter; Heart disease in her mother; Hypertension in her father, maternal aunt, maternal grandmother, and mother; Learning disabilities in her son; Thyroid disease in her daughter and daughter; Vision loss in her daughter and daughter. Otherwise pertinent FHx was reviewed and not pertinent to current issue.    Social History:  reports that she quit smoking about 11 years ago. Her smoking use included cigarettes and electronic cigarette. She started smoking about 51 years ago. She has a 80 pack-year smoking history. She has never used smokeless tobacco. She reports that she does not drink alcohol and does not use drugs.    Home  Medications:  HYDROcodone-acetaminophen, Senna-Natural Laxatives, Vitamin C, Vitamin D3, albuterol sulfate HFA, aspirin, azithromycin, budesonide-formoterol, cetirizine, gabapentin, ipratropium-albuterol, levothyroxine sodium, methylPREDNISolone, multivitamin with minerals, nitrofurantoin (macrocrystal-monohydrate), omeprazole, oxybutynin XL, and simvastatin    Allergies:  Allergies   Allergen Reactions    Tramadol Nausea And Vomiting    Codeine Palpitations    Diazepam Anxiety    Iron Other (See Comments)     REPORTS CAUSES HER TO HAVE URINARY TRACT INFECTION    Nickel Hives, Swelling and Rash    Prednisone Unknown - Low Severity     REPORTS MAKES HER REALLY ANGRY AND MAD    Venlafaxine Nausea And Vomiting       Objective    Objective     Vitals:   Temp:  [102.9 °F (39.4 °C)] 102.9 °F (39.4 °C)  Heart Rate:  [87-95] 87  Resp:  [21-24] 22  BP: (109-133)/() 119/61    Physical Exam  Constitutional:  Well-developed and well-nourished.  No acute distress.      HENT:  Head:  Normocephalic and atraumatic.  Mouth:  Moist mucous membranes.    Eyes:  Conjunctivae and EOM are normal. No scleral icterus.    Neck:  Neck supple.  No JVD present.    Cardiovascular:  Normal rate, regular rhythm and normal heart sounds with no murmur.  Pulmonary/Chest:  No respiratory distress, no wheezes, no crackles, with normal breath sounds and good air movement.  Abdominal:  Soft. No distension and no tenderness.   Musculoskeletal:  No tenderness, and no deformity.  No red or swollen joints anywhere.    Neurological:  Alert and oriented to person, place, and time.  No cranial nerve deficit.    Skin:  Skin is warm and dry. No rash noted. No pallor.   Peripheral vascular:  No clubbing, no cyanosis, no edema.  Psychiatric: Appropriate mood and affect  :    Result Review    Result Review:  I have personally reviewed the results from the time of this admission to 4/28/2024 21:34 EDT and agree with these findings:  [x]  Laboratory list /  accordion  []  Microbiology  [x]  Radiology  []  EKG/Telemetry   []  Cardiology/Vascular   []  Pathology  []  Old records  []  Other:  Most notable findings include:  Labs evaluated.  Mild pancytopenia noted  Assessment & Plan   Assessment / Plan     Brief Patient Summary:  Veda Peña is a 68 y.o. female who   Active Hospital Problems:  1.  Acute exacerbation of COPD with acute bronchitis  2.  Acute on chronic hypoxic respiratory failure  3.  Pancytopenia  4.  Chronic renal failure  Plan:   Patient will be admitted to the hospital service  Patient has been started on the COPD pathway  Will start empiric antibiotics  Nebulizer treatments every 4 hours and as needed  Of started IV corticosteroids  Upper respiratory panel  Will continue to support with O2 via nasal cannula  Will keep her on telemetry for closer monitoring    DVT prophylaxis:  No DVT prophylaxis order currently exists.        CODE STATUS:       Admission Status:  I believe this patient meets inpatient status.    David Sandoval, DO

## 2024-04-30 ENCOUNTER — READMISSION MANAGEMENT (OUTPATIENT)
Dept: CALL CENTER | Facility: HOSPITAL | Age: 69
End: 2024-04-30
Payer: MEDICARE

## 2024-04-30 VITALS
RESPIRATION RATE: 18 BRPM | HEART RATE: 62 BPM | OXYGEN SATURATION: 93 % | SYSTOLIC BLOOD PRESSURE: 119 MMHG | TEMPERATURE: 97.5 F | BODY MASS INDEX: 33.56 KG/M2 | WEIGHT: 166.45 LBS | DIASTOLIC BLOOD PRESSURE: 61 MMHG | HEIGHT: 59 IN

## 2024-04-30 LAB
ALBUMIN SERPL-MCNC: 3.7 G/DL (ref 3.5–5.2)
ALBUMIN/GLOB SERPL: 1.2 G/DL
ALP SERPL-CCNC: 100 U/L (ref 39–117)
ALT SERPL W P-5'-P-CCNC: 66 U/L (ref 1–33)
ANION GAP SERPL CALCULATED.3IONS-SCNC: 12.4 MMOL/L (ref 5–15)
AST SERPL-CCNC: 64 U/L (ref 1–32)
BASOPHILS # BLD AUTO: 0.02 10*3/MM3 (ref 0–0.2)
BASOPHILS NFR BLD AUTO: 0.3 % (ref 0–1.5)
BILIRUB SERPL-MCNC: 0.2 MG/DL (ref 0–1.2)
BUN SERPL-MCNC: 18 MG/DL (ref 8–23)
BUN/CREAT SERPL: 20.9 (ref 7–25)
CALCIUM SPEC-SCNC: 8.3 MG/DL (ref 8.6–10.5)
CHLORIDE SERPL-SCNC: 106 MMOL/L (ref 98–107)
CO2 SERPL-SCNC: 23.6 MMOL/L (ref 22–29)
CREAT SERPL-MCNC: 0.86 MG/DL (ref 0.57–1)
DEPRECATED RDW RBC AUTO: 48 FL (ref 37–54)
EGFRCR SERPLBLD CKD-EPI 2021: 73.7 ML/MIN/1.73
EOSINOPHIL # BLD AUTO: 0 10*3/MM3 (ref 0–0.4)
EOSINOPHIL NFR BLD AUTO: 0 % (ref 0.3–6.2)
ERYTHROCYTE [DISTWIDTH] IN BLOOD BY AUTOMATED COUNT: 16.8 % (ref 12.3–15.4)
FERRITIN SERPL-MCNC: 4492 NG/ML (ref 13–150)
FOLATE SERPL-MCNC: >20 NG/ML (ref 4.78–24.2)
GLOBULIN UR ELPH-MCNC: 3.2 GM/DL
GLUCOSE SERPL-MCNC: 166 MG/DL (ref 65–99)
HAPTOGLOB SERPL-MCNC: 244 MG/DL (ref 30–200)
HCT VFR BLD AUTO: 33.7 % (ref 34–46.6)
HCYS SERPL-MCNC: 7.2 UMOL/L (ref 0–15)
HGB BLD-MCNC: 10.1 G/DL (ref 12–15.9)
IMM GRANULOCYTES # BLD AUTO: 0.03 10*3/MM3 (ref 0–0.05)
IMM GRANULOCYTES NFR BLD AUTO: 0.5 % (ref 0–0.5)
IRON 24H UR-MRATE: 65 MCG/DL (ref 37–145)
IRON SATN MFR SERPL: 24 % (ref 20–50)
LDH SERPL-CCNC: 383 U/L (ref 135–214)
LYMPHOCYTES # BLD AUTO: 1.97 10*3/MM3 (ref 0.7–3.1)
LYMPHOCYTES NFR BLD AUTO: 30.5 % (ref 19.6–45.3)
MAGNESIUM SERPL-MCNC: 2.6 MG/DL (ref 1.6–2.4)
MCH RBC QN AUTO: 23.8 PG (ref 26.6–33)
MCHC RBC AUTO-ENTMCNC: 30 G/DL (ref 31.5–35.7)
MCV RBC AUTO: 79.5 FL (ref 79–97)
MONOCYTES # BLD AUTO: 0.46 10*3/MM3 (ref 0.1–0.9)
MONOCYTES NFR BLD AUTO: 7.1 % (ref 5–12)
NEUTROPHILS NFR BLD AUTO: 3.98 10*3/MM3 (ref 1.7–7)
NEUTROPHILS NFR BLD AUTO: 61.6 % (ref 42.7–76)
NRBC BLD AUTO-RTO: 0 /100 WBC (ref 0–0.2)
PHOSPHATE SERPL-MCNC: 3 MG/DL (ref 2.5–4.5)
PLAT MORPH BLD: NORMAL
PLATELET # BLD AUTO: 140 10*3/MM3 (ref 140–450)
PMV BLD AUTO: 11.4 FL (ref 6–12)
POTASSIUM SERPL-SCNC: 4.1 MMOL/L (ref 3.5–5.2)
PROT SERPL-MCNC: 6.9 G/DL (ref 6–8.5)
QT INTERVAL: 340 MS
QTC INTERVAL: 403 MS
RBC # BLD AUTO: 4.24 10*6/MM3 (ref 3.77–5.28)
RBC MORPH BLD: NORMAL
RETICS # AUTO: 0.03 10*6/MM3 (ref 0.02–0.13)
RETICS/RBC NFR AUTO: 0.73 % (ref 0.7–1.9)
SODIUM SERPL-SCNC: 142 MMOL/L (ref 136–145)
TIBC SERPL-MCNC: 274 MCG/DL (ref 298–536)
TRANSFERRIN SERPL-MCNC: 184 MG/DL (ref 200–360)
VIT B12 BLD-MCNC: 1223 PG/ML (ref 211–946)
WBC MORPH BLD: NORMAL
WBC NRBC COR # BLD AUTO: 6.46 10*3/MM3 (ref 3.4–10.8)

## 2024-04-30 PROCEDURE — 83540 ASSAY OF IRON: CPT | Performed by: INTERNAL MEDICINE

## 2024-04-30 PROCEDURE — 82784 ASSAY IGA/IGD/IGG/IGM EACH: CPT | Performed by: INTERNAL MEDICINE

## 2024-04-30 PROCEDURE — 85045 AUTOMATED RETICULOCYTE COUNT: CPT | Performed by: INTERNAL MEDICINE

## 2024-04-30 PROCEDURE — 94664 DEMO&/EVAL PT USE INHALER: CPT

## 2024-04-30 PROCEDURE — 83090 ASSAY OF HOMOCYSTEINE: CPT | Performed by: INTERNAL MEDICINE

## 2024-04-30 PROCEDURE — 25010000002 METHYLPREDNISOLONE PER 125 MG: Performed by: FAMILY MEDICINE

## 2024-04-30 PROCEDURE — 94799 UNLISTED PULMONARY SVC/PX: CPT

## 2024-04-30 PROCEDURE — 82607 VITAMIN B-12: CPT | Performed by: INTERNAL MEDICINE

## 2024-04-30 PROCEDURE — 84100 ASSAY OF PHOSPHORUS: CPT | Performed by: STUDENT IN AN ORGANIZED HEALTH CARE EDUCATION/TRAINING PROGRAM

## 2024-04-30 PROCEDURE — 25810000003 SODIUM CHLORIDE 0.9 % SOLUTION: Performed by: FAMILY MEDICINE

## 2024-04-30 PROCEDURE — 99239 HOSP IP/OBS DSCHRG MGMT >30: CPT | Performed by: INTERNAL MEDICINE

## 2024-04-30 PROCEDURE — 83521 IG LIGHT CHAINS FREE EACH: CPT | Performed by: INTERNAL MEDICINE

## 2024-04-30 PROCEDURE — 81256 HFE GENE: CPT | Performed by: INTERNAL MEDICINE

## 2024-04-30 PROCEDURE — 85025 COMPLETE CBC W/AUTO DIFF WBC: CPT | Performed by: STUDENT IN AN ORGANIZED HEALTH CARE EDUCATION/TRAINING PROGRAM

## 2024-04-30 PROCEDURE — 86334 IMMUNOFIX E-PHORESIS SERUM: CPT | Performed by: INTERNAL MEDICINE

## 2024-04-30 PROCEDURE — 84466 ASSAY OF TRANSFERRIN: CPT | Performed by: INTERNAL MEDICINE

## 2024-04-30 PROCEDURE — 83010 ASSAY OF HAPTOGLOBIN QUANT: CPT | Performed by: INTERNAL MEDICINE

## 2024-04-30 PROCEDURE — 85007 BL SMEAR W/DIFF WBC COUNT: CPT | Performed by: STUDENT IN AN ORGANIZED HEALTH CARE EDUCATION/TRAINING PROGRAM

## 2024-04-30 PROCEDURE — 83735 ASSAY OF MAGNESIUM: CPT | Performed by: STUDENT IN AN ORGANIZED HEALTH CARE EDUCATION/TRAINING PROGRAM

## 2024-04-30 PROCEDURE — 86738 MYCOPLASMA ANTIBODY: CPT | Performed by: INTERNAL MEDICINE

## 2024-04-30 PROCEDURE — 82728 ASSAY OF FERRITIN: CPT | Performed by: INTERNAL MEDICINE

## 2024-04-30 PROCEDURE — 83615 LACTATE (LD) (LDH) ENZYME: CPT | Performed by: INTERNAL MEDICINE

## 2024-04-30 PROCEDURE — 80053 COMPREHEN METABOLIC PANEL: CPT | Performed by: INTERNAL MEDICINE

## 2024-04-30 PROCEDURE — 82746 ASSAY OF FOLIC ACID SERUM: CPT | Performed by: INTERNAL MEDICINE

## 2024-04-30 RX ORDER — PREDNISONE 20 MG/1
40 TABLET ORAL
Status: DISCONTINUED | OUTPATIENT
Start: 2024-05-01 | End: 2024-04-30 | Stop reason: HOSPADM

## 2024-04-30 RX ORDER — IPRATROPIUM BROMIDE AND ALBUTEROL SULFATE 2.5; .5 MG/3ML; MG/3ML
3 SOLUTION RESPIRATORY (INHALATION) EVERY 6 HOURS PRN
Status: DISCONTINUED | OUTPATIENT
Start: 2024-04-30 | End: 2024-04-30 | Stop reason: HOSPADM

## 2024-04-30 RX ORDER — GUAIFENESIN 200 MG/10ML
200 LIQUID ORAL 4 TIMES DAILY PRN
Qty: 118 ML | Refills: 0 | Status: SHIPPED | OUTPATIENT
Start: 2024-04-30

## 2024-04-30 RX ORDER — AMOXICILLIN AND CLAVULANATE POTASSIUM 875; 125 MG/1; MG/1
1 TABLET, FILM COATED ORAL 2 TIMES DAILY
Qty: 6 TABLET | Refills: 0 | Status: SHIPPED | OUTPATIENT
Start: 2024-04-30 | End: 2024-05-03

## 2024-04-30 RX ADMIN — BUDESONIDE AND FORMOTEROL FUMARATE DIHYDRATE 2 PUFF: 160; 4.5 AEROSOL RESPIRATORY (INHALATION) at 07:31

## 2024-04-30 RX ADMIN — Medication 10 ML: at 08:41

## 2024-04-30 RX ADMIN — IPRATROPIUM BROMIDE AND ALBUTEROL SULFATE 3 ML: .5; 3 SOLUTION RESPIRATORY (INHALATION) at 00:15

## 2024-04-30 RX ADMIN — OXYBUTYNIN CHLORIDE 10 MG: 5 TABLET, EXTENDED RELEASE ORAL at 08:41

## 2024-04-30 RX ADMIN — METHYLPREDNISOLONE SODIUM SUCCINATE 62.5 MG: 125 INJECTION INTRAMUSCULAR; INTRAVENOUS at 08:40

## 2024-04-30 RX ADMIN — SODIUM CHLORIDE 100 ML/HR: 9 INJECTION, SOLUTION INTRAVENOUS at 08:40

## 2024-04-30 RX ADMIN — IPRATROPIUM BROMIDE AND ALBUTEROL SULFATE 3 ML: .5; 3 SOLUTION RESPIRATORY (INHALATION) at 07:31

## 2024-04-30 RX ADMIN — OXYCODONE HYDROCHLORIDE AND ACETAMINOPHEN 500 MG: 500 TABLET ORAL at 08:41

## 2024-04-30 RX ADMIN — LEVOTHYROXINE SODIUM 50 MCG: 50 TABLET ORAL at 05:16

## 2024-04-30 RX ADMIN — AZITHROMYCIN DIHYDRATE 500 MG: 250 TABLET ORAL at 08:41

## 2024-04-30 RX ADMIN — ASPIRIN 81 MG: 81 TABLET, COATED ORAL at 08:41

## 2024-04-30 RX ADMIN — HYDROCODONE BITARTRATE AND ACETAMINOPHEN 1 TABLET: 10; 325 TABLET ORAL at 06:22

## 2024-04-30 NOTE — PLAN OF CARE
Goal Outcome Evaluation:  Plan of Care Reviewed With: patient        Progress: no change  Outcome Evaluation: Patient A&O x 4 on room air and up ad david with no complaints of pain until this morning, administered norco per MAR, pain 6/10 generalized joints but especially left hip. S/p shower, stool specimen still needed. Eyes open, sitting at bedside, no needs voiced, call light in reach.

## 2024-04-30 NOTE — PLAN OF CARE
Goal Outcome Evaluation:  Plan of Care Reviewed With: patient        Progress: no change  Outcome Evaluation: Pt remained A&Ox4 this shift. Also, pt remained on room air maintaining stable oxygen saturation. Pt denied pain this shift. Pt is to dc this shift. All other vital signs remained stable.

## 2024-04-30 NOTE — DISCHARGE SUMMARY
[facility]         HOSPITALIST  DISCHARGE SUMMARY    Patient Name: Veda Peña  : 1955  MRN: [id number]    Date of Admission: 2024  Date of Discharge:  24  Primary Care Physician: Lina Nguyen PA    Consults       Date and Time Order Name Status Description    2024 10:54 AM Hematology & Oncology Inpatient Consult      2024  9:05 PM Inpatient Hospitalist Consult              Active and Resolved Hospital Problems:  COPD with acute exacerbation  Acute bronchitis; likely viral etiology  Acute kidney injury  Pancytopenia  Splenomegaly  Type 2 diabetes with complication  Peripheral neuropathy  Hypothyroidism  Hypertension  Hyperlipidemia    Hospital Course     Hospital Course:  Veda Peña is a 68 y.o. female with COPD who presented to the hospital complaining of shortness of breath and fever.  SpO2 91% on room air. Temperature 102.9.  WBC 2.53.  Lactate normal.  Chest x-ray no acute process.  Patient became hypoxic with ambulation with SpO2 dropping to 86% and was placed on 2 L of oxygen.  COPD exacerbation/viral bronchitis suspected. She was started on empiric antibiotics, nebulizers, steroids.  CT chest no acute process.  CT abdomen pelvis notable for mild splenomegaly She was noted to be pancytopenic which was new.  She was evaluated by hematology.  Majority of her blood work was unremarkable.  Cell counts were attributed to viral illness and both white count and platelets normalized.  Hemoglobin remained stable.  No signs of bleeding.  Blood cultures negative.  COVID/influenza/RSV negative.  She was feeling better and was weaned to room air. Outpatient referral to pulmonology placed to establish care.  Discussed outpatient follow-up with PCP and hematology.  Discharged home in stable condition    Day of Discharge     Vital Signs:  Temp:  [97.3 °F (36.3 °C)-97.7 °F (36.5 °C)] 97.5 °F (36.4 °C)  Heart Rate:  [61-77] 62  Resp:  [16-18] 18  BP:  ()/(41-61) 119/61  Physical Exam:   GENERAL: The patient is conversant and nontoxic.  HEART: Regular rate and rhythm. No edema  LUNGS: Clear, nonlabored  ABDOMEN: Soft, nondistended  SKIN: No rash or wounds  NEUROLOGIC: Alert, CN grossly intact, speech clear        Discharge Details        Discharge Medications        New Medications        Instructions Start Date   amoxicillin-clavulanate 875-125 MG per tablet  Commonly known as: AUGMENTIN   1 tablet, Oral, 2 Times Daily      guaifenesin 100 MG/5ML liquid  Commonly known as: ROBITUSSIN   200 mg, Oral, 4 Times Daily PRN             Changes to Medications        Instructions Start Date   aspirin 81 MG EC tablet  What changed: additional instructions   81 mg, Oral, Daily             Continue These Medications        Instructions Start Date   albuterol sulfate  (90 Base) MCG/ACT inhaler  Commonly known as: PROVENTIL HFA;VENTOLIN HFA;PROAIR HFA   2 puffs, Inhalation, Every 6 Hours PRN      budesonide-formoterol 80-4.5 MCG/ACT inhaler  Commonly known as: Symbicort   2 puffs, Inhalation, 2 Times Daily - RT      cetirizine 10 MG tablet  Commonly known as: zyrTEC   10 mg, Oral, Daily PRN      gabapentin 300 MG capsule  Commonly known as: NEURONTIN   1 capsule, Oral, 2 Times Daily PRN      HYDROcodone-acetaminophen  MG per tablet  Commonly known as: NORCO   1 tablet, Oral, Every 6 Hours PRN      ipratropium-albuterol 0.5-2.5 mg/3 ml nebulizer  Commonly known as: DUO-NEB   3 mL, Nebulization, Every 4 Hours PRN      levothyroxine sodium 50 MCG capsule  Commonly known as: TIROSINT   50 mcg, Oral, Daily      multivitamin with minerals tablet tablet   1 tablet, Oral, Daily      oxybutynin XL 10 MG 24 hr tablet  Commonly known as: DITROPAN-XL   10 mg, Oral, 2 Times Daily      simvastatin 20 MG tablet  Commonly known as: ZOCOR   20 mg, Oral, Nightly      Vitamin C 500 MG capsule   1 capsule, Oral, Daily      Vitamin D3 75 MCG (3000 UT) tablet   1 tablet, Oral,  Every Other Day               Allergies   Allergen Reactions   • Tramadol Nausea And Vomiting   • Codeine Palpitations   • Diazepam Anxiety   • Iron Other (See Comments)     REPORTS CAUSES HER TO HAVE URINARY TRACT INFECTION   • Nickel Hives, Swelling and Rash   • Prednisone Unknown - Low Severity     REPORTS MAKES HER REALLY ANGRY AND MAD   • Venlafaxine Nausea And Vomiting       Discharge Disposition:  Home or Self Care    Diet:  Hospital:  Diet Order   Procedures   • Diet: Cardiac; Healthy Heart (2-3 Na+); Fluid Consistency: Thin (IDDSI 0)       Discharge Activity:   Activity Instructions       Activity as Tolerated              CODE STATUS:  Code Status and Medical Interventions:   Ordered at: 04/28/24 2142     Code Status (Patient has no pulse and is not breathing):    CPR (Attempt to Resuscitate)     Medical Interventions (Patient has pulse or is breathing):    Full Support         Future Appointments   Date Time Provider Department Center   5/9/2024  9:30 AM Lina Nguyen PA [facility] [place] [facility]   12/9/2024 10:30 AM Hema English MD [facility] [place] [facility]       Additional Instructions for the Follow-ups that You Need to Schedule       Ambulatory Referral to Hematology / Oncology   As directed      Seen in hospital - 2 week follow up    Discharge Follow-up with PCP   As directed       Currently Documented PCP:    Lina Nguyen PA    PCP Phone Number:    [phone]     Follow Up Details: 1 week                Pertinent  and/or Most Recent Results     PROCEDURES:   NONE    LAB RESULTS:      Lab 04/30/24  0452 04/29/24  0511 04/28/24  1932   WBC 6.46 2.12* 2.53*   HEMOGLOBIN 10.1* 10.3* 11.3*   HEMATOCRIT 33.7* 34.7 36.4   PLATELETS 140 104* 117*   NEUTROS ABS 3.98 0.79* 1.34*   IMMATURE GRANS (ABS) 0.03 0.02  --    LYMPHS ABS 1.97 1.20  --    MONOS ABS 0.46 0.09*  --    EOS ABS 0.00 0.00  --    MCV 79.5 80.0 77.3*   PROCALCITONIN  --  0.83*  --    LACTATE  --   --  0.8   *  --   --           Lab 04/30/24  0452 04/29/24  0511 04/28/24 1932   SODIUM 142 136 135*   POTASSIUM 4.1 4.5 4.3   CHLORIDE 106 102 99   CO2 23.6 20.2* 22.7   ANION GAP 12.4 13.8 13.3   BUN 18 20 19   CREATININE 0.86 1.09* 1.29*   EGFR 73.7 55.4* 45.3*   GLUCOSE 166* 167* 101*   CALCIUM 8.3* 8.2* 8.9   MAGNESIUM 2.6*  --   --    PHOSPHORUS 3.0  --   --          Lab 04/30/24  0452 04/28/24 1932   TOTAL PROTEIN 6.9 7.7   ALBUMIN 3.7 4.0   GLOBULIN 3.2 3.7   ALT (SGPT) 66* 92*   AST (SGOT) 64* 118*   BILIRUBIN 0.2 0.6   ALK PHOS 100 131*         Lab 04/28/24 1932   PROBNP 262.5   HSTROP T 10             Lab 04/30/24 0452   IRON 65   IRON SATURATION (TSAT) 24   TIBC 274*   TRANSFERRIN 184*   FERRITIN 4,492.00*         Brief Urine Lab Results  (Last result in the past 365 days)        Color   Clarity   Blood   Leuk Est   Nitrite   Protein   CREAT   Urine HCG        04/29/24 0014 Yellow   Clear   Trace   Negative   Negative   30 mg/dL (1+)                 Microbiology Results (last 10 days)       Procedure Component Value - Date/Time    COVID-19, FLU A/B, RSV PCR 1 HR TAT - Swab, Nasopharynx [[id number]]  (Normal) Collected: 04/28/24 1935    Lab Status: Final result Specimen: Swab from Nasopharynx Updated: 04/28/24 2017     COVID19 Not Detected     Influenza A PCR Not Detected     Influenza B PCR Not Detected     RSV, PCR Not Detected    Narrative:      Fact sheet for providers: https://www.fda.gov/media/154522/download    Fact sheet for patients: https://www.fda.gov/media/204482/download    Test performed by PCR.    Blood Culture - Blood, Arm, Left [[id number]]  (Normal) Collected: 04/28/24 1932    Lab Status: Preliminary result Specimen: Blood from Arm, Left Updated: 04/29/24 1946     Blood Culture No growth at 24 hours    Blood Culture - Blood, Arm, Right [[id number]]  (Normal) Collected: 04/28/24 1932    Lab Status: Preliminary result Specimen: Blood from Arm, Right Updated: 04/29/24 1946     Blood Culture No growth at 24 hours             CT Chest With Contrast Diagnostic    Result Date: 4/29/2024  Impression: Stable exam. No acute findings.    Electronically Signed By-EVELIN DUFFY MD On:4/29/2024 5:28 PM      CT Abdomen Pelvis With Contrast    Result Date: 4/29/2024  Impression:  1. Mild fatty infiltration of the liver.  2. Mild splenomegaly measuring 13.7 cm.    Electronically Signed By-EVELIN DUFFY MD On:4/29/2024 5:22 PM      XR Chest 1 View    Result Date: 4/28/2024  No acute cardiopulmonary findings.  Electronically Signed By-Dr. Luis Gibbons MD On:4/28/2024 7:50 PM                Results for orders placed during the hospital encounter of 03/17/23    Adult Transthoracic Echo Complete W/ Cont if Necessary Per Protocol    Interpretation Summary  •  Left ventricular systolic function is normal. Left ventricular ejection fraction appears to be 56 - 60%.  •  Left ventricular diastolic function is consistent with (grade I) impaired relaxation.  •  There are no significant valvular abnormalities.  •  Estimated right ventricular systolic pressure from tricuspid regurgitation is normal (<35 mmHg).      Labs Pending at Discharge:  Pending Labs       Order Current Status    Flow Cytometry In process    Folate In process    Haptoglobin In process    Hemochromatosis Mutation In process    Homocysteine In process    Immunofixation, Serum In process    Immunoglobulin Free LT Chains Blood In process    Mycoplasma Pneu. IgG / IgM Antibodies In process    Systemic Lupus Profile A In process    Vitamin B12 In process    Blood Culture - Blood, Arm, Left Preliminary result    Blood Culture - Blood, Arm, Right Preliminary result              Time spent on Discharge including face to face service: >30 minutes    Electronically signed by Baljinder Haley DO, 04/30/24, 9:26 AM EDT.

## 2024-04-30 NOTE — PROGRESS NOTES
[facility]     Progress Note    Patient Name: Veda Peña  : 1955  MRN: [id number]  Primary Care Physician:  Lina Nguyen PA  Date of admission: 2024    Subjective   Subjective     Chief Complaint: Evaded ferritin levels will get studies for hemochromatosis past history of alcoholic abuse splenomegaly level of workup is still pending but white count has already improved was probably bad because of infection multifactorial and splenomegaly thrombocytopenia probably because of splenomegaly    HPI: With pancytopenia improved    Review of Systems   All systems were reviewed and negative except for: Has been reviewed    Objective   Objective     Vitals:   Temp:  [97.3 °F (36.3 °C)-97.7 °F (36.5 °C)] 97.5 °F (36.4 °C)  Heart Rate:  [61-77] 73  Resp:  [16-18] 18  BP: ()/(41-52) 97/41    Physical Exam    Constitutional: Awake, alert   Eyes: PERRLA, sclerae anicteric, no conjunctival injection   HENT: NCAT, mucous membranes moist   Neck: Supple, no thyromegaly, no lymphadenopathy, trachea midline   Respiratory: Clear to auscultation bilaterally, nonlabored respirations    Cardiovascular: RRR, no murmurs, rubs, or gallops, palpable pedal pulses bilaterally   Gastrointestinal: Positive bowel sounds, soft, nontender, nondistended   Musculoskeletal: No bilateral ankle edema, no clubbing or cyanosis to extremities   Psychiatric: Appropriate affect, cooperative   Neurologic: Oriented x 3, strength symmetric in all extremities, Cranial Nerves grossly intact to confrontation, speech clear   Skin: No rashes   No change  Result Review    Result Review:  I have personally reviewed the results from the time of this admission to 2024 07:31 EDT and agree with these findings:  [x]  Laboratory  []  Microbiology  []  Radiology  []  EKG/Telemetry   []  Cardiology/Vascular   []  Pathology  []  Old records  []  Other:  Most notable findings include: Cytopenia multifactorial including infection and  splenomegaly    Assessment & Plan   Assessment / Plan     Brief Patient Summary:  Veda Peña is a 68 y.o. female who comfortable doing well not in acute distress    Active Hospital Problems:  Active Hospital Problems    Diagnosis     **COPD (chronic obstructive pulmonary disease) with acute bronchitis     Chronic respiratory failure        Plan:   As per primary    DVT prophylaxis:  Medical and mechanical DVT prophylaxis orders are present.        CODE STATUS:   Code Status (Patient has no pulse and is not breathing): CPR (Attempt to Resuscitate)  Medical Interventions (Patient has pulse or is breathing): Full Support    Disposition:  I expect patient to be discharged after the patient has been stabilized.    Electronically signed by Brad Douglass MD, 04/30/24, 7:31 AM EDT.      Part of this note may be an electronic transcription/translation of spoken language to printed text using the Dragon Dictation System.

## 2024-04-30 NOTE — SIGNIFICANT NOTE
04/30/24 1123   Physical Therapy Time and Intention   Comment, Session Not Performed Patient signing D/C paperwork.

## 2024-04-30 NOTE — CASE MANAGEMENT/SOCIAL WORK
"COPD education was given to Ms Peña via the booklet, \"Understanding and Living with COPD\". Discussion of the COPD zones (Green/Yellow/Red) was completed with emphasizes on what to do in the yellow and red zones. COPD action plan was reviewed with instruction on rescue and maintenance medications, the function of each and the importance of using them as prescribed. MDI administration with a spacer was instructed; patient was provided with spacer for discharge.   "

## 2024-04-30 NOTE — OUTREACH NOTE
Prep Survey      Flowsheet Row Responses   [facility] facility patient discharged from? [facility]   Is LACE score < 7 ? No   Eligibility [facility]   Date of Admission 04/28/24   Date of Discharge 04/30/24   Discharge Disposition Home or Self Care   Discharge diagnosis COPD   Does the patient have one of the following disease processes/diagnoses(primary or secondary)? COPD   Does the patient have Home health ordered? No   Is there a DME ordered? No   Prep survey completed? Yes            LES MILLER - Registered Nurse

## 2024-05-01 ENCOUNTER — TRANSITIONAL CARE MANAGEMENT TELEPHONE ENCOUNTER (OUTPATIENT)
Dept: CALL CENTER | Facility: HOSPITAL | Age: 69
End: 2024-05-01
Payer: MEDICARE

## 2024-05-01 LAB
CHROMATIN AB SERPL-ACNC: <0.2 AI (ref 0–0.9)
DSDNA AB SER-ACNC: <1 IU/ML (ref 0–9)
ENA RNP AB SER-ACNC: <0.2 AI (ref 0–0.9)
ENA SM AB SER-ACNC: <0.2 AI (ref 0–0.9)
ENA SS-A AB SER-ACNC: <0.2 AI (ref 0–0.9)
ENA SS-B AB SER-ACNC: <0.2 AI (ref 0–0.9)
IGA SERPL-MCNC: 271 MG/DL (ref 87–352)
IGG SERPL-MCNC: 832 MG/DL (ref 586–1602)
IGM SERPL-MCNC: 94 MG/DL (ref 26–217)
KAPPA LC FREE SER-MCNC: 26 MG/L (ref 3.3–19.4)
KAPPA LC FREE/LAMBDA FREE SER: 1.04 {RATIO} (ref 0.26–1.65)
LAMBDA LC FREE SERPL-MCNC: 25 MG/L (ref 5.7–26.3)
Lab: NORMAL
M PNEUMO IGG SER IA-ACNC: 639 U/ML (ref 0–99)
M PNEUMO IGM SER IA-ACNC: <770 U/ML (ref 0–769)
PROT PATTERN SERPL IFE-IMP: NORMAL
RHEUMATOID FACT SERPL-ACNC: <10 IU/ML

## 2024-05-01 NOTE — OUTREACH NOTE
Call Center TCM Note      Flowsheet Row Responses   [facility] facility patient discharged from? [facility]   Does the patient have one of the following disease processes/diagnoses(primary or secondary)? COPD   TCM attempt successful? No  [VR for Kam Haasral, brother]   Unsuccessful attempts Attempt 1  [attempted pt and brother]            Yaneth Multani RN    5/1/2024, 10:39 EDT

## 2024-05-01 NOTE — OUTREACH NOTE
Call Center TCM Note      Flowsheet Row Responses   [facility] patient discharged from? [facility]   Does the patient have one of the following disease processes/diagnoses(primary or secondary)? COPD   TCM attempt successful? Yes   Call start time 1351   Call end time 1356   Discharge diagnosis COPD   Meds reviewed with patient/caregiver? Yes   Is the patient having any side effects they believe may be caused by any medication additions or changes? No   Does the patient have all medications ordered at discharge? Yes   Prescription comments Pt taking meds as ordered,  also using MDI and nebs as prescribed   Is the patient taking all medications as directed (includes completed medication regime)? Yes   Comments PCP appt on 5/9/24---reviewed other appt needs   Does the patient have an appointment with their PCP within 7-14 days of discharge? Yes   Has home health visited the patient within 72 hours of discharge? N/A   Psychosocial issues? No   Did the patient receive a copy of their discharge instructions? Yes   Nursing interventions Reviewed instructions with patient   What is the patient's perception of their health status since discharge? Same  [Reprots she is tired, still with residual cough amd SOA at times, Has tx's for us and is resting today. Reviewed worsening s/s and actions to take.  No questions today.]   Nursing Interventions Nurse provided patient education   Is the patient/caregiver able to teach back the hierarchy of who to call/visit for symptoms/problems? PCP, Specialist, Home health nurse, Urgent Care, ED, 911 Yes   TCM call completed? Yes   Call end time 1356            Yaneth Multani RN    5/1/2024, 13:59 EDT

## 2024-05-03 LAB
BACTERIA SPEC AEROBE CULT: NORMAL
BACTERIA SPEC AEROBE CULT: NORMAL

## 2024-05-09 ENCOUNTER — LAB (OUTPATIENT)
Dept: LAB | Facility: HOSPITAL | Age: 69
End: 2024-05-09
Payer: MEDICARE

## 2024-05-09 ENCOUNTER — OFFICE VISIT (OUTPATIENT)
Dept: FAMILY MEDICINE CLINIC | Facility: CLINIC | Age: 69
End: 2024-05-09
Payer: MEDICARE

## 2024-05-09 VITALS
HEART RATE: 63 BPM | SYSTOLIC BLOOD PRESSURE: 131 MMHG | OXYGEN SATURATION: 99 % | WEIGHT: 165.4 LBS | HEIGHT: 59 IN | DIASTOLIC BLOOD PRESSURE: 67 MMHG | BODY MASS INDEX: 33.34 KG/M2 | RESPIRATION RATE: 18 BRPM

## 2024-05-09 DIAGNOSIS — D61.818 PANCYTOPENIA: ICD-10-CM

## 2024-05-09 DIAGNOSIS — N18.9 CHRONIC KIDNEY DISEASE, UNSPECIFIED CKD STAGE: ICD-10-CM

## 2024-05-09 DIAGNOSIS — E55.9 VITAMIN D DEFICIENCY: ICD-10-CM

## 2024-05-09 DIAGNOSIS — E78.5 HYPERLIPIDEMIA, UNSPECIFIED HYPERLIPIDEMIA TYPE: Chronic | ICD-10-CM

## 2024-05-09 DIAGNOSIS — J43.9 PULMONARY EMPHYSEMA, UNSPECIFIED EMPHYSEMA TYPE: Chronic | ICD-10-CM

## 2024-05-09 DIAGNOSIS — E03.9 HYPOTHYROIDISM, UNSPECIFIED TYPE: Chronic | ICD-10-CM

## 2024-05-09 DIAGNOSIS — R82.90 URINE MALODOR: ICD-10-CM

## 2024-05-09 DIAGNOSIS — R79.9 ABNORMAL FINDING OF BLOOD CHEMISTRY, UNSPECIFIED: ICD-10-CM

## 2024-05-09 DIAGNOSIS — Z12.31 ENCOUNTER FOR SCREENING MAMMOGRAM FOR MALIGNANT NEOPLASM OF BREAST: ICD-10-CM

## 2024-05-09 DIAGNOSIS — R79.9 ABNORMAL BLOOD CHEMISTRY: Primary | ICD-10-CM

## 2024-05-09 DIAGNOSIS — R79.9 ABNORMAL BLOOD CHEMISTRY: ICD-10-CM

## 2024-05-09 DIAGNOSIS — R73.09 ELEVATED GLUCOSE: ICD-10-CM

## 2024-05-09 DIAGNOSIS — N32.81 OAB (OVERACTIVE BLADDER): Chronic | ICD-10-CM

## 2024-05-09 DIAGNOSIS — R82.998 LEUKOCYTES IN URINE: ICD-10-CM

## 2024-05-09 LAB
BASOPHILS # BLD MANUAL: 0 10*3/MM3 (ref 0–0.2)
BASOPHILS NFR BLD MANUAL: 0 % (ref 0–1.5)
BILIRUB BLD-MCNC: NEGATIVE MG/DL
CLARITY, POC: CLEAR
COLOR UR: ABNORMAL
DEPRECATED RDW RBC AUTO: 45.5 FL (ref 37–54)
EOSINOPHIL # BLD MANUAL: 0.25 10*3/MM3 (ref 0–0.4)
EOSINOPHIL NFR BLD MANUAL: 2 % (ref 0.3–6.2)
ERYTHROCYTE [DISTWIDTH] IN BLOOD BY AUTOMATED COUNT: 16.1 % (ref 12.3–15.4)
EXPIRATION DATE: ABNORMAL
GLUCOSE UR STRIP-MCNC: NEGATIVE MG/DL
HBA1C MFR BLD: 6.1 % (ref 4.8–5.6)
HCT VFR BLD AUTO: 34.5 % (ref 34–46.6)
HGB BLD-MCNC: 10.4 G/DL (ref 12–15.9)
KETONES UR QL: NEGATIVE
LEUKOCYTE EST, POC: ABNORMAL
LYMPHOCYTES # BLD MANUAL: 7.07 10*3/MM3 (ref 0.7–3.1)
LYMPHOCYTES NFR BLD MANUAL: 4 % (ref 5–12)
Lab: ABNORMAL
MCH RBC QN AUTO: 23.9 PG (ref 26.6–33)
MCHC RBC AUTO-ENTMCNC: 30.1 G/DL (ref 31.5–35.7)
MCV RBC AUTO: 79.1 FL (ref 79–97)
MONOCYTES # BLD: 0.5 10*3/MM3 (ref 0.1–0.9)
NEUTROPHILS # BLD AUTO: 4.59 10*3/MM3 (ref 1.7–7)
NEUTROPHILS NFR BLD MANUAL: 37 % (ref 42.7–76)
NITRITE UR-MCNC: NEGATIVE MG/ML
NRBC BLD AUTO-RTO: 0 /100 WBC (ref 0–0.2)
PH UR: 6.5 [PH] (ref 5–8)
PLAT MORPH BLD: NORMAL
PLATELET # BLD AUTO: 281 10*3/MM3 (ref 140–450)
PMV BLD AUTO: 11.5 FL (ref 6–12)
PROT UR STRIP-MCNC: NEGATIVE MG/DL
RBC # BLD AUTO: 4.36 10*6/MM3 (ref 3.77–5.28)
RBC # UR STRIP: NEGATIVE /UL
RBC MORPH BLD: NORMAL
SP GR UR: 1.02 (ref 1–1.03)
UROBILINOGEN UR QL: NORMAL
VARIANT LYMPHS NFR BLD MANUAL: 57 % (ref 19.6–45.3)
WBC MORPH BLD: NORMAL
WBC NRBC COR # BLD AUTO: 12.41 10*3/MM3 (ref 3.4–10.8)

## 2024-05-09 PROCEDURE — 82306 VITAMIN D 25 HYDROXY: CPT

## 2024-05-09 PROCEDURE — 87086 URINE CULTURE/COLONY COUNT: CPT | Performed by: PHYSICIAN ASSISTANT

## 2024-05-09 PROCEDURE — 87077 CULTURE AEROBIC IDENTIFY: CPT | Performed by: PHYSICIAN ASSISTANT

## 2024-05-09 PROCEDURE — 87186 SC STD MICRODIL/AGAR DIL: CPT | Performed by: PHYSICIAN ASSISTANT

## 2024-05-09 PROCEDURE — 80053 COMPREHEN METABOLIC PANEL: CPT

## 2024-05-09 PROCEDURE — 80061 LIPID PANEL: CPT

## 2024-05-09 PROCEDURE — 85025 COMPLETE CBC W/AUTO DIFF WBC: CPT

## 2024-05-09 PROCEDURE — 83036 HEMOGLOBIN GLYCOSYLATED A1C: CPT

## 2024-05-09 PROCEDURE — 83540 ASSAY OF IRON: CPT

## 2024-05-09 PROCEDURE — 84443 ASSAY THYROID STIM HORMONE: CPT

## 2024-05-09 PROCEDURE — 85007 BL SMEAR W/DIFF WBC COUNT: CPT

## 2024-05-09 PROCEDURE — 84439 ASSAY OF FREE THYROXINE: CPT

## 2024-05-09 PROCEDURE — 82607 VITAMIN B-12: CPT

## 2024-05-09 PROCEDURE — 36415 COLL VENOUS BLD VENIPUNCTURE: CPT

## 2024-05-09 PROCEDURE — 82728 ASSAY OF FERRITIN: CPT

## 2024-05-09 PROCEDURE — 84466 ASSAY OF TRANSFERRIN: CPT

## 2024-05-09 PROCEDURE — 82746 ASSAY OF FOLIC ACID SERUM: CPT

## 2024-05-09 RX ORDER — ALBUTEROL SULFATE 90 UG/1
2 AEROSOL, METERED RESPIRATORY (INHALATION) EVERY 6 HOURS PRN
Qty: 18 G | Refills: 1 | Status: SHIPPED | OUTPATIENT
Start: 2024-05-09

## 2024-05-09 RX ORDER — OXYBUTYNIN CHLORIDE 10 MG/1
10 TABLET, EXTENDED RELEASE ORAL 2 TIMES DAILY
Qty: 180 TABLET | Refills: 1 | Status: SHIPPED | OUTPATIENT
Start: 2024-05-09

## 2024-05-09 RX ORDER — BUDESONIDE AND FORMOTEROL FUMARATE DIHYDRATE 80; 4.5 UG/1; UG/1
2 AEROSOL RESPIRATORY (INHALATION)
Qty: 10.2 G | Refills: 2 | Status: SHIPPED | OUTPATIENT
Start: 2024-05-09

## 2024-05-09 RX ORDER — SIMVASTATIN 20 MG
20 TABLET ORAL NIGHTLY
Qty: 90 TABLET | Refills: 1 | Status: SHIPPED | OUTPATIENT
Start: 2024-05-09

## 2024-05-09 RX ORDER — LEVOTHYROXINE SODIUM 50 UG/1
50 CAPSULE ORAL DAILY
Qty: 90 CAPSULE | Refills: 1 | Status: SHIPPED | OUTPATIENT
Start: 2024-05-09

## 2024-05-10 DIAGNOSIS — E03.9 ACQUIRED HYPOTHYROIDISM: Primary | ICD-10-CM

## 2024-05-10 LAB
25(OH)D3 SERPL-MCNC: 52.7 NG/ML (ref 30–100)
ALBUMIN SERPL-MCNC: 4.2 G/DL (ref 3.5–5.2)
ALBUMIN/GLOB SERPL: 1.4 G/DL
ALP SERPL-CCNC: 80 U/L (ref 39–117)
ALT SERPL W P-5'-P-CCNC: 58 U/L (ref 1–33)
ANION GAP SERPL CALCULATED.3IONS-SCNC: 9.3 MMOL/L (ref 5–15)
AST SERPL-CCNC: 52 U/L (ref 1–32)
BILIRUB SERPL-MCNC: 0.6 MG/DL (ref 0–1.2)
BUN SERPL-MCNC: 13 MG/DL (ref 8–23)
BUN/CREAT SERPL: 12 (ref 7–25)
CALCIUM SPEC-SCNC: 9.5 MG/DL (ref 8.6–10.5)
CHLORIDE SERPL-SCNC: 104 MMOL/L (ref 98–107)
CHOLEST SERPL-MCNC: 179 MG/DL (ref 0–200)
CO2 SERPL-SCNC: 25.7 MMOL/L (ref 22–29)
CREAT SERPL-MCNC: 1.08 MG/DL (ref 0.57–1)
EGFRCR SERPLBLD CKD-EPI 2021: 56.1 ML/MIN/1.73
FERRITIN SERPL-MCNC: 1156 NG/ML (ref 13–150)
FOLATE SERPL-MCNC: >20 NG/ML (ref 4.78–24.2)
GLOBULIN UR ELPH-MCNC: 3.1 GM/DL
GLUCOSE SERPL-MCNC: 86 MG/DL (ref 65–99)
HDLC SERPL-MCNC: 36 MG/DL (ref 40–60)
IRON 24H UR-MRATE: 62 MCG/DL (ref 37–145)
IRON SATN MFR SERPL: 19 % (ref 20–50)
LDLC SERPL CALC-MCNC: 106 MG/DL (ref 0–100)
LDLC/HDLC SERPL: 2.79 {RATIO}
POTASSIUM SERPL-SCNC: 4.5 MMOL/L (ref 3.5–5.2)
PROT SERPL-MCNC: 7.3 G/DL (ref 6–8.5)
SODIUM SERPL-SCNC: 139 MMOL/L (ref 136–145)
T4 FREE SERPL-MCNC: 1.33 NG/DL (ref 0.93–1.7)
TIBC SERPL-MCNC: 331 MCG/DL (ref 298–536)
TRANSFERRIN SERPL-MCNC: 222 MG/DL (ref 200–360)
TRIGL SERPL-MCNC: 213 MG/DL (ref 0–150)
TSH SERPL DL<=0.05 MIU/L-ACNC: 4.25 UIU/ML (ref 0.27–4.2)
VIT B12 BLD-MCNC: 1557 PG/ML (ref 211–946)
VLDLC SERPL-MCNC: 37 MG/DL (ref 5–40)

## 2024-05-11 LAB — BACTERIA SPEC AEROBE CULT: ABNORMAL

## 2024-05-13 ENCOUNTER — OFFICE VISIT (OUTPATIENT)
Dept: PULMONOLOGY | Facility: CLINIC | Age: 69
End: 2024-05-13
Payer: MEDICARE

## 2024-05-13 ENCOUNTER — LAB (OUTPATIENT)
Dept: LAB | Facility: HOSPITAL | Age: 69
End: 2024-05-13
Payer: MEDICARE

## 2024-05-13 VITALS
RESPIRATION RATE: 18 BRPM | WEIGHT: 165 LBS | HEIGHT: 59 IN | BODY MASS INDEX: 33.26 KG/M2 | SYSTOLIC BLOOD PRESSURE: 134 MMHG | OXYGEN SATURATION: 99 % | HEART RATE: 69 BPM | DIASTOLIC BLOOD PRESSURE: 62 MMHG

## 2024-05-13 DIAGNOSIS — F17.211 CIGARETTE NICOTINE DEPENDENCE IN REMISSION: ICD-10-CM

## 2024-05-13 DIAGNOSIS — R06.09 DYSPNEA ON EXERTION: ICD-10-CM

## 2024-05-13 DIAGNOSIS — J44.1 CHRONIC OBSTRUCTIVE PULMONARY DISEASE WITH ACUTE EXACERBATION: Primary | ICD-10-CM

## 2024-05-13 DIAGNOSIS — J44.1 CHRONIC OBSTRUCTIVE PULMONARY DISEASE WITH ACUTE EXACERBATION: ICD-10-CM

## 2024-05-13 DIAGNOSIS — N30.00 ACUTE CYSTITIS WITHOUT HEMATURIA: Primary | ICD-10-CM

## 2024-05-13 LAB — ALPHA1 GLOB MFR UR ELPH: 154 MG/DL (ref 90–200)

## 2024-05-13 PROCEDURE — 1159F MED LIST DOCD IN RCRD: CPT | Performed by: NURSE PRACTITIONER

## 2024-05-13 PROCEDURE — 1160F RVW MEDS BY RX/DR IN RCRD: CPT | Performed by: NURSE PRACTITIONER

## 2024-05-13 PROCEDURE — 82103 ALPHA-1-ANTITRYPSIN TOTAL: CPT

## 2024-05-13 PROCEDURE — 99204 OFFICE O/P NEW MOD 45 MIN: CPT | Performed by: NURSE PRACTITIONER

## 2024-05-13 PROCEDURE — 36415 COLL VENOUS BLD VENIPUNCTURE: CPT

## 2024-05-13 RX ORDER — AMOXICILLIN AND CLAVULANATE POTASSIUM 875; 125 MG/1; MG/1
1 TABLET, FILM COATED ORAL 2 TIMES DAILY
Qty: 20 TABLET | Refills: 0 | Status: SHIPPED | OUTPATIENT
Start: 2024-05-13

## 2024-05-13 RX ORDER — IPRATROPIUM BROMIDE AND ALBUTEROL SULFATE 2.5; .5 MG/3ML; MG/3ML
3 SOLUTION RESPIRATORY (INHALATION) EVERY 4 HOURS PRN
Qty: 360 ML | Refills: 0 | Status: CANCELLED | OUTPATIENT
Start: 2024-05-13

## 2024-05-13 NOTE — PROGRESS NOTES
Primary Care Provider  Lina Nguyen PA   Referring Provider  Baljinder Haley, DO    Patient Complaint  Establish Care and COPD      SUBJECTIVE    History of Presenting Illness  Veda Peña is a pleasant 68 y.o. female who presents to [facility] for new patient appointment for COPD clinic after hospitalization at [facility] 4/28 through 4/30/2024 for COPD exacerbation, acute bronchitis.  Hospital course includes the following:Veda Peña is a 68 y.o. female with COPD who presented to the hospital complaining of shortness of breath and fever.  SpO2 91% on room air. Temperature 102.9.  WBC 2.53.  Lactate normal.  Chest x-ray no acute process.  Patient became hypoxic with ambulation with SpO2 dropping to 86% and was placed on 2 L of oxygen.  COPD exacerbation/viral bronchitis suspected. She was started on empiric antibiotics, nebulizers, steroids.  CT chest no acute process.  CT abdomen pelvis notable for mild splenomegaly She was noted to be pancytopenic which was new.  She was evaluated by hematology.  Majority of her blood work was unremarkable.  Cell counts were attributed to viral illness and both white count and platelets normalized.  Hemoglobin remained stable.  No signs of bleeding.  Blood cultures negative.  COVID/influenza/RSV negative.  She was feeling better and was weaned to room air. Outpatient referral to pulmonology placed to establish care.  Discussed outpatient follow-up with PCP and hematology.  Discharged home in stable condition.    Patient presents to the office today to follow-up after her hospital visit.  She is a former smoker quit in 2013 with an 80 pack history.  She is currently on Symbicort 2 puffs twice daily, Zyrtec, albuterol inhaler, duo nebulizer.  Overall patient states she is doing very well since her hospitalization.  Patient states she did not know she was as sick as she was when she presented to the  hospital.  She had previously been sick and treated with steroid and antibiotic by PCP.  Patient states that [place] day she went into [place] just felt terrible and went to the hospital.  At present time patient denies dyspnea, coughing, wheezing, headaches, chest pain, weight loss or hemoptysis. Denies fevers, chills and night sweats. Veda Peña is able to perform ADLs without difficulties and denies any swollen glands/lymph nodes in the head or neck.  Patient states she actually mowed her yard yesterday without any difficulty.  She is not on any oxygen.  Patient states she did have a pulmonary function test done many years ago.    I have personally reviewed the review of systems, past family, social, medical and surgical histories; and agree with their findings.    Review of Systems  Constitutional symptoms:  Denied complaints   Ear, nose, throat: Denied complaints  Cardiovascular:  Denied complaints  Respiratory: Denied complaints  Gastrointestinal: Denied complaints  Musculoskeletal: Denied complaints    Family History   Problem Relation Age of Onset    Cancer Mother         Abdominal    Arthritis Mother     COPD Mother     Diabetes Mother     Heart disease Mother         Pacemaker,    Hypertension Mother     Cancer Father         Throat, prostate,  metastatic    Arthritis Father     Hypertension Father     Cancer Sister         Kidney    Cancer Maternal Grandmother         Pancreatic    Arthritis Maternal Grandmother     Diabetes Maternal Grandmother     Hypertension Maternal Grandmother         Stroke    Unknown Other     Unknown Other     Unknown Other     Unknown Other     Unknown Other     Thyroid disease Daughter             Arthritis Daughter     COPD Daughter     Diabetes Daughter     Vision loss Daughter         Glaucoma    Anxiety disorder Daughter         Anxiety-stress    Vision loss Daughter         Has glaucoma    Early death Daughter          Thyroid    Thyroid disease Daughter             Cancer Paternal Grandmother         Leukemia- non hodgkin's lymphoma    Anxiety disorder Daughter         Anxiety    Arthritis Daughter     COPD Daughter     Diabetes Daughter     Cancer Brother         Non- hodgkin's lymphoma    Hypertension Maternal Aunt         Stroke    Learning disabilities Son         ADHD        Social History     Socioeconomic History    Marital status:    Tobacco Use    Smoking status: Former     Current packs/day: 0.00     Average packs/day: 2.0 packs/day for 40.0 years (80.0 ttl pk-yrs)     Types: Cigarettes, Electronic Cigarette     Start date: 3/26/1973     Quit date: 3/26/2013     Years since quittin.1    Smokeless tobacco: Never    Tobacco comments:     Stopped electronic cigarettes on 2022   Vaping Use    Vaping status: Former    Substances: Flavoring    Devices: Pre-filled or refillable cartridge, Refillable tank   Substance and Sexual Activity    Alcohol use: Never    Drug use: Never    Sexual activity: Not Currently     Partners: Male        Past Medical History:   Diagnosis Date    Anemia     Anxiety 2015    Arthritis     Asthma     Emphysema    Cancer -present    Skin    Cataract     Cataract surgery in both eyes    Cholelithiasis     Surgery    CKD (chronic kidney disease)     FOLLOWED BY PCP LAST BUN 16, CREAT 1 AND GFR 61.2023    Colon polyp     Colonoscopy, removed and biopsied    COPD (chronic obstructive pulmonary disease)     Coronary artery disease     FOLLOWED BY DR WELSH . DENIES CP BUT HAS SOA WITH EXERTION CHRONIC ISSUE. DECREASED ACTIVITY D/T BACK PAIN AND SOA    Depression     Diabetes mellitus     Pre- diabetes    Elevated glucose 2021    Fibromyalgia, primary     ?    GERD (gastroesophageal reflux disease)     Headache     History of 2019 novel coronavirus disease (COVID-19) 2021    HL (hearing loss)     Hearing aids    Hyperlipidemia      Hypothyroidism     Irritable bowel syndrome     Constipation..ongoing    Low back pain 2022    Lower back, across hips, and down both legs    Neuropathic pain     Neuropathy     Obesity     Osteopenia     Renal insufficiency     Shortness of breath on exertion         Immunization History   Administered Date(s) Administered    Tdap 01/01/2020       Allergies   Allergen Reactions    Tramadol Nausea And Vomiting    Codeine Palpitations    Diazepam Anxiety    Iron Other (See Comments)     REPORTS CAUSES HER TO HAVE URINARY TRACT INFECTION    Nickel Hives, Swelling and Rash    Prednisone Unknown - Low Severity     REPORTS MAKES HER REALLY ANGRY AND MAD    Venlafaxine Nausea And Vomiting          Current Outpatient Medications:     albuterol sulfate  (90 Base) MCG/ACT inhaler, Inhale 2 puffs Every 6 (Six) Hours As Needed for Wheezing., Disp: 18 g, Rfl: 1    Ascorbic Acid (Vitamin C) 500 MG capsule, Take 1 capsule by mouth Daily., Disp: , Rfl:     aspirin 81 MG EC tablet, Take 1 tablet by mouth Daily., Disp: 90 tablet, Rfl: 3    budesonide-formoterol (Symbicort) 80-4.5 MCG/ACT inhaler, Inhale 2 puffs 2 (Two) Times a Day., Disp: 10.2 g, Rfl: 2    cetirizine (zyrTEC) 10 MG tablet, Take 1 tablet by mouth Daily As Needed for Allergies., Disp: , Rfl:     gabapentin (NEURONTIN) 300 MG capsule, Take 1 capsule by mouth 2 (Two) Times a Day As Needed., Disp: , Rfl:     guaifenesin (ROBITUSSIN) 100 MG/5ML liquid, Take 10 mL by mouth 4 (Four) Times a Day As Needed for Cough., Disp: 118 mL, Rfl: 0    HYDROcodone-acetaminophen (NORCO)  MG per tablet, Take 1 tablet by mouth Every 6 (Six) Hours As Needed for Moderate Pain., Disp: , Rfl:     ipratropium-albuterol (DUO-NEB) 0.5-2.5 mg/3 ml nebulizer, Take 3 mL by nebulization Every 4 (Four) Hours As Needed for Wheezing or Shortness of Air., Disp: 360 mL, Rfl: 0    levothyroxine sodium (TIROSINT) 50 MCG capsule, Take 1 capsule by mouth Daily., Disp: 90 capsule, Rfl: 1     "multivitamin with minerals tablet tablet, Take 1 tablet by mouth Daily., Disp: , Rfl:     oxybutynin XL (DITROPAN-XL) 10 MG 24 hr tablet, Take 1 tablet by mouth 2 (Two) Times a Day., Disp: 180 tablet, Rfl: 1    simvastatin (ZOCOR) 20 MG tablet, Take 1 tablet by mouth Every Night., Disp: 90 tablet, Rfl: 1           Vital Signs   /62 (BP Location: Left arm, Patient Position: Sitting, Cuff Size: Adult)   Pulse 69   Resp 18   Ht 149.9 cm (59\")   Wt 74.8 kg (165 lb)   SpO2 99% Comment: Room air  BMI 33.33 kg/m²       OBJECTIVE    Physical Exam  Vital Signs Reviewed   WDWN, Alert, NAD.    HEENT:  PERRL, EOMI.  OP, nares clear  Neck:  Supple, no JVD, no thyromegaly  Chest:  good aeration, clear to auscultation bilaterally, tympanic to percussion bilaterally, no work of breathing noted  CV: RRR, no MGR, pulses 2+, equal.  Abd:  Soft, NT, ND, + BS, no HSM  EXT:  no clubbing, no cyanosis, no edema  Neuro:  A&Ox3, CN grossly intact, no focal deficits.  Skin: No rashes or lesions noted    Results Review  I have personally reviewed the prior office notes, hospital records, labs, and diagnostics.  CT Chest With Contrast Diagnostic [[id number]] (Order [id number])  Order  Status: Final result     Study Notes     Kameron Whiting on 4/29/2024  5:01 PM EDT   100 cc isovue 370 injected into lt acf  PT STATES COUGHING AND HAVING BELLY PAIN BUT SHE BELIEVES ITS DUE TO HOW MUCH SHE HAS BEEN COUGHING LATELY. PT STATES SHE HAS HX OF COPD - CHF - GERD -  CHART STATES HX OF  pancytopenia most likely because of infection.  THORAX CTDI 4.86  ABD PELV CTDI 10.25  --KAMERON     Appointment Information    PACS Images     Radiology Images  Related Results     CT Abdomen Pelvis With Contrast Final result 4/29/2024          Narrative   CT ABDOMEN PELVIS W CONTRAST-     Date of Exam: 4/29/2024 4:45 PM     Indication: Occult malignancy; B34.9-Viral infection, unspecified;  J96.01-Acute respiratory failure with hypoxia.     Comparison: CT scan of the " chest low-dose dated 12/29/2023     Technique: Axial CT images were obtained of the abdomen and pelvis ...   Impression   Impression:     1. Mild fatty infiltration of the liver.     2. Mild splenomegaly measuring 13.7 cm.           Electronically Signed By-EVELIN DUFFY MD On:4/29/2024 5:22 PM              Study Result    Narrative & Impression   CT CHEST W CONTRAST DIAGNOSTIC-     Date of Exam: 4/29/2024 4:45 PM     Indication: Shortness of breath (Ped 0-17y); B34.9-Viral infection,  unspecified; J96.01-Acute respiratory failure with hypoxia.     Comparison: 12/29/2023     Technique: Axial CT images were obtained of the chest after the  uneventful intravenous administration of nonionic contrast.   Reconstructed coronal and sagittal images were also obtained. Automated  exposure control and iterative construction methods were used.        Findings:  Coronary artery calcification is present. The thoracic aorta has a  normal caliber. No evidence of pleural or pericardial effusion. There is  no mediastinal, axillary or hilar adenopathy. There is slight motion  artifact. There is stable calcified and noncalcified pulmonary  nodules  measuring up to 5 mm. No acute parenchymal consolidations are  identified. Degenerative changes are present in the thoracic spine.     IMPRESSION:  Impression:  Stable exam. No acute findings.                 ASSESSMENT         Patient Active Problem List   Diagnosis    Chronic kidney disease    COPD (chronic obstructive pulmonary disease)    Elevated glucose    GERD (gastroesophageal reflux disease)    Mixed hyperlipidemia    Hypothyroidism    Neuropathy    DDD (degenerative disc disease), cervical    Primary localized osteoarthrosis of right lower leg    Right knee pain    Fibromyalgia    Low back pain    Osteoarthritis of right knee    Class 1 obesity due to excess calories with body mass index (BMI) of 32.0 to 32.9 in adult    Non-occlusive coronary artery disease    COPD (chronic  obstructive pulmonary disease) with acute bronchitis    Chronic respiratory failure       Encounter Diagnoses   Name Primary?    Chronic obstructive pulmonary disease with acute exacerbation Yes    Cigarette nicotine dependence in remission     Dyspnea on exertion       PLAN  -Alpha 1 antitrypsin ordered today  -Pulmonary function test ordered today  -Patient to continue with Symbicort, albuterol, DuoNeb which are prescribed by her PCP   -Patient will follow back up in 6 to 8 weeks or sooner if needed  Diagnoses and all orders for this visit:    1. Chronic obstructive pulmonary disease with acute exacerbation (Primary)  -     Complete PFT - Pre & Post Bronchodilator; Future  -     Cancel: Alpha - 1 - Antitrypsin; Future  -     Alpha - 1 - Antitrypsin; Future    2. Cigarette nicotine dependence in remission  -     Complete PFT - Pre & Post Bronchodilator; Future  -     Cancel: Alpha - 1 - Antitrypsin; Future  -     Alpha - 1 - Antitrypsin; Future    3. Dyspnea on exertion  -     Complete PFT - Pre & Post Bronchodilator; Future  -     Cancel: Alpha - 1 - Antitrypsin; Future  -     Alpha - 1 - Antitrypsin; Future           Smoking status: Former quit in 2013 with 80-pack-year history  Vaccination status: Reviewed  Medications personally reviewed    Follow Up  Return in about 6 weeks (around 6/24/2024).    Patient was given instructions and counseling regarding her condition or for health maintenance advice. Please see specific information pulled into the AVS if appropriate.      I spent 20 minutes caring for Veda Peña on this date of service. This time includes time spent by me in the following activities:preparing for the visit, reviewing tests, obtaining and/or reviewing a separately obtained history, performing a medically appropriate examination and/or evaluation, counseling and educating the patient/family/caregiver, ordering medications, tests, or procedures, documenting information in the medical record,  independently interpreting results and communicating that information with the patient/family/caregiver and answered questions family members, discuss medications.

## 2024-05-22 ENCOUNTER — READMISSION MANAGEMENT (OUTPATIENT)
Dept: CALL CENTER | Facility: HOSPITAL | Age: 69
End: 2024-05-22
Payer: MEDICARE

## 2024-05-22 NOTE — OUTREACH NOTE
COPD/PN Week 3 Survey      Flowsheet Row Responses   [facility] patient discharged from? [facility]   Does the patient have one of the following disease processes/diagnoses(primary or secondary)? COPD   Week 3 attempt successful? No   Unsuccessful attempts Attempt 1            Dulce ROSA - Licensed Nurse

## 2024-05-24 LAB
HFE GENE MUT ANL BLD/T: NORMAL
IMP & REVIEW OF LAB RESULTS: NORMAL

## 2024-05-31 ENCOUNTER — TELEPHONE (OUTPATIENT)
Dept: CARDIOLOGY | Facility: CLINIC | Age: 69
End: 2024-05-31
Payer: MEDICARE

## 2024-05-31 NOTE — TELEPHONE ENCOUNTER
[facility] received a fax that requires your attention. The document has been indexed to the patient’s chart for your review.      Reason for sending: EXTERNAL MEDICAL RECORD NOTIFICATION     Documents Description: EXTMEDREC; MED REC [id number]    Name of Sender: [employer]     Date Indexed: 5.31.24    WANTING MOST RECENT MED LIST & LAST OV & LABS

## 2024-06-03 ENCOUNTER — TELEPHONE (OUTPATIENT)
Dept: FAMILY MEDICINE CLINIC | Facility: CLINIC | Age: 69
End: 2024-06-03
Payer: MEDICARE

## 2024-06-03 NOTE — TELEPHONE ENCOUNTER
Patient can go ahead and get her thyroid labs done. In regards to ordering a u/a, patient needs an appointment and I am out of the office tomorrow or I would work her in. We can see if any of the other providers have availability tomorrow.

## 2024-06-03 NOTE — TELEPHONE ENCOUNTER
Caller: Veda Peña    Relationship: Self    Best call back number: [phone]    What orders are you requesting (i.e. lab or imaging): URINE TEST    In what timeframe would the patient need to come in: BY TOMORROW     Where [place] you [place] lab[place]     Additional notes:        THE PATIENT SAID SHE THINK HER UTI HAS COME BACK AND IS REQUESTING PCP TO SEND IN AN ORDER FOR  A URINE CULTURE            THE PATIENT IS WANTING TO KNOW IF SHE CAN GET HER LABS DONE  TOMORROW SINCE SHE IS GOING TO PAIN  MANAGEMENT IN [place]  TOMORROW OR DO SHE NEED TO WAIT       PLEASE CALL AND ADVISE

## 2024-06-04 ENCOUNTER — OFFICE VISIT (OUTPATIENT)
Dept: FAMILY MEDICINE CLINIC | Facility: CLINIC | Age: 69
End: 2024-06-04
Payer: MEDICARE

## 2024-06-04 ENCOUNTER — LAB (OUTPATIENT)
Dept: LAB | Facility: HOSPITAL | Age: 69
End: 2024-06-04
Payer: MEDICARE

## 2024-06-04 VITALS
HEIGHT: 59 IN | BODY MASS INDEX: 33.1 KG/M2 | HEART RATE: 50 BPM | OXYGEN SATURATION: 95 % | WEIGHT: 164.2 LBS | SYSTOLIC BLOOD PRESSURE: 123 MMHG | DIASTOLIC BLOOD PRESSURE: 49 MMHG

## 2024-06-04 DIAGNOSIS — E03.9 ACQUIRED HYPOTHYROIDISM: ICD-10-CM

## 2024-06-04 DIAGNOSIS — Z87.440 HISTORY OF RECURRENT UTIS: ICD-10-CM

## 2024-06-04 DIAGNOSIS — Z87.440 HISTORY OF UTI: Primary | ICD-10-CM

## 2024-06-04 DIAGNOSIS — R30.0 DYSURIA: ICD-10-CM

## 2024-06-04 LAB
BILIRUB BLD-MCNC: NEGATIVE MG/DL
CLARITY, POC: CLEAR
COLOR UR: YELLOW
EXPIRATION DATE: NORMAL
GLUCOSE UR STRIP-MCNC: NEGATIVE MG/DL
KETONES UR QL: NEGATIVE
LEUKOCYTE EST, POC: NEGATIVE
Lab: NORMAL
NITRITE UR-MCNC: NEGATIVE MG/ML
PH UR: 5.5 [PH] (ref 5–8)
PROT UR STRIP-MCNC: NEGATIVE MG/DL
RBC # UR STRIP: NEGATIVE /UL
SP GR UR: 1.03 (ref 1–1.03)
T4 FREE SERPL-MCNC: 1.06 NG/DL (ref 0.92–1.68)
TSH SERPL DL<=0.05 MIU/L-ACNC: 3.57 UIU/ML (ref 0.27–4.2)
UROBILINOGEN UR QL: NORMAL

## 2024-06-04 PROCEDURE — 87186 SC STD MICRODIL/AGAR DIL: CPT | Performed by: STUDENT IN AN ORGANIZED HEALTH CARE EDUCATION/TRAINING PROGRAM

## 2024-06-04 PROCEDURE — 84439 ASSAY OF FREE THYROXINE: CPT

## 2024-06-04 PROCEDURE — 99213 OFFICE O/P EST LOW 20 MIN: CPT | Performed by: STUDENT IN AN ORGANIZED HEALTH CARE EDUCATION/TRAINING PROGRAM

## 2024-06-04 PROCEDURE — 36415 COLL VENOUS BLD VENIPUNCTURE: CPT

## 2024-06-04 PROCEDURE — 84443 ASSAY THYROID STIM HORMONE: CPT

## 2024-06-04 PROCEDURE — 3044F HG A1C LEVEL LT 7.0%: CPT | Performed by: STUDENT IN AN ORGANIZED HEALTH CARE EDUCATION/TRAINING PROGRAM

## 2024-06-04 PROCEDURE — 87077 CULTURE AEROBIC IDENTIFY: CPT | Performed by: STUDENT IN AN ORGANIZED HEALTH CARE EDUCATION/TRAINING PROGRAM

## 2024-06-04 PROCEDURE — 1125F AMNT PAIN NOTED PAIN PRSNT: CPT | Performed by: STUDENT IN AN ORGANIZED HEALTH CARE EDUCATION/TRAINING PROGRAM

## 2024-06-04 PROCEDURE — 81003 URINALYSIS AUTO W/O SCOPE: CPT | Performed by: STUDENT IN AN ORGANIZED HEALTH CARE EDUCATION/TRAINING PROGRAM

## 2024-06-04 PROCEDURE — 87086 URINE CULTURE/COLONY COUNT: CPT | Performed by: STUDENT IN AN ORGANIZED HEALTH CARE EDUCATION/TRAINING PROGRAM

## 2024-06-04 NOTE — PROGRESS NOTES
Subjective:       Veda Peña is a 69 y.o. female who presents for dysuria.    Ms. Peña is normally seen by her PCP, my colleague Lina Nguyen.  I am seeing her for acute sick visit to discuss dysuria      Ms. Peña presents today to discuss dysuria.     She was recently treated for urinary tract infection.  She saw PCP on 5/9/2024.  Urine culture was obtained and demonstrated greater than 100,000 colony-forming units of Enterococcus faecalis.      Based on susceptibilities, Augmentin was sent in.  Patient completed treatment with resolution of symptoms.     Symptoms recently returned, prompting her to seek treatment today.  She reports dysuria, difficulty urinating, decreased urination, increased urinary frequency, and urgency.  She denies systemic signs of infection and vital signs are stable for me.  Does not have CVA tenderness on physical exam.    POC urinalysis was obtained and was negative - will send for culture.  Will treat based on susceptibilities.  If negative due to recent antibiotic treatment, would proceed to treatment likely with Macrobid.    This is third UTI this year. Had 6-8 UTIs last year. Would refer to Urology for further workup.  She is on oxybutynin for overactive bladder and if there is any potential that is causing urinary retention or stagnation of the urine it might predispose her to urinary tract infections.  It could be worth trialing an alternative agent such as Gemtesa.      The following portions of the patient's history were reviewed and updated as appropriate: allergies, current medications, past family history, past medical history, past social history, past surgical history, and problem list.    Past Medical Hx:  Past Medical History:   Diagnosis Date    Anemia 2021    Anxiety 2015    Arthritis     Asthma 2021    Emphysema    Cancer 2021-present    Skin    Cataract 2020    Cataract surgery in both eyes    Cholelithiasis     Surgery    CKD (chronic kidney disease)      FOLLOWED BY PCP LAST BUN 16, CREAT 1 AND GFR 61.5 NOV 2023    Colon polyp     Colonoscopy, removed and biopsied    COPD (chronic obstructive pulmonary disease)     Coronary artery disease     FOLLOWED BY DR WELSH . DENIES CP BUT HAS SOA WITH EXERTION CHRONIC ISSUE. DECREASED ACTIVITY D/T BACK PAIN AND SOA    Depression     Diabetes mellitus 2023    Pre- diabetes    Elevated glucose 04/14/2021    Fibromyalgia, primary     ?    GERD (gastroesophageal reflux disease)     Headache 2020    History of 2019 novel coronavirus disease (COVID-19) 04/14/2021    HL (hearing loss) 2021    Hearing aids    Hyperlipidemia     Hypothyroidism     Irritable bowel syndrome     Constipation..ongoing    Low back pain 2022    Lower back, across hips, and down both legs    Neuropathic pain     Neuropathy     Obesity     Osteopenia     Peptic ulceration     Renal insufficiency     Scoliosis 2023    Awaiting decompression surgery    Shortness of breath on exertion     Stroke        Past Surgical Hx:  Past Surgical History:   Procedure Laterality Date    CARDIAC CATHETERIZATION N/A 11/17/2023    Procedure: Left Heart Cath with possible angioplasty;  Surgeon: Hema Welsh MD;  Location: [facility] CATH INVASIVE LOCATION;  Service: Cardiovascular;  Laterality: N/A;    CHOLECYSTECTOMY      EYE SURGERY      cataract surgery of both eyes     HYSTERECTOMY      KNEE ACL RECONSTRUCTION Right     LUMBAR LAMINECTOMY Left 1/12/2024    Procedure: MINIMALLY INVASIVE LUMBAR LAMINECTOMY, left approach, lumbar 3-lumbar 4;  Surgeon: Dirk Vizcaino MD;  Location: [facility] MAIN OR;  Service: Neurosurgery;  Laterality: Left;    TUBAL ABDOMINAL LIGATION         Current Meds:    Current Outpatient Medications:     albuterol sulfate  (90 Base) MCG/ACT inhaler, Inhale 2 puffs Every 6 (Six) Hours As Needed for Wheezing., Disp: 18 g, Rfl: 1    Ascorbic Acid (Vitamin C) 500 MG capsule, Take 1 capsule by mouth Daily., Disp: , Rfl:     aspirin 81 MG EC tablet,  Take 1 tablet by mouth Daily., Disp: 90 tablet, Rfl: 3    budesonide-formoterol (Symbicort) 80-4.5 MCG/ACT inhaler, Inhale 2 puffs 2 (Two) Times a Day., Disp: 10.2 g, Rfl: 2    cetirizine (zyrTEC) 10 MG tablet, Take 1 tablet by mouth Daily As Needed for Allergies., Disp: , Rfl:     gabapentin (NEURONTIN) 300 MG capsule, Take 1 capsule by mouth 2 (Two) Times a Day As Needed., Disp: , Rfl:     HYDROcodone-acetaminophen (NORCO)  MG per tablet, Take 1 tablet by mouth Every 6 (Six) Hours As Needed for Moderate Pain., Disp: , Rfl:     ipratropium-albuterol (DUO-NEB) 0.5-2.5 mg/3 ml nebulizer, Take 3 mL by nebulization Every 4 (Four) Hours As Needed for Wheezing or Shortness of Air., Disp: 360 mL, Rfl: 0    levothyroxine sodium (TIROSINT) 50 MCG capsule, Take 1 capsule by mouth Daily., Disp: 90 capsule, Rfl: 1    multivitamin with minerals tablet tablet, Take 1 tablet by mouth Daily., Disp: , Rfl:     oxybutynin XL (DITROPAN-XL) 10 MG 24 hr tablet, Take 1 tablet by mouth 2 (Two) Times a Day., Disp: 180 tablet, Rfl: 1    simvastatin (ZOCOR) 20 MG tablet, Take 1 tablet by mouth Every Night., Disp: 90 tablet, Rfl: 1    amoxicillin-clavulanate (AUGMENTIN) 875-125 MG per tablet, Take 1 tablet by mouth 2 (Two) Times a Day., Disp: 20 tablet, Rfl: 0    guaifenesin (ROBITUSSIN) 100 MG/5ML liquid, Take 10 mL by mouth 4 (Four) Times a Day As Needed for Cough., Disp: 118 mL, Rfl: 0    Allergies:  Allergies   Allergen Reactions    Tramadol Nausea And Vomiting    Codeine Palpitations    Diazepam Anxiety    Iron Other (See Comments)     REPORTS CAUSES HER TO HAVE URINARY TRACT INFECTION    Nickel Hives, Swelling and Rash    Prednisone Unknown - Low Severity     REPORTS MAKES HER REALLY ANGRY AND MAD    Venlafaxine Nausea And Vomiting       Family Hx:  Family History   Problem Relation Age of Onset    Cancer Mother         Abdominal    Arthritis Mother     COPD Mother     Diabetes Mother     Heart disease Mother         Pacemaker,     Hypertension Mother     Cancer Father         Throat, prostate,  metastatic    Arthritis Father     Hypertension Father     Cancer Sister         Kidney    Cancer Maternal Grandmother         Pancreatic    Arthritis Maternal Grandmother     Diabetes Maternal Grandmother     Hypertension Maternal Grandmother         Stroke    Unknown Other     Unknown Other     Unknown Other     Unknown Other     Unknown Other     Thyroid disease Daughter             Arthritis Daughter     COPD Daughter     Diabetes Daughter     Vision loss Daughter         Glaucoma    Anxiety disorder Daughter         Anxiety-stress    Vision loss Daughter         Has glaucoma    Early death Daughter         Thyroid    Thyroid disease Daughter             Cancer Paternal Grandmother         Leukemia- non hodgkin's lymphoma    Anxiety disorder Daughter         Anxiety    Arthritis Daughter     COPD Daughter     Diabetes Daughter     Cancer Brother         Non- hodgkin's lymphoma    Hypertension Maternal Aunt         Stroke    Learning disabilities Son         ADHD        Social History:  Social History     Socioeconomic History    Marital status:    Tobacco Use    Smoking status: Former     Current packs/day: 0.00     Average packs/day: 2.0 packs/day for 40.0 years (80.0 ttl pk-yrs)     Types: Cigarettes, Electronic Cigarette     Start date: 3/26/1973     Quit date: 3/26/2013     Years since quittin.2    Smokeless tobacco: Never    Tobacco comments:     Stopped electronic cigarettes on 2022   Vaping Use    Vaping status: Former    Substances: Flavoring    Devices: Pre-filled or refillable cartridge, Refillable tank   Substance and Sexual Activity    Alcohol use: Never    Drug use: Never    Sexual activity: Not Currently     Partners: Male       Review of Systems  Review of Systems   Constitutional:  Negative for chills.   Gastrointestinal:  Negative for nausea and vomiting.   Genitourinary:  Positive for  "decreased urine volume, difficulty urinating, dysuria, frequency and urgency. Negative for flank pain and hematuria.       Objective:     /49 (BP Location: Left arm, Patient Position: Sitting, Cuff Size: Adult)   Pulse 50   Ht 149.9 cm (59\")   Wt 74.5 kg (164 lb 3.2 oz)   SpO2 95%   BMI 33.16 kg/m²   Physical Exam  Constitutional:       General: She is not in acute distress.     Appearance: Normal appearance. She is not ill-appearing, toxic-appearing or diaphoretic.   Pulmonary:      Effort: Pulmonary effort is normal. No respiratory distress.   Abdominal:      Tenderness: There is no right CVA tenderness or left CVA tenderness.   Neurological:      Mental Status: She is alert.   Psychiatric:         Mood and Affect: Mood normal.         Behavior: Behavior normal.          Assessment/Plan:     Diagnoses and all orders for this visit:    1. History of UTI (Primary)    Ms. Peña presents today to discuss dysuria.     She was recently treated for urinary tract infection.  She saw PCP on 5/9/2024.  Urine culture was obtained and demonstrated greater than 100,000 colony-forming units of Enterococcus faecalis.      Based on susceptibilities, Augmentin was sent in.  Patient completed treatment with resolution of symptoms.     Symptoms recently returned, prompting her to seek treatment today.  She reports dysuria, difficulty urinating, decreased urination, increased urinary frequency, and urgency.  She denies systemic signs of infection and vital signs are stable for me.  Does not have CVA tenderness on physical exam.    POC urinalysis was obtained and was negative -         Will send for culture.  Will treat based on susceptibilities.  If negative due to recent antibiotic treatment, would proceed to treatment likely with Macrobid based on strength of classic symptoms.    This is third UTI this year. Had 6-8 UTIs last year. Would refer to Urology for further workup.  She is on oxybutynin for overactive bladder " and if there is any potential that is causing urinary retention or stagnation of the urine it might predispose her to urinary tract infections.  It could be worth trialing an alternative agent such as Gemtesa.    -     POCT urinalysis dipstick, automated  -     Urine Culture - Urine, Urine, Clean Catch; Future  -     Urine Culture - Urine, Urine, Clean Catch    2. Dysuria  -     POCT urinalysis dipstick, automated  -     Urine Culture - Urine, Urine, Clean Catch; Future  -     Urine Culture - Urine, Urine, Clean Catch    3. History of recurrent UTIs  -     Ambulatory Referral to Urology          Rx changes: None today, plan to initiate antibiotic treatment based on culture susceptibilities    Follow-up:     Return if symptoms worsen or fail to improve.    Preventative:  Health Maintenance   Topic Date Due    ZOSTER VACCINE (1 of 2) Never done    DXA SCAN  11/29/2023    Pneumococcal Vaccine 65+ (1 of 2 - PCV) 06/08/2024 (Originally 5/28/1961)    COVID-19 Vaccine (1 - 2023-24 season) 06/24/2024 (Originally 9/1/2023)    RSV Vaccine - Adults (1 - 1-dose 60+ series) 04/04/2025 (Originally 5/28/2015)    INFLUENZA VACCINE  08/01/2024    BMI FOLLOWUP  08/01/2024    MAMMOGRAM  02/27/2025    LUNG CANCER SCREENING  04/29/2025    ANNUAL WELLNESS VISIT  05/09/2025    LIPID PANEL  05/09/2025    COLORECTAL CANCER SCREENING  09/23/2025    TDAP/TD VACCINES (2 - Td or Tdap) 01/01/2030    HEPATITIS C SCREENING  Completed         This document has been electronically signed by Ricardo Carrion MD on June 4, 2024 11:15 EDT       Parts of this note are electronic transcriptions/translations of spoken language to printed text using the Dragon Dictation system.

## 2024-06-06 DIAGNOSIS — B95.2 ENTEROCOCCUS UTI: Primary | ICD-10-CM

## 2024-06-06 DIAGNOSIS — N39.0 ENTEROCOCCUS UTI: Primary | ICD-10-CM

## 2024-06-06 LAB — BACTERIA SPEC AEROBE CULT: ABNORMAL

## 2024-06-06 RX ORDER — GRANULES FOR ORAL 3 G/1
3 POWDER ORAL ONCE
Qty: 3 G | Refills: 0 | Status: SHIPPED | OUTPATIENT
Start: 2024-06-06 | End: 2024-06-06

## 2024-06-06 NOTE — PROGRESS NOTES
I have reviewed results of urine culture.  She is growing greater than 100,000 colony-forming units of Enterococcus faecalis.  Although this is susceptible to ampicillin, she previously was treated for Enterococcus faecalis UTI last month by PCP with beta-lactam and did not improve.  Normally at this point I would send in Macrobid however, due to decreased renal function will send in fosfomycin instead after reviewing current guidelines.  I play specific instructions with pharmacy to discuss use with patient.  Can you let her know I am sending this prescription in?    Thank you,    Ricardo Carrion    ?  This document has been electronically signed by Ricardo Carrion MD on June 6, 2024 11:33 EDT

## 2024-06-06 NOTE — PROGRESS NOTES
Urine culture confirms the presence of a new UTI with greater than 100,000 colony-forming units of gram-positive cocci.  It is possible she has another Enterococcus infection in which case I would likely treat her with fosfomycin, as she has already been treated with Augmentin by PCP for her last enterococcal UTI, and because she has decreased renal function (otherwise I would use Macrobid).  Can we contact patient and let her know I am waiting on species and susceptibility to be identified and then will send in appropriate antibiotics?    Thank you,    Ricardo Carrion    ?  This document has been electronically signed by Ricardo Carrion MD on June 6, 2024 08:07 EDT

## 2024-06-07 ENCOUNTER — TELEPHONE (OUTPATIENT)
Dept: FAMILY MEDICINE CLINIC | Facility: CLINIC | Age: 69
End: 2024-06-07
Payer: MEDICARE

## 2024-06-07 NOTE — TELEPHONE ENCOUNTER
Caller: Veda Peña    Relationship to patient: Self    Best call back number: [phone]    Patient is needing: PATIENT'S fosfomycin (MONUROL) 3 g pack ()  IS NEEDING AUTHORIZATION

## 2024-06-10 DIAGNOSIS — B95.2 ENTEROCOCCUS UTI: Primary | ICD-10-CM

## 2024-06-10 DIAGNOSIS — N39.0 ENTEROCOCCUS UTI: Primary | ICD-10-CM

## 2024-06-10 NOTE — TELEPHONE ENCOUNTER
I have reviewed the chart.  Please set up a peer to peer.    Patient has UTI with Enterococcus.  Susceptibilities show susceptibility to beta-lactam, but she is already failed treatment with a beta-lactam.  Susceptibilities show susceptibility to Macrobid, but she is already failed treatment with Macrobid.  Susceptibilities show susceptibility to Levaquin, but she has side effects to Levaquin with multiple medications she is currently on that would preclude this class from being used without medication adjustment.  I reviewed the guidelines for Enterococcus faecalis UTIs and they recommend amoxicillin, Macrobid, and fosfomycin as the preferred oral agents for Enterococcus faecalis UTIs.  Since she has not been able to take treatment with the first two, I prescribed fosfomycin but this has been denied by insurance.  In order to make sure patient gets guideline directed treatment, I would do a peer to peer if we can get this set up for me please    Thank you,    Ricardo Carrion      This document has been electronically signed by Ricardo Carrion MD on Temi 10, 2024 09:33 EDT

## 2024-06-11 ENCOUNTER — TELEPHONE (OUTPATIENT)
Dept: FAMILY MEDICINE CLINIC | Facility: CLINIC | Age: 69
End: 2024-06-11
Payer: MEDICARE

## 2024-06-11 NOTE — TELEPHONE ENCOUNTER
[facility] [address] [phone] [facility] [phone]     Relationship: Pharmacy    Best call back number: [phone]    What is the best time to reach you: ANYTIME    Who are you requesting to speak with (clinical staff, provider,  specific staff member): YOGESH ACEVEDO    What was the call regarding: THEODORE FLORES AT [facility] CALLED REGARDING A DENIED PRIOR AUTHORIZATION FOR PATIENTS   fosfomycin (MONUROL) 3 g pack [20224]

## 2024-06-12 NOTE — TELEPHONE ENCOUNTER
CALLED SPOKE WITH THEODORE STATES PHARMACY NEEDS TO BILL  A DIFF MANUFACTURE SO WE CAN RESUBMIT A PA. SHE STATES PT INSURANCE DENIED FIRST PA BC INSURANCE DOES NOT ALLOW MANUFACTURE.     CALLED PHARMACY SPOKE WITH PHARMACIST GAVE  [facility] # [id number] GIVEN MY THEODORE AS IT IS A DIFFERENT MAKER. PER PHARMACY AND THEODORE A NEW PA WILL HAVE TO BE STARTED. ONCE I HAVE THE PA ON COVER MY MEDS WILL RESUBMIT

## 2024-06-12 NOTE — TELEPHONE ENCOUNTER
PA RE- SUBMITTED Outcome  Approved today  PA Case: [id number], Status: Approved, Coverage Starts on: 3/13/2024 12:00:00 AM, Coverage Ends on: 6/12/2025 12:00:00 AM.  Authorization Expiration Date: 6/11/2025 PT NOTIFIED PHARMACY NOTIFIED

## 2024-06-12 NOTE — TELEPHONE ENCOUNTER
Caller: THEODORE    Relationship to patient: [facility]    Best call back number: [phone]     Patient is needing: THEODORE IS CALLING BACK REGARDING PRIOR AUTHORIZATION FOR FOSFOMYCIN. THEODORE STATES THAT THIS IS HER 3RD ATTEMPT AND WILL CLOSE CASE AT END OF DAY. THEODORE WILL BE AVAILABLE UNTIL 3:30 PM TODAY. PLEASE ADVISE.

## 2024-06-13 NOTE — TELEPHONE ENCOUNTER
Notified by staff that medication for UTI was approved yesterday while I was off duty and that patient has been notified.       This document has been electronically signed by Ricardo Carrion MD on June 13, 2024 08:47 EDT

## 2024-06-25 ENCOUNTER — HOSPITAL ENCOUNTER (OUTPATIENT)
Dept: RESPIRATORY THERAPY | Facility: HOSPITAL | Age: 69
Discharge: HOME | End: 2024-06-25
Payer: MEDICARE

## 2024-06-25 ENCOUNTER — HOSPITAL ENCOUNTER (OUTPATIENT)
Dept: MAMMOGRAPHY | Facility: HOSPITAL | Age: 69
Discharge: HOME | End: 2024-06-25
Payer: MEDICARE

## 2024-06-25 DIAGNOSIS — J44.1 CHRONIC OBSTRUCTIVE PULMONARY DISEASE WITH ACUTE EXACERBATION: ICD-10-CM

## 2024-06-25 DIAGNOSIS — R06.09 DYSPNEA ON EXERTION: ICD-10-CM

## 2024-06-25 DIAGNOSIS — F17.211 CIGARETTE NICOTINE DEPENDENCE IN REMISSION: ICD-10-CM

## 2024-06-25 DIAGNOSIS — Z12.31 ENCOUNTER FOR SCREENING MAMMOGRAM FOR MALIGNANT NEOPLASM OF BREAST: ICD-10-CM

## 2024-06-25 PROCEDURE — 94060 EVALUATION OF WHEEZING: CPT

## 2024-06-25 PROCEDURE — 94729 DIFFUSING CAPACITY: CPT

## 2024-06-25 PROCEDURE — 77067 SCR MAMMO BI INCL CAD: CPT

## 2024-06-25 PROCEDURE — 77063 BREAST TOMOSYNTHESIS BI: CPT

## 2024-06-25 PROCEDURE — 94726 PLETHYSMOGRAPHY LUNG VOLUMES: CPT

## 2024-06-25 RX ORDER — ALBUTEROL SULFATE 2.5 MG/3ML
2.5 SOLUTION RESPIRATORY (INHALATION) ONCE
Status: COMPLETED | OUTPATIENT
Start: 2024-06-25 | End: 2024-06-25

## 2024-06-25 RX ADMIN — ALBUTEROL SULFATE 2.5 MG: 2.5 SOLUTION RESPIRATORY (INHALATION) at 13:34

## 2024-07-16 ENCOUNTER — OFFICE VISIT (OUTPATIENT)
Dept: PULMONOLOGY | Facility: CLINIC | Age: 69
End: 2024-07-16
Payer: MEDICARE

## 2024-07-16 VITALS
HEIGHT: 59 IN | SYSTOLIC BLOOD PRESSURE: 119 MMHG | RESPIRATION RATE: 14 BRPM | DIASTOLIC BLOOD PRESSURE: 78 MMHG | WEIGHT: 161 LBS | BODY MASS INDEX: 32.46 KG/M2 | OXYGEN SATURATION: 91 % | TEMPERATURE: 96.4 F | HEART RATE: 59 BPM

## 2024-07-16 DIAGNOSIS — F17.211 CIGARETTE NICOTINE DEPENDENCE IN REMISSION: Primary | ICD-10-CM

## 2024-07-16 DIAGNOSIS — R06.09 DYSPNEA ON EXERTION: ICD-10-CM

## 2024-07-16 DIAGNOSIS — J44.9 CHRONIC OBSTRUCTIVE PULMONARY DISEASE, UNSPECIFIED COPD TYPE: ICD-10-CM

## 2024-07-16 PROCEDURE — 99214 OFFICE O/P EST MOD 30 MIN: CPT | Performed by: NURSE PRACTITIONER

## 2024-07-16 PROCEDURE — 1160F RVW MEDS BY RX/DR IN RCRD: CPT | Performed by: NURSE PRACTITIONER

## 2024-07-16 PROCEDURE — 1159F MED LIST DOCD IN RCRD: CPT | Performed by: NURSE PRACTITIONER

## 2024-07-16 RX ORDER — GRANULES FOR ORAL 3 G/1
POWDER ORAL
COMMUNITY
End: 2024-07-16

## 2024-07-16 NOTE — PROGRESS NOTES
Primary Care Provider  Lina Nguyen PA   Referring Provider  No ref. provider found    Patient Complaint  Follow-up, Cough, Wheezing, Shortness of Breath (On exertion), and Results (PFT)      SUBJECTIVE    History of Presenting Illness  Veda Peña is a pleasant 69 y.o. female who presents to [facility] for for follow-up appointment.  I last saw the patient 5/13/2024 in the COPD clinic as a new patient.  Patient has a diagnosis of COPD with hypoxia.  She is currently not on oxygen at this time.  Her maintenance inhaler is Symbicort and she has albuterol DuoNeb as needed for shortness of air or wheeze.  She also takes daily Zyrtec for allergy symptoms.  Her alpha-1 antitrypsin result was normal no phenotype available at this time.  Pulmonary function test shows no evidence of obstruction or restriction however air trapping is present and diffusion capacity is mildly reduced.    She does have shortness of air with exertional activity such as working out in her yard mowing.  She recovers easily with albuterol inhaler and rest.  States she hardly ever uses her duo nebulizer.  At present time she denies dyspnea, coughing, wheezing, headaches, chest pain, weight loss or hemoptysis. Denies fevers, chills and night sweats. Veda Peña is able to perform ADLs without difficulties and denies any swollen glands/lymph nodes in the head or neck.    I have personally reviewed the review of systems, past family, social, medical and surgical histories; and agree with their findings.    Review of Systems  Constitutional symptoms:  Denied complaints   Ear, nose, throat: Denied complaints  Cardiovascular:  Denied complaints  Respiratory: Denied complaints  Gastrointestinal: Denied complaints  Musculoskeletal: Denied complaints    Family History   Problem Relation Age of Onset    Cancer Mother         Abdominal    Arthritis Mother     COPD Mother     Diabetes Mother      Heart disease Mother         Pacemaker,    Hypertension Mother     Cancer Father         Throat, prostate,  metastatic    Arthritis Father     Hypertension Father     Cancer Sister         Kidney    Cancer Maternal Grandmother         Pancreatic    Arthritis Maternal Grandmother     Diabetes Maternal Grandmother     Hypertension Maternal Grandmother         Stroke    Unknown Other     Unknown Other     Unknown Other     Unknown Other     Unknown Other     Thyroid disease Daughter             Arthritis Daughter     COPD Daughter     Diabetes Daughter     Vision loss Daughter         Glaucoma    Anxiety disorder Daughter         Anxiety-stress    Vision loss Daughter         Has glaucoma    Early death Daughter         Thyroid    Thyroid disease Daughter             Cancer Paternal Grandmother         Leukemia- non hodgkin's lymphoma    Anxiety disorder Daughter         Anxiety    Arthritis Daughter     COPD Daughter     Diabetes Daughter     Cancer Brother         Non- hodgkin's lymphoma    Hypertension Maternal Aunt         Stroke    Learning disabilities Son         ADHD        Social History     Socioeconomic History    Marital status:    Tobacco Use    Smoking status: Former     Current packs/day: 0.00     Average packs/day: 2.0 packs/day for 40.0 years (80.0 ttl pk-yrs)     Types: Cigarettes, Electronic Cigarette     Start date: 3/26/1973     Quit date: 3/26/2013     Years since quittin.3    Smokeless tobacco: Never    Tobacco comments:     Stopped electronic cigarettes on 2022   Vaping Use    Vaping status: Former    Devices: Pre-filled or refillable cartridge, Refillable tank   Substance and Sexual Activity    Alcohol use: Not Currently     Comment: Haven't drank alcohol in 25+ years    Drug use: Never    Sexual activity: Not Currently     Partners: Male     Birth control/protection: Tubal ligation, Birth control pill, Hysterectomy        Past Medical History:   Diagnosis  Date    Anemia 2021    Anxiety 2015    Arthritis     Asthma 2021    Emphysema    Cancer 2021-present    Skin    Cataract 2020    Cataract surgery in both eyes    Cholelithiasis     Surgery    Chronic bronchitis     CKD (chronic kidney disease)     FOLLOWED BY PCP LAST BUN 16, CREAT 1 AND GFR 61.5 NOV 2023    Colon polyp     Colonoscopy, removed and biopsied    COPD (chronic obstructive pulmonary disease)     Coronary artery disease     FOLLOWED BY DR WELSH . DENIES CP BUT HAS SOA WITH EXERTION CHRONIC ISSUE. DECREASED ACTIVITY D/T BACK PAIN AND SOA    Depression     Diabetes mellitus 2023    Pre- diabetes    Elevated glucose 04/14/2021    Emphysema of lung     Fibromyalgia, primary     ?    GERD (gastroesophageal reflux disease)     Headache 2020    History of 2019 novel coronavirus disease (COVID-19) 04/14/2021    HL (hearing loss) 2021    Hearing aids    Hyperlipidemia     Hypothyroidism     Irritable bowel syndrome     Constipation..ongoing    Low back pain 2022    Lower back, across hips, and down both legs    Neuropathic pain     Neuropathy     Obesity     Osteopenia     Peptic ulceration     Renal insufficiency     Rheumatoid arthritis     Scoliosis 2023    Awaiting decompression surgery    Shortness of breath on exertion     Stroke         Immunization History   Administered Date(s) Administered    Tdap 01/01/2020       Allergies   Allergen Reactions    Tramadol Nausea And Vomiting    Codeine Palpitations    Diazepam Anxiety    Iron Other (See Comments)     REPORTS CAUSES HER TO HAVE URINARY TRACT INFECTION    Nickel Hives, Swelling and Rash    Prednisone Unknown - Low Severity     REPORTS MAKES HER REALLY ANGRY AND MAD    Venlafaxine Nausea And Vomiting          Current Outpatient Medications:     albuterol sulfate  (90 Base) MCG/ACT inhaler, Inhale 2 puffs Every 6 (Six) Hours As Needed for Wheezing., Disp: 18 g, Rfl: 1    Ascorbic Acid (Vitamin C) 500 MG capsule, Take 1 capsule by mouth Daily.,  "Disp: , Rfl:     aspirin 81 MG EC tablet, Take 1 tablet by mouth Daily., Disp: 90 tablet, Rfl: 3    budesonide-formoterol (Symbicort) 80-4.5 MCG/ACT inhaler, Inhale 2 puffs 2 (Two) Times a Day., Disp: 10.2 g, Rfl: 2    cetirizine (zyrTEC) 10 MG tablet, Take 1 tablet by mouth Daily As Needed for Allergies., Disp: , Rfl:     gabapentin (NEURONTIN) 300 MG capsule, Take 1 capsule by mouth 2 (Two) Times a Day As Needed., Disp: , Rfl:     HYDROcodone-acetaminophen (NORCO)  MG per tablet, Take 1 tablet by mouth Every 6 (Six) Hours As Needed for Moderate Pain., Disp: , Rfl:     ipratropium-albuterol (DUO-NEB) 0.5-2.5 mg/3 ml nebulizer, Take 3 mL by nebulization Every 4 (Four) Hours As Needed for Wheezing or Shortness of Air., Disp: 360 mL, Rfl: 0    levothyroxine sodium (TIROSINT) 50 MCG capsule, Take 1 capsule by mouth Daily., Disp: 90 capsule, Rfl: 1    oxybutynin XL (DITROPAN-XL) 10 MG 24 hr tablet, Take 1 tablet by mouth 2 (Two) Times a Day., Disp: 180 tablet, Rfl: 1    simvastatin (ZOCOR) 20 MG tablet, Take 1 tablet by mouth Every Night., Disp: 90 tablet, Rfl: 1    multivitamin with minerals tablet tablet, Take 1 tablet by mouth Daily., Disp: , Rfl:            Vital Signs   /78 (BP Location: Right arm, Patient Position: Sitting, Cuff Size: Adult)   Pulse 59   Temp 96.4 °F (35.8 °C)   Resp 14   Ht 149.9 cm (59\")   Wt 73 kg (161 lb)   SpO2 91% Comment: room air  BMI 32.52 kg/m²       OBJECTIVE    Physical Exam  Vital Signs Reviewed   WDWN, Alert, NAD.    HEENT:  PERRL, EOMI.  OP, nares clear  Neck:  Supple, no JVD, no thyromegaly  Chest:  good aeration, clear to auscultation bilaterally, tympanic to percussion bilaterally, no work of breathing noted  CV: RRR, no MGR, pulses 2+, equal.  Abd:  Soft, NT, ND, + BS, no HSM  EXT:  no clubbing, no cyanosis, no edema  Neuro:  A&Ox3, CN grossly intact, no focal deficits.  Skin: No rashes or lesions noted    Results Review  I have personally reviewed the prior " office notes, hospital records, labs, and diagnostics.  Results  Complete PFT - Pre & Post Bronchodilator (Order [id number])  Order-Level Documents:    Scan on 6/26/2024 1023 by Dulce Chisholm APRN: PULMONARY FUNCTION TEST, [facility], 06/25/2024         Author: -- Service: -- Author Type: --   Filed: Date of Service: Creation Time:   Status: (Other)   Pulmonary function test     Spirometry:  No obstructive lung defect noted.  FEV1/FVC at the lower limit of normal which is borderline for obstruction.  FEV1 1.42 L / 77% predicted.  There is evidence of small airways obstruction with decreased FEF 25 to 75% with marked bronchodilator response.  No bronchodilator response was noted.  Flow volume loop within normal limits.     Lung volumes:  No evidence of restriction or hyperinflation.  Air-trapping present.     Diffusion capacity:  Diffusion capacity mildly reduced at 76% predicted.     Overall impression:  No evidence of obstruction or restriction.  Spirometry at the lower limit of normal and borderline for obstruction.  Does have small airways obstruction with significant bronchodilator response.  No bronchodilator response was noted.  Air-trapping is present.  Diffusion capacity mildly reduced.     Electronically signed by Rg Santos MD, 07/08/24, 10:54 AM EDT.               File Link    Scan on 6/26/2024 1023 by Dulce hCisholm APRN: PULMONARY FUNCTION TEST, [facility], 06/25/2024        Key Information    Document ID File Type Document Type Description   [id number] Image PULMONARY FUNCTION TEST - SCAN PULMONARY FUNCTION TEST, [facility], 06/25/2024     Import Information    Attached At Date Time User Dept   Order Level 6/26/2024 10:23 AM Dulce Chisholm APRN      Order    Pulmonary Function Test [[id number]]     Encounter    Hospital Encounter on 6/25/24 with [facility] ROOM 1     ASSESSMENT         Patient Active Problem List   Diagnosis    Chronic kidney disease    COPD (chronic obstructive pulmonary disease)     Elevated glucose    GERD (gastroesophageal reflux disease)    Mixed hyperlipidemia    Hypothyroidism    Neuropathy    DDD (degenerative disc disease), cervical    Primary localized osteoarthrosis of right lower leg    Right knee pain    Fibromyalgia    Low back pain    Osteoarthritis of right knee    Class 1 obesity due to excess calories with body mass index (BMI) of 32.0 to 32.9 in adult    Non-occlusive coronary artery disease    COPD (chronic obstructive pulmonary disease) with acute bronchitis    Chronic respiratory failure       Encounter Diagnoses   Name Primary?    Cigarette nicotine dependence in remission Yes    Chronic obstructive pulmonary disease, unspecified COPD type     Dyspnea on exertion       PLAN  -Low-dose lung cancer screening ordered for December 2024  -Patient to continue with Symbicort 2 puffs twice daily rinsing out her mouth after each use of the medication prescribed by her PCP  -Patient to continue with albuterol and/or duo nebulizer as needed for shortness of air or wheeze also prescribed by her PCP  -Patient will follow back up in 6 months after low-dose CT or sooner if needed  Diagnoses and all orders for this visit:    1. Cigarette nicotine dependence in remission (Primary)  -      CT Chest Low Dose Cancer Screening WO; Future    2. Chronic obstructive pulmonary disease, unspecified COPD type    3. Dyspnea on exertion           Smoking status:former  Vaccination status:reviewed  Medications personally reviewed    Follow Up  Return in about 6 months (around 1/16/2025).    Patient was given instructions and counseling regarding her condition or for health maintenance advice. Please see specific information pulled into the AVS if appropriate.       I spent 15 minutes caring for Veda Peña on this date of service. This time includes time spent by me in the following activities:preparing for the visit, reviewing tests, obtaining and/or reviewing a separately obtained history,  performing a medically appropriate examination and/or evaluation, counseling and educating the patient/family/caregiver, ordering medications, tests, or procedures, documenting information in the medical record, independently interpreting results and communicating that information with the patient/family/caregiver and answered questions family members, discuss medications.

## 2024-07-31 DIAGNOSIS — J43.9 PULMONARY EMPHYSEMA, UNSPECIFIED EMPHYSEMA TYPE: Chronic | ICD-10-CM

## 2024-07-31 DIAGNOSIS — E03.9 HYPOTHYROIDISM, UNSPECIFIED TYPE: Chronic | ICD-10-CM

## 2024-08-01 NOTE — TELEPHONE ENCOUNTER
Caller: Veda Peña    Relationship to patient: Self    Best call back number: [phone]    Patient is needing: PATIENT CALLED STATING SHE IS NEEDING A REFILL ON HER OMEPRAZOLE 20MG SENT TO [facility] IN [place]. PATIENT STATES SHE IS COMPLETELY OUT OF THIS MEDICATION. HUB UNABLE TO FIND IN CURRENT MED LIST.

## 2024-08-01 NOTE — TELEPHONE ENCOUNTER
Pt has refills remaining at pharmacy for Levothyroxine and Albuterol inhaler.     Pt is now requesting refill on Omeprazole 20mg - was rx previously, but is no longer active on med list.

## 2024-08-02 RX ORDER — ALBUTEROL SULFATE 90 UG/1
2 AEROSOL, METERED RESPIRATORY (INHALATION) EVERY 6 HOURS PRN
Qty: 18 G | Refills: 1 | OUTPATIENT
Start: 2024-08-02

## 2024-08-02 RX ORDER — LEVOTHYROXINE SODIUM 50 UG/1
50 CAPSULE ORAL DAILY
Qty: 90 CAPSULE | Refills: 1 | OUTPATIENT
Start: 2024-08-02

## 2024-08-06 NOTE — PROGRESS NOTES
Chief Complaint: recurrent uti    Subjective         History of Present Illness  Veda Peña is a 69 y.o. female presents to [facility] to be seen for recurrent UTI.    Patient presents reporting she has been having problems with recurrent UTIs. She had never had a UTI until age 60. She reports she gets urgency, burning and dribbles with her infections. No symptoms today.     Frequency-takes oxybutynin helps with this    Urgency-admits occasionally    Incontinence-admits with urgency if unable to get to restroom on time    Nocturia-2-3    GH-denies    History of stones-denies     surgeries-denies    Family history of  malignancy-sister- kidney CA, MGM- kidney CA    Cardiopulmonary-COPD, CAD    Anticoagulants-ASA 81 mg    Smoker-denies, quit 2013, quit vaping 2022    Urine culture  6/4/2024 greater than 100,000 colony forming units/mL of Enterococcus faecalis pansensitive  5/9/2024 greater than 100,000 colony forming units per mL of Enterococcus faecalis pansensitive  4/4/2024 50,000 colony-forming's per mL of E. coli pansensitive      Objective     Past Medical History:   Diagnosis Date    Anemia 2021    Anxiety 2015    Arthritis     Asthma 2021    Emphysema    Cancer 2021-present    Skin    Cataract 2020    Cataract surgery in both eyes    Cholelithiasis     Surgery    Chronic bronchitis     CKD (chronic kidney disease)     FOLLOWED BY PCP LAST BUN 16, CREAT 1 AND GFR 61.5 NOV 2023    Colon polyp     Colonoscopy, removed and biopsied    COPD (chronic obstructive pulmonary disease)     Coronary artery disease     FOLLOWED BY DR WELSH . DENIES CP BUT HAS SOA WITH EXERTION CHRONIC ISSUE. DECREASED ACTIVITY D/T BACK PAIN AND SOA    Depression     Diabetes mellitus 2023    Pre- diabetes    Elevated glucose 04/14/2021    Emphysema of lung     Fibromyalgia, primary     ?    GERD (gastroesophageal reflux disease)     Headache 2020    History of 2019 novel coronavirus disease (COVID-19)  04/14/2021    HL (hearing loss) 2021    Hearing aids    Hyperlipidemia     Hypothyroidism     Irritable bowel syndrome     Constipation..ongoing    Low back pain 2022    Lower back, across hips, and down both legs    Neuropathic pain     Neuropathy     Obesity     Osteopenia     Peptic ulceration     Renal insufficiency     Rheumatoid arthritis     Scoliosis 2023    Awaiting decompression surgery    Shortness of breath on exertion     Stroke        Past Surgical History:   Procedure Laterality Date    CARDIAC CATHETERIZATION N/A 11/17/2023    Procedure: Left Heart Cath with possible angioplasty;  Surgeon: Hema English MD;  Location: [facility] CATH INVASIVE LOCATION;  Service: Cardiovascular;  Laterality: N/A;    CHOLECYSTECTOMY      EYE SURGERY      cataract surgery of both eyes     HYSTERECTOMY      KNEE ACL RECONSTRUCTION Right     LUMBAR LAMINECTOMY Left 01/12/2024    Procedure: MINIMALLY INVASIVE LUMBAR LAMINECTOMY, left approach, lumbar 3-lumbar 4;  Surgeon: Dirk Vizcaino MD;  Location: [facility] MAIN OR;  Service: Neurosurgery;  Laterality: Left;    TUBAL ABDOMINAL LIGATION           Current Outpatient Medications:     albuterol sulfate  (90 Base) MCG/ACT inhaler, Inhale 2 puffs Every 6 (Six) Hours As Needed for Wheezing., Disp: 18 g, Rfl: 1    Ascorbic Acid (Vitamin C) 500 MG capsule, Take 1 capsule by mouth Daily., Disp: , Rfl:     aspirin 81 MG EC tablet, Take 1 tablet by mouth Daily., Disp: 90 tablet, Rfl: 3    budesonide-formoterol (Symbicort) 80-4.5 MCG/ACT inhaler, Inhale 2 puffs 2 (Two) Times a Day., Disp: 10.2 g, Rfl: 2    cetirizine (zyrTEC) 10 MG tablet, Take 1 tablet by mouth Daily As Needed for Allergies., Disp: , Rfl:     gabapentin (NEURONTIN) 300 MG capsule, Take 1 capsule by mouth 2 (Two) Times a Day As Needed., Disp: , Rfl:     HYDROcodone-acetaminophen (NORCO)  MG per tablet, Take 1 tablet by mouth Every 6 (Six) Hours As Needed for Moderate Pain., Disp: , Rfl:      ipratropium-albuterol (DUO-NEB) 0.5-2.5 mg/3 ml nebulizer, Take 3 mL by nebulization Every 4 (Four) Hours As Needed for Wheezing or Shortness of Air., Disp: 360 mL, Rfl: 0    levothyroxine sodium (TIROSINT) 50 MCG capsule, Take 1 capsule by mouth Daily., Disp: 90 capsule, Rfl: 1    multivitamin with minerals tablet tablet, Take 1 tablet by mouth Daily., Disp: , Rfl:     oxybutynin XL (DITROPAN-XL) 10 MG 24 hr tablet, Take 1 tablet by mouth 2 (Two) Times a Day., Disp: 180 tablet, Rfl: 1    estradiol (ESTRACE) 0.1 MG/GM vaginal cream, Use a pea sized amount vaginally twice weekly at hs, Disp: 42.5 g, Rfl: 12    Allergies   Allergen Reactions    Tramadol Nausea And Vomiting    Furosemide Other (See Comments)     Itching; swelling; burning    Codeine Palpitations    Diazepam Anxiety    Iron Other (See Comments)     REPORTS CAUSES HER TO HAVE URINARY TRACT INFECTION    Nickel Hives, Swelling and Rash    Prednisone Unknown - Low Severity     REPORTS MAKES HER REALLY ANGRY AND MAD    Venlafaxine Nausea And Vomiting        Family History   Problem Relation Age of Onset    Cancer Mother         Abdominal    Arthritis Mother     COPD Mother     Diabetes Mother     Heart disease Mother         Pacemaker,    Hypertension Mother     Cancer Father         Throat, prostate,  metastatic    Arthritis Father     Hypertension Father     Cancer Sister         Kidney    Cancer Maternal Grandmother         Pancreatic    Arthritis Maternal Grandmother     Diabetes Maternal Grandmother     Hypertension Maternal Grandmother         Stroke    Unknown Other     Unknown Other     Unknown Other     Unknown Other     Unknown Other     Thyroid disease Daughter             Arthritis Daughter     COPD Daughter     Diabetes Daughter     Vision loss Daughter         Glaucoma    Anxiety disorder Daughter         Anxiety-stress    Vision loss Daughter         Has glaucoma    Early death Daughter         Thyroid    Thyroid disease Daughter        "      Cancer Paternal Grandmother         Leukemia- non hodgkin's lymphoma    Anxiety disorder Daughter         Anxiety    Arthritis Daughter     COPD Daughter     Diabetes Daughter     Cancer Brother         Non- hodgkin's lymphoma    Hypertension Maternal Aunt         Stroke    Learning disabilities Son         ADHD       Social History     Socioeconomic History    Marital status:    Tobacco Use    Smoking status: Former     Current packs/day: 0.00     Average packs/day: 2.0 packs/day for 40.0 years (80.0 ttl pk-yrs)     Types: Cigarettes, Electronic Cigarette     Start date: 3/26/1973     Quit date: 3/26/2013     Years since quittin.3     Passive exposure: Past    Smokeless tobacco: Never    Tobacco comments:     Stopped electronic cigarettes on 2022   Vaping Use    Vaping status: Former    Devices: Pre-filled or refillable cartridge, Refillable tank   Substance and Sexual Activity    Alcohol use: Not Currently     Comment: Haven't drank alcohol in 25+ years    Drug use: Never    Sexual activity: Not Currently     Partners: Male     Birth control/protection: Tubal ligation, Birth control pill, Hysterectomy       Vital Signs:   /65 (BP Location: Left arm, Patient Position: Sitting, Cuff Size: Adult)   Pulse 58   Ht 149.9 cm (59\")   Wt 72.9 kg (160 lb 12.8 oz)   BMI 32.48 kg/m²      Physical Exam  Vitals reviewed.   Constitutional:       Appearance: Normal appearance.   Neurological:      General: No focal deficit present.      Mental Status: She is alert and oriented to person, place, and time.   Psychiatric:         Mood and Affect: Mood normal.         Behavior: Behavior normal.          Result Review :   The following data was reviewed by: ANA Tam on 2024:      Bladder Scan interpretation 2024    Estimation of residual urine via BVI 3000 Verathon Bladder Scan  MA/nurse performing: Aliza ACEVEDO MA  Residual Urine: 0 ml  Indication: Recurrent " UTI    Genitourinary syndrome of menopause   Position: Supine  Examination: Incremental scanning of the suprapubic area using 2.0 MHz transducer using copious amounts of acoustic gel.   Findings: An anechoic area was demonstrated which represented the bladder, with measurement of residual urine as noted. I inspected this myself. In that the residual urine was stable or insignificant, refer to plan for treatment and plan necessary at this time.                Procedures        Assessment and Plan    Diagnoses and all orders for this visit:    1. Recurrent UTI (Primary)  -     Bladder Scan  -     POC Urinalysis Dipstick, Automated    2. Genitourinary syndrome of menopause  -     estradiol (ESTRACE) 0.1 MG/GM vaginal cream; Use a pea sized amount vaginally twice weekly at hs  Dispense: 42.5 g; Refill: 12    Discussed with patient that vaginal atrophy is thinning, drying, and inflammation of the vaginal walls, due to drop in estrogen production.  Atrophic vaginitis frequently causes vaginal irritation and pain and also leads to urinary symptoms as well as increased susceptibility to urinary tract infections.  Recommend trial of vaginal estrogen cream  Side effects of localized estrogen discussed with patient. Most common side effect is vaginal discharge. Discussed associated black box warning and contraindications, does not exhibit any of these. Agreeable to trial of localized estrogen cream.  Order placed.     Advised patient that she should call anytime that she has symptoms of a UTI and I will order a urine culture.  Will continue to monitor the frequency and the type of bacteria to see if we need to do any further workup.  Hopefully starting the vaginal estrogen cream will prevent any future infections.    I will see her back in 3 months or sooner for any new concerns.    Follow Up   Return in about 3 months (around 11/7/2024).  Patient was given instructions and counseling regarding her condition or for health  maintenance advice. Please see specific information pulled into the AVS if appropriate.         This document has been electronically signed by ANA Tam  August 7, 2024 11:03 EDT

## 2024-08-07 ENCOUNTER — OFFICE VISIT (OUTPATIENT)
Dept: UROLOGY | Facility: CLINIC | Age: 69
End: 2024-08-07
Payer: MEDICARE

## 2024-08-07 VITALS
SYSTOLIC BLOOD PRESSURE: 125 MMHG | HEIGHT: 59 IN | BODY MASS INDEX: 32.42 KG/M2 | WEIGHT: 160.8 LBS | DIASTOLIC BLOOD PRESSURE: 65 MMHG | HEART RATE: 58 BPM

## 2024-08-07 DIAGNOSIS — N39.0 RECURRENT UTI: Primary | ICD-10-CM

## 2024-08-07 DIAGNOSIS — N95.8 GENITOURINARY SYNDROME OF MENOPAUSE: ICD-10-CM

## 2024-08-07 LAB
BILIRUB BLD-MCNC: NEGATIVE MG/DL
CLARITY, POC: CLEAR
COLOR UR: YELLOW
EXPIRATION DATE: NORMAL
GLUCOSE UR STRIP-MCNC: NEGATIVE MG/DL
KETONES UR QL: NEGATIVE
LEUKOCYTE EST, POC: NEGATIVE
Lab: NORMAL
NITRITE UR-MCNC: NEGATIVE MG/ML
PH UR: 5.5 [PH] (ref 5–8)
PROT UR STRIP-MCNC: NEGATIVE MG/DL
RBC # UR STRIP: NEGATIVE /UL
SP GR UR: 1.01 (ref 1–1.03)
URINE VOLUME: 0
UROBILINOGEN UR QL: NORMAL

## 2024-08-07 RX ORDER — ESTRADIOL 0.1 MG/G
CREAM VAGINAL
Qty: 42.5 G | Refills: 12 | Status: SHIPPED | OUTPATIENT
Start: 2024-08-07

## 2024-10-02 ENCOUNTER — TRANSCRIBE ORDERS (OUTPATIENT)
Dept: ADMINISTRATIVE | Facility: HOSPITAL | Age: 69
End: 2024-10-02
Payer: MEDICARE

## 2024-10-02 ENCOUNTER — HOSPITAL ENCOUNTER (OUTPATIENT)
Dept: GENERAL RADIOLOGY | Facility: HOSPITAL | Age: 69
Discharge: HOME | End: 2024-10-02
Admitting: NURSE PRACTITIONER
Payer: MEDICARE

## 2024-10-02 DIAGNOSIS — M25.552 PAIN IN LEFT HIP: ICD-10-CM

## 2024-10-02 DIAGNOSIS — M25.552 PAIN IN LEFT HIP: Primary | ICD-10-CM

## 2024-10-02 PROCEDURE — 73502 X-RAY EXAM HIP UNI 2-3 VIEWS: CPT

## 2024-10-30 RX ORDER — BUDESONIDE AND FORMOTEROL FUMARATE DIHYDRATE 80; 4.5 UG/1; UG/1
2 AEROSOL RESPIRATORY (INHALATION)
Qty: 10.2 G | Refills: 2 | Status: CANCELLED | OUTPATIENT
Start: 2024-10-30

## 2024-10-30 NOTE — PROGRESS NOTES
"Chief Complaint  Chief Complaint   Patient presents with    Follow-up     6 month     Hypothyroidism    Emphysema    OAB       Subjective          Veda Peña presents to [facility] for 6mth follow up.    History of Present Illness    Hypothyroidism: Patient is currently on Tirosint and doing well. Patient states energy is poor. Patient denies bowel changes and changes in hair, skin and nails. Patient has increased sweating with activity. Patient also c/o weight gain and inability to lose weight.     OAB: Patient is currently on Oxybutynin for overactive bladder. Patient states the medication is working well. Patient denies urinary frequency and leakage. Patient denies any adverse effects from medication.     HL: Patient was on Simvastain; Dr. Wilson took patient off secondary to elevated LFTs.     COPD with emphysema: Pt is currently using Albuterol inhaler and duoneb as needed but complains of increased shortness of air. LDCT 12/23; no suspicious mass. CT chest April 2024; negative. New LDCT ordered.    GERD: Dr. Wilson took patient off omeprazole; patient using Pepto Bismol now for reflux.    Patient just had labs with Dr. Zepeda--reviewed; thyroid and A1c not checked. Will order additional labs to be done in December.     Declines Pneumonia vaccine.     Labs: 5.9.24  --B12 still a bit elevated; suggest taking supplement about 2 times per week  --TSH is a bit elevated; suggest recheck in about 1mth; if still abnormal, will adjust dose. Recheck was normal in June.  --liver enzymes are still a bit elevated but improving from hospitalization.  --triglycerides (sugary, starchy part of your cholesterol) are elevated; decrease sugars and carbs in diet  --A1c a bit elevated; \"increased risk for diabetes\"; decrease sugars and carbs in diet  --Platelet have improved to normal, hemoglobin is still a bit low but improved. White blood cell count is now elevated (patient received " steroids in hospital); ferritin is still elevated; continue with appt with hematology.  --vitamin D is normal; I would suggest for patient to re-start the dose she was on prior to hospitalization.     Colon: 9.22.22 Cologuard/ f/u 3 years  Mammo: 6.25.24  DXA: 11.29.21  LDCT: 12/2023; f/u 1 year    Medical History: has a past medical history of Anemia (2021), Anxiety (2015), Arthritis, Asthma (2021), Cancer (2021-present), Cataract (2020), Cholelithiasis, Chronic bronchitis, CKD (chronic kidney disease), Colon polyp, COPD (chronic obstructive pulmonary disease), Coronary artery disease, Depression, Diabetes mellitus (2023), Elevated glucose (04/14/2021), Emphysema of lung, Fibromyalgia, primary, GERD (gastroesophageal reflux disease), Headache (2020), History of 2019 novel coronavirus disease (COVID-19) (04/14/2021), HL (hearing loss) (2021), Hyperlipidemia, Hypothyroidism, Irritable bowel syndrome, Low back pain (2022), Neuropathic pain, Neuropathy, Obesity, Osteopenia, Peptic ulceration, Renal insufficiency, Rheumatoid arthritis, Scoliosis (2023), Shortness of breath on exertion, and Stroke.   Surgical History: has a past surgical history that includes Anterior cruciate ligament repair (Right); Hysterectomy; Tubal ligation; Eye surgery; Cholecystectomy; Cardiac catheterization (N/A, 11/17/2023); and Lumbar laminectomy (Left, 01/12/2024).   Family History: family history includes Anxiety disorder in her daughter and daughter; Arthritis in her daughter, daughter, father, maternal grandmother, and mother; COPD in her daughter, daughter, and mother; Cancer in her brother, father, maternal grandmother, mother, paternal grandmother, and sister; Diabetes in her daughter, daughter, maternal grandmother, and mother; Early death in her daughter; Heart disease in her mother; Hypertension in her father, maternal aunt, maternal grandmother, and mother; Learning disabilities in her son; Thyroid disease in her daughter and  "daughter; Vision loss in her daughter and daughter.   Social History: reports that she quit smoking about 11 years ago. Her smoking use included cigarettes and electronic cigarette. She started smoking about 51 years ago. She has a 80 pack-year smoking history. She has been exposed to tobacco smoke. She has never used smokeless tobacco. She reports that she does not currently use alcohol. She reports that she does not use drugs.    Allergies: Tramadol, Codeine, Diazepam, Fosfomycin, Iron, Nickel, Prednisone, and Venlafaxine    Health Maintenance Due   Topic Date Due    DXA SCAN  11/29/2023       Objective     Vitals:    10/31/24 1002   BP: 107/51   Pulse: 64   Resp: 18   SpO2: 97%   Weight: 69.7 kg (153 lb 9.6 oz)   Height: 149.9 cm (59\")     Body mass index is 31.02 kg/m².     Wt Readings from Last 3 Encounters:   10/31/24 69.7 kg (153 lb 9.6 oz)   08/07/24 72.9 kg (160 lb 12.8 oz)   07/16/24 73 kg (161 lb)     BP Readings from Last 3 Encounters:   10/31/24 107/51   08/07/24 125/65   07/16/24 119/78       BMI is >= 30 and <35. (Class 1 Obesity). The following options were offered after discussion;: weight loss educational material (shared in after visit summary)      Patient Care Team:  Lina Nguyen PA as PCP - General (Family Medicine)  Pam Arroyo APRN as Nurse Practitioner (Urology)    Physical Exam  Vitals and nursing note reviewed.   Constitutional:       Appearance: Normal appearance. She is obese.   HENT:      Head: Normocephalic and atraumatic.   Neck:      Vascular: No carotid bruit.      Comments: Thyroid : gland size normal, nontender, no nodules or masses present on palpation   Cardiovascular:      Rate and Rhythm: Normal rate and regular rhythm.      Pulses: Normal pulses.      Heart sounds: Normal heart sounds.   Pulmonary:      Effort: Pulmonary effort is normal.      Breath sounds: Normal breath sounds.   Musculoskeletal:      Cervical back: Neck supple. No tenderness.      " Right lower leg: No edema.      Left lower leg: No edema.   Lymphadenopathy:      Cervical: No cervical adenopathy.   Neurological:      Mental Status: She is alert.   Psychiatric:         Mood and Affect: Mood normal.         Behavior: Behavior normal.           Result Review :              Assessment and Plan      Diagnoses and all orders for this visit:    1. Postmenopause  -     DEXA Bone Density Axial; Future    2. Hypothyroidism, unspecified type  Comments:  Currently stable: will continue with Levothyroxine 50mcg daily; check labs and follow up in 6mths  Orders:  -     levothyroxine sodium (TIROSINT) 50 MCG capsule; Take 1 capsule by mouth Daily.  Dispense: 90 capsule; Refill: 1  -     TSH; Future  -     T4, Free; Future    3. Vitamin D deficiency  Comments:  Unsure of stability  Orders:  -     Vitamin D,25-Hydroxy; Future    4. Elevated glucose  -     Comprehensive Metabolic Panel; Future  -     Hemoglobin A1c; Future    5. Abnormal blood chemistry  -     CBC & Differential; Future    6. Hyperlipidemia, unspecified hyperlipidemia type  Comments:  No longer on Zocor; check labs for stability.  Orders:  -     Comprehensive Metabolic Panel; Future  -     Lipid Panel; Future    7. Pulmonary emphysema, unspecified emphysema type  Comments:  Stable on Symbicort 80/4.5 2 puffs twice daily and as needed Albuterol; continue and f/u in 6mths.  Orders:  -     albuterol sulfate  (90 Base) MCG/ACT inhaler; Inhale 2 puffs Every 6 (Six) Hours As Needed for Wheezing.  Dispense: 18 g; Refill: 1    8. OAB (overactive bladder)  Comments:  Stable on Oxybutynin XL 10mg daily; check ua and follow up in 6mths.  Orders:  -     oxybutynin XL (DITROPAN-XL) 10 MG 24 hr tablet; Take 1 tablet by mouth 2 (Two) Times a Day.  Dispense: 180 tablet; Refill: 1            Follow Up     Return in about 6 months (around 4/30/2025).    Patient was given instructions and counseling regarding her condition or for health maintenance advice.  Please see specific information pulled into the AVS if appropriate.

## 2024-10-31 ENCOUNTER — OFFICE VISIT (OUTPATIENT)
Dept: FAMILY MEDICINE CLINIC | Facility: CLINIC | Age: 69
End: 2024-10-31
Payer: MEDICARE

## 2024-10-31 ENCOUNTER — TELEPHONE (OUTPATIENT)
Dept: FAMILY MEDICINE CLINIC | Facility: CLINIC | Age: 69
End: 2024-10-31

## 2024-10-31 VITALS
RESPIRATION RATE: 18 BRPM | BODY MASS INDEX: 30.96 KG/M2 | DIASTOLIC BLOOD PRESSURE: 51 MMHG | HEIGHT: 59 IN | WEIGHT: 153.6 LBS | HEART RATE: 64 BPM | OXYGEN SATURATION: 97 % | SYSTOLIC BLOOD PRESSURE: 107 MMHG

## 2024-10-31 DIAGNOSIS — R73.09 ELEVATED GLUCOSE: ICD-10-CM

## 2024-10-31 DIAGNOSIS — E03.9 HYPOTHYROIDISM, UNSPECIFIED TYPE: Chronic | ICD-10-CM

## 2024-10-31 DIAGNOSIS — Z78.0 POSTMENOPAUSE: ICD-10-CM

## 2024-10-31 DIAGNOSIS — J43.9 PULMONARY EMPHYSEMA, UNSPECIFIED EMPHYSEMA TYPE: Chronic | ICD-10-CM

## 2024-10-31 DIAGNOSIS — E78.5 HYPERLIPIDEMIA, UNSPECIFIED HYPERLIPIDEMIA TYPE: Chronic | ICD-10-CM

## 2024-10-31 DIAGNOSIS — N32.81 OAB (OVERACTIVE BLADDER): Chronic | ICD-10-CM

## 2024-10-31 DIAGNOSIS — E55.9 VITAMIN D DEFICIENCY: ICD-10-CM

## 2024-10-31 DIAGNOSIS — R79.9 ABNORMAL BLOOD CHEMISTRY: ICD-10-CM

## 2024-10-31 RX ORDER — OXYBUTYNIN CHLORIDE 10 MG/1
10 TABLET, EXTENDED RELEASE ORAL 2 TIMES DAILY
Qty: 180 TABLET | Refills: 1 | Status: SHIPPED | OUTPATIENT
Start: 2024-10-31

## 2024-10-31 RX ORDER — LEVOTHYROXINE SODIUM 50 UG/1
50 CAPSULE ORAL DAILY
Qty: 90 CAPSULE | Refills: 1 | Status: SHIPPED | OUTPATIENT
Start: 2024-10-31

## 2024-10-31 RX ORDER — ALBUTEROL SULFATE 90 UG/1
2 INHALANT RESPIRATORY (INHALATION) EVERY 6 HOURS PRN
Qty: 18 G | Refills: 1 | Status: SHIPPED | OUTPATIENT
Start: 2024-10-31

## 2024-10-31 NOTE — Clinical Note
Please contact patient to let her know that I reviewed labs from hematology. I will order additional labs that she can have done in December--maybe when she gets her CT and Dexa.

## 2024-10-31 NOTE — TELEPHONE ENCOUNTER
Pharmacy Name:  [facility]    Pharmacy representative name: SHELLIE    Pharmacy representative phone number: [phone]     What medication are you calling in regards to: levothyroxine sodium (TIROSINT) 50 MCG capsule     What question does the pharmacy have: CAN THIS BE SWITCHED TO TABLETS    Who is the provider that prescribed the medication: SHEILA    Additional notes: SHELLIE STATES THAT THEY HAVE TROUBLE GETTING THE CAPSULES IN STOCK AND THAT THE PATIENT SAID THAT SHE CAN TOLERATE THE TABLETS.

## 2024-11-01 NOTE — TELEPHONE ENCOUNTER
Caller: Veda Peña    Relationship to patient: Self    Best call back number: [phone]    Patient is needing: PATIENT CALLED STATING SHE REACHED OUT TO HER PHARMACY BUT WAS TOLD THE OFFICE HAS STILL NOT REACHED OUT AND GIVEN THEM THE OKAY TO SWITCH FROM CAPSULES TO THE TABLETS ON THE LEVOTHYROXINE. PATIENT STATES SHE IS COMPLETELY OUT AND IS NEEDING SOMEONE TO REACH OUT TO THE PHARMACY TODAY IF POSSIBLE.

## 2024-11-06 NOTE — PROGRESS NOTES
Chief Complaint: Recurrent UTI    Subjective         History of Present Illness  Veda Peña is a 69 y.o. female presents to [facility] to be seen for follow-up for recurrent UTI. At her previous visit she was started on vaginal estrogen cream.  She is also taking oxybutynin.  She was also encouraged to contact our office anytime that she had symptoms of UTI so that we could order a urine culture.    She reports that she is doing well on her medications. She states that she has not had any UTIs since her last visit.  She did have a couple of days where her bladder felt irritated after eating high sugar foods and drinking soda.  She states those symptoms resolved within 24 hours or less.      Previous 8/7/2024  Patient presents reporting she has been having problems with recurrent UTIs. She had never had a UTI until age 60. She reports she gets urgency, burning and dribbles with her infections. No symptoms today.      Frequency-takes oxybutynin helps with this     Urgency-admits occasionally     Incontinence-admits with urgency if unable to get to restroom on time     Nocturia-2-3     GH-denies     History of stones-denies      surgeries-denies     Family history of  malignancy-sister- kidney CA, MGM- kidney CA     Cardiopulmonary-COPD, CAD     Anticoagulants-ASA 81 mg     Smoker-denies, quit 2013, quit vaping 2022     Urine culture  6/4/2024 greater than 100,000 colony forming units/mL of Enterococcus faecalis pansensitive  5/9/2024 greater than 100,000 colony forming units per mL of Enterococcus faecalis pansensitive  4/4/2024 50,000 colony-forming's per mL of E. coli pansensitive       Objective     Past Medical History:   Diagnosis Date    Anemia 2021    Anxiety 2015    Arthritis     Asthma 2021    Emphysema    Cancer 2021-present    Skin    Cataract 2020    Cataract surgery in both eyes    Cholelithiasis     Surgery    Chronic bronchitis     CKD (chronic kidney disease)      FOLLOWED BY PCP LAST BUN 16, CREAT 1 AND GFR 61.5 NOV 2023    Colon polyp     Colonoscopy, removed and biopsied    COPD (chronic obstructive pulmonary disease)     Coronary artery disease     FOLLOWED BY DR WELSH . DENIES CP BUT HAS SOA WITH EXERTION CHRONIC ISSUE. DECREASED ACTIVITY D/T BACK PAIN AND SOA    Depression     Diabetes mellitus 2023    Pre- diabetes    Elevated glucose 04/14/2021    Emphysema of lung     Fibromyalgia, primary     ?    GERD (gastroesophageal reflux disease)     Headache 2020    History of 2019 novel coronavirus disease (COVID-19) 04/14/2021    HL (hearing loss) 2021    Hearing aids    Hyperlipidemia     Hypothyroidism     Irritable bowel syndrome     Constipation..ongoing    Low back pain 2022    Lower back, across hips, and down both legs    Neuropathic pain     Neuropathy     Obesity     Osteopenia     Peptic ulceration     Renal insufficiency     Rheumatoid arthritis     Scoliosis 2023    Awaiting decompression surgery    Shortness of breath on exertion     Stroke        Past Surgical History:   Procedure Laterality Date    CARDIAC CATHETERIZATION N/A 11/17/2023    Procedure: Left Heart Cath with possible angioplasty;  Surgeon: Hema Welsh MD;  Location: [facility] CATH INVASIVE LOCATION;  Service: Cardiovascular;  Laterality: N/A;    CHOLECYSTECTOMY      EYE SURGERY      cataract surgery of both eyes     HYSTERECTOMY      KNEE ACL RECONSTRUCTION Right     LUMBAR LAMINECTOMY Left 01/12/2024    Procedure: MINIMALLY INVASIVE LUMBAR LAMINECTOMY, left approach, lumbar 3-lumbar 4;  Surgeon: Dirk Vizcaino MD;  Location: [facility] MAIN OR;  Service: Neurosurgery;  Laterality: Left;    TUBAL ABDOMINAL LIGATION           Current Outpatient Medications:     albuterol sulfate  (90 Base) MCG/ACT inhaler, Inhale 2 puffs Every 6 (Six) Hours As Needed for Wheezing., Disp: 18 g, Rfl: 1    aspirin 81 MG EC tablet, Take 1 tablet by mouth Daily., Disp: 90 tablet, Rfl: 3     budesonide-formoterol (Symbicort) 80-4.5 MCG/ACT inhaler, Inhale 2 puffs 2 (Two) Times a Day., Disp: 10.2 g, Rfl: 2    cetirizine (zyrTEC) 10 MG tablet, Take 1 tablet by mouth Daily As Needed for Allergies., Disp: , Rfl:     estradiol (ESTRACE) 0.1 MG/GM vaginal cream, Use a pea sized amount vaginally twice weekly at hs, Disp: 42.5 g, Rfl: 12    gabapentin (NEURONTIN) 300 MG capsule, Take 1 capsule by mouth 2 (Two) Times a Day As Needed., Disp: , Rfl:     HYDROcodone-acetaminophen (NORCO)  MG per tablet, Take 1 tablet by mouth Every 6 (Six) Hours As Needed for Moderate Pain., Disp: , Rfl:     ipratropium-albuterol (DUO-NEB) 0.5-2.5 mg/3 ml nebulizer, Take 3 mL by nebulization Every 4 (Four) Hours As Needed for Wheezing or Shortness of Air., Disp: 360 mL, Rfl: 0    levothyroxine sodium (TIROSINT) 50 MCG capsule, Take 1 capsule by mouth Daily., Disp: 90 capsule, Rfl: 1    oxybutynin XL (DITROPAN-XL) 10 MG 24 hr tablet, Take 1 tablet by mouth 2 (Two) Times a Day., Disp: 180 tablet, Rfl: 1    Allergies   Allergen Reactions    Tramadol Nausea And Vomiting    Codeine Palpitations    Diazepam Anxiety    Fosfomycin Other (See Comments)     Vaginal burning      Iron Other (See Comments)     REPORTS CAUSES HER TO HAVE URINARY TRACT INFECTION    Nickel Hives, Swelling and Rash    Prednisone Unknown - Low Severity     REPORTS MAKES HER REALLY ANGRY AND MAD    Venlafaxine Nausea And Vomiting        Family History   Problem Relation Age of Onset    Cancer Mother         Abdominal    Arthritis Mother     COPD Mother     Diabetes Mother     Heart disease Mother         Pacemaker,    Hypertension Mother     Cancer Father         Throat, prostate,  metastatic    Arthritis Father     Hypertension Father     Cancer Sister         Kidney    Cancer Maternal Grandmother         Pancreatic    Arthritis Maternal Grandmother     Diabetes Maternal Grandmother     Hypertension Maternal Grandmother         Stroke    Unknown Other      "Unknown Other     Unknown Other     Unknown Other     Unknown Other     Thyroid disease Daughter             Arthritis Daughter     COPD Daughter     Diabetes Daughter     Vision loss Daughter         Glaucoma    Anxiety disorder Daughter         Anxiety-stress    Vision loss Daughter         Has glaucoma    Early death Daughter         Thyroid    Thyroid disease Daughter             Cancer Paternal Grandmother         Leukemia- non hodgkin's lymphoma    Anxiety disorder Daughter         Anxiety    Arthritis Daughter     COPD Daughter     Diabetes Daughter     Cancer Brother         Non- hodgkin's lymphoma    Hypertension Maternal Aunt         Stroke    Learning disabilities Son         ADHD       Social History     Socioeconomic History    Marital status:    Tobacco Use    Smoking status: Former     Current packs/day: 0.00     Average packs/day: 2.0 packs/day for 40.0 years (80.0 ttl pk-yrs)     Types: Cigarettes, Electronic Cigarette     Start date: 3/26/1973     Quit date: 3/26/2013     Years since quittin.6     Passive exposure: Past    Smokeless tobacco: Never    Tobacco comments:     Stopped electronic cigarettes on 2022   Vaping Use    Vaping status: Former    Devices: Pre-filled or refillable cartridge, Refillable tank   Substance and Sexual Activity    Alcohol use: Not Currently     Comment: Haven't drank alcohol in 25+ years    Drug use: Never    Sexual activity: Not Currently     Partners: Male     Birth control/protection: Tubal ligation, Birth control pill, Hysterectomy       Vital Signs:   /73 (BP Location: Left arm, Patient Position: Sitting, Cuff Size: Adult)   Pulse 57   Ht 149.9 cm (59\")   Wt 69.7 kg (153 lb 10.6 oz)   BMI 31.04 kg/m²      Physical Exam  Vitals reviewed.   Constitutional:       Appearance: Normal appearance.   Neurological:      General: No focal deficit present.      Mental Status: She is alert and oriented to person, place, and time. "   Psychiatric:         Mood and Affect: Mood normal.         Behavior: Behavior normal.        Result Review :   The following data was reviewed by: ANA Tam on 11/07/2024:  Results for orders placed or performed in visit on 11/07/24   Bladder Scan    Collection Time: 11/07/24  9:09 AM   Result Value Ref Range    Urine Volume 0             Procedures        Assessment and Plan    Diagnoses and all orders for this visit:    1. Recurrent UTI (Primary)  -     Bladder Scan    2. Genitourinary syndrome of menopause    Given that the patient on her medications, we will have her continue her current treatment plan.  Will plan to see the patient back in the office in 6 months or sooner if needed.  The patient knows that she is able to call the office anytime that she has signs or symptoms of UTI and we will order a urine culture.      Follow Up   No follow-ups on file.  Patient was given instructions and counseling regarding her condition or for health maintenance advice. Please see specific information pulled into the AVS if appropriate.         This document has been electronically signed by ANA Tam  November 7, 2024 09:19 EST

## 2024-11-07 ENCOUNTER — OFFICE VISIT (OUTPATIENT)
Dept: UROLOGY | Age: 69
End: 2024-11-07
Payer: MEDICARE

## 2024-11-07 VITALS
HEART RATE: 57 BPM | WEIGHT: 153.66 LBS | DIASTOLIC BLOOD PRESSURE: 73 MMHG | SYSTOLIC BLOOD PRESSURE: 152 MMHG | HEIGHT: 59 IN | BODY MASS INDEX: 30.98 KG/M2

## 2024-11-07 DIAGNOSIS — N39.0 RECURRENT UTI: Primary | ICD-10-CM

## 2024-11-07 DIAGNOSIS — N95.8 GENITOURINARY SYNDROME OF MENOPAUSE: ICD-10-CM

## 2024-11-07 LAB — URINE VOLUME: 0

## 2024-12-09 ENCOUNTER — OFFICE VISIT (OUTPATIENT)
Dept: CARDIOLOGY | Facility: CLINIC | Age: 69
End: 2024-12-09
Payer: MEDICARE

## 2024-12-09 VITALS
SYSTOLIC BLOOD PRESSURE: 121 MMHG | HEIGHT: 59 IN | DIASTOLIC BLOOD PRESSURE: 50 MMHG | HEART RATE: 64 BPM | BODY MASS INDEX: 30.64 KG/M2 | WEIGHT: 152 LBS

## 2024-12-09 DIAGNOSIS — E78.2 MIXED HYPERLIPIDEMIA: ICD-10-CM

## 2024-12-09 DIAGNOSIS — I25.10 NON-OCCLUSIVE CORONARY ARTERY DISEASE: Primary | ICD-10-CM

## 2024-12-09 PROCEDURE — 99213 OFFICE O/P EST LOW 20 MIN: CPT | Performed by: INTERNAL MEDICINE

## 2024-12-09 NOTE — PROGRESS NOTES
CARDIOLOGY FOLLOW-UP PROGRESS NOTE        Chief Complaint  Follow-up, Coronary Artery Disease, and Hyperlipidemia    Subjective            Veda Peña presents to [facility] CARDIOLOGY  History of Present Illness    Ms Peña is here for annual follow-up visit.  She had a hospital admission in April of this year with COPD exacerbation.  She also had acute kidney injury, pancytopenia along with elevated liver enzymes.  Statins were discontinued during hospital stay.  She is still following up with Dr. Douglass for pancytopenia.    Overall she feels fine and back to her baseline.  Fatigue and shortness of breath improved.  No recent episodes of chest pain.  Denies palpitations.      Past History:    Coronary artery disease-[facility] 11/17/2023, nonobstructive single-vessel disease to the RCA  Chronic obstructive pulmonary disease  Hiatal hernia  Mixed hyperlipidemia    Medical History:  Past Medical History:   Diagnosis Date    Anemia 2021    Anxiety 2015    Arthritis     Asthma 2021    Emphysema    Cancer 2021-present    Skin    Cataract 2020    Cholelithiasis     Surgery    Chronic bronchitis     CKD (chronic kidney disease)     Colon polyp     Colonoscopy, removed and biopsied    COPD (chronic obstructive pulmonary disease)     Coronary artery disease     Depression     Diabetes mellitus 2023    Pre- diabetes    Emphysema of lung     GERD (gastroesophageal reflux disease)     Headache 2020    History of 2019 novel coronavirus disease (COVID-19) 04/14/2021    HL (hearing loss) 2021    Hearing aids    Hyperlipidemia     Hypothyroidism     Irritable bowel syndrome     Low back pain 2022    Lower back, across hips, and down both legs    Neuropathic pain     Neuropathy     Obesity     Osteopenia     Peptic ulceration     Renal insufficiency     Rheumatoid arthritis     Scoliosis 2023    Shortness of breath on exertion     Stroke        Family History: family history includes Anxiety disorder in her  daughter and daughter; Arthritis in her daughter, daughter, father, maternal grandmother, and mother; COPD in her daughter, daughter, and mother; Cancer in her brother, father, maternal grandmother, mother, paternal grandmother, and sister; Diabetes in her daughter, daughter, maternal grandmother, and mother; Early death in her daughter; Heart disease in her mother; Hypertension in her father, maternal aunt, maternal grandmother, and mother; Learning disabilities in her son; Thyroid disease in her daughter and daughter; Vision loss in her daughter and daughter.     Social History: reports that she quit smoking about 11 years ago. Her smoking use included cigarettes and electronic cigarette. She started smoking about 51 years ago. She has a 80 pack-year smoking history. She has been exposed to tobacco smoke. She has never used smokeless tobacco. She reports that she does not currently use alcohol. She reports that she does not use drugs.    Allergies: Tramadol, Codeine, Diazepam, Fosfomycin, Iron, Nickel, Prednisone, and Venlafaxine    Current Outpatient Medications on File Prior to Visit   Medication Sig    albuterol sulfate  (90 Base) MCG/ACT inhaler Inhale 2 puffs Every 6 (Six) Hours As Needed for Wheezing.    aspirin 81 MG EC tablet Take 1 tablet by mouth Daily.    budesonide-formoterol (Symbicort) 80-4.5 MCG/ACT inhaler Inhale 2 puffs 2 (Two) Times a Day.    cetirizine (zyrTEC) 10 MG tablet Take 1 tablet by mouth Daily As Needed for Allergies.    estradiol (ESTRACE) 0.1 MG/GM vaginal cream Use a pea sized amount vaginally twice weekly at     gabapentin (NEURONTIN) 300 MG capsule Take 1 capsule by mouth 2 (Two) Times a Day As Needed.    HYDROcodone-acetaminophen (NORCO)  MG per tablet Take 1 tablet by mouth Every 6 (Six) Hours As Needed for Moderate Pain.    ipratropium-albuterol (DUO-NEB) 0.5-2.5 mg/3 ml nebulizer Take 3 mL by nebulization Every 4 (Four) Hours As Needed for Wheezing or Shortness  "of Air.    levothyroxine sodium (TIROSINT) 50 MCG capsule Take 1 capsule by mouth Daily.    oxybutynin XL (DITROPAN-XL) 10 MG 24 hr tablet Take 1 tablet by mouth 2 (Two) Times a Day.     No current facility-administered medications on file prior to visit.          Review of Systems   Constitutional:  Positive for fatigue.   Respiratory:  Negative for cough, shortness of breath and wheezing.    Cardiovascular:  Negative for chest pain, palpitations and leg swelling.   Gastrointestinal:  Negative for nausea and vomiting.   Neurological:  Negative for dizziness and syncope.        Objective     /50   Pulse 64   Ht 149.9 cm (59\")   Wt 68.9 kg (152 lb)   BMI 30.70 kg/m²       Physical Exam    General : Alert, awake, no acute distress  Neck : Supple, no carotid bruit, no jugular venous distention  CVS : Regular rate and rhythm, no murmur, rubs or gallops  Lungs: Clear to auscultation bilaterally, no crackles or rhonchi  Abdomen: Soft, nontender, bowel sounds heard in all 4 quadrants  Extremities: Warm, well-perfused, no pedal edema    Result Review :     The following data was reviewed by: Hema English MD on 12/09/2024:    CMP          4/29/2024    05:11 4/30/2024    04:52 5/9/2024    10:56   CMP   Glucose 167  166  86    BUN 20  18  13    Creatinine 1.09  0.86  1.08    EGFR 55.4  73.7  56.1    Sodium 136  142  139    Potassium 4.5  4.1  4.5    Chloride 102  106  104    Calcium 8.2  8.3  9.5    Total Protein  6.9  7.3    Albumin  3.7  4.2    Globulin  3.2  3.1    Total Bilirubin  0.2  0.6    Alkaline Phosphatase  100  80    AST (SGOT)  64  52    ALT (SGPT)  66  58    Albumin/Globulin Ratio  1.2  1.4    BUN/Creatinine Ratio 18.3  20.9  12.0    Anion Gap 13.8  12.4  9.3      CBC          4/29/2024    05:11 4/30/2024    04:52 5/9/2024    10:56   CBC   WBC 2.12  6.46  12.41    RBC 4.34  4.24  4.36    Hemoglobin 10.3  10.1  10.4    Hematocrit 34.7  33.7  34.5    MCV 80.0  79.5  79.1    MCH 23.7  23.8  23.9  "   MCHC 29.7  30.0  30.1    RDW 16.4  16.8  16.1    Platelets 104  140  281      TSH          5/9/2024    10:56 6/4/2024    10:31   TSH   TSH 4.250  3.570      Lipid Panel          5/9/2024    10:56   Lipid Panel   Total Cholesterol 179    Triglycerides 213    HDL Cholesterol 36    VLDL Cholesterol 37    LDL Cholesterol  106    LDL/HDL Ratio 2.79           Data reviewed: Cardiology studies        Results for orders placed during the hospital encounter of 03/17/23    Adult Transthoracic Echo Complete W/ Cont if Necessary Per Protocol    Interpretation Summary    Left ventricular systolic function is normal. Left ventricular ejection fraction appears to be 56 - 60%.    Left ventricular diastolic function is consistent with (grade I) impaired relaxation.    There are no significant valvular abnormalities.    Estimated right ventricular systolic pressure from tricuspid regurgitation is normal (<35 mmHg).      Results for orders placed during the hospital encounter of 03/17/23    Stress Test With Myocardial Perfusion One Day    Interpretation Summary    Myocardial perfusion imaging indicates a normal myocardial perfusion study with no evidence of ischemia.    Left ventricular ejection fraction is hyperdynamic (Calculated EF > 70%).    There was no chest pain with regadenoson infusion.  EKG on record at infusion showed 1 mm horizontal ST segment depressions, suggestive of ischemia.    Impressions are consistent with a low risk study.    Conclusion : Stress EKG showed ischemic changes, but SPECT stress test did not show any perfusion defects.  Clinical correlation recommended.               Assessment and Plan        Diagnoses and all orders for this visit:    1. Non-occlusive coronary artery disease (Primary)  Assessment & Plan:  Currently stable with no angina.  LV systolic function is preserved.  Recommend to continue aspirin.  Currently off statins due to elevated liver enzymes.      2. Mixed hyperlipidemia  Assessment  & Plan:  Statins were discontinued during hospital stay in April due to elevated liver enzymes.  We will get the latest labs from Dr. Douglass's office and if liver enzymes are back to normal, will restart statins, preferably with atorvastatin.                Follow Up     Return in about 1 year (around 12/9/2025) for Next scheduled follow up.    Patient was given instructions and counseling regarding her condition or for health maintenance advice. Please see specific information pulled into the AVS if appropriate.

## 2024-12-09 NOTE — ASSESSMENT & PLAN NOTE
Statins were discontinued during hospital stay in April due to elevated liver enzymes.  We will get the latest labs from Dr. Douglass's office and if liver enzymes are back to normal, will restart statins, preferably with atorvastatin.

## 2024-12-09 NOTE — ASSESSMENT & PLAN NOTE
Currently stable with no angina.  LV systolic function is preserved.  Recommend to continue aspirin.  Currently off statins due to elevated liver enzymes.

## 2024-12-12 ENCOUNTER — TELEPHONE (OUTPATIENT)
Dept: CARDIOLOGY | Facility: CLINIC | Age: 69
End: 2024-12-12
Payer: MEDICARE

## 2024-12-12 NOTE — TELEPHONE ENCOUNTER
[facility] received a fax that requires your attention. The document has been indexed to the patient’s chart for your review.      Reason for sending: EXTERNAL MEDICAL RECORD NOTIFICATION     Documents Description: CARE COORDINATION; [id number]    Name of Sender: [employer]     Date Indexed: 12.10.24

## 2024-12-13 ENCOUNTER — TELEPHONE (OUTPATIENT)
Dept: CARDIOLOGY | Facility: CLINIC | Age: 69
End: 2024-12-13
Payer: MEDICARE

## 2024-12-13 NOTE — TELEPHONE ENCOUNTER
[facility] received a fax that requires your attention. The document has been indexed to the patient’s chart for your review.      Reason for sending: EXTERNAL MEDICAL RECORD NOTIFICATION     Documents Description: MED REC [id number]    Name of Sender: [employer]    Date Indexed: 12.13.24

## 2024-12-30 ENCOUNTER — HOSPITAL ENCOUNTER (OUTPATIENT)
Dept: CT IMAGING | Facility: HOSPITAL | Age: 69
Discharge: HOME | End: 2024-12-30
Payer: MEDICARE

## 2024-12-30 ENCOUNTER — LAB (OUTPATIENT)
Dept: LAB | Facility: HOSPITAL | Age: 69
End: 2024-12-30
Payer: MEDICARE

## 2024-12-30 ENCOUNTER — HOSPITAL ENCOUNTER (OUTPATIENT)
Dept: BONE DENSITY | Facility: HOSPITAL | Age: 69
Discharge: HOME | End: 2024-12-30
Payer: MEDICARE

## 2024-12-30 DIAGNOSIS — E03.9 HYPOTHYROIDISM, UNSPECIFIED TYPE: Chronic | ICD-10-CM

## 2024-12-30 DIAGNOSIS — R73.09 ELEVATED GLUCOSE: ICD-10-CM

## 2024-12-30 DIAGNOSIS — E78.5 HYPERLIPIDEMIA, UNSPECIFIED HYPERLIPIDEMIA TYPE: Chronic | ICD-10-CM

## 2024-12-30 DIAGNOSIS — E55.9 VITAMIN D DEFICIENCY: ICD-10-CM

## 2024-12-30 DIAGNOSIS — Z78.0 POSTMENOPAUSE: ICD-10-CM

## 2024-12-30 DIAGNOSIS — R79.9 ABNORMAL BLOOD CHEMISTRY: ICD-10-CM

## 2024-12-30 DIAGNOSIS — F17.211 CIGARETTE NICOTINE DEPENDENCE IN REMISSION: ICD-10-CM

## 2024-12-30 LAB
25(OH)D3 SERPL-MCNC: 44.7 NG/ML (ref 30–100)
ALBUMIN SERPL-MCNC: 4.1 G/DL (ref 3.5–5.2)
ALBUMIN/GLOB SERPL: 1.1 G/DL
ALP SERPL-CCNC: 75 U/L (ref 39–117)
ALT SERPL W P-5'-P-CCNC: 10 U/L (ref 1–33)
ANION GAP SERPL CALCULATED.3IONS-SCNC: 10.7 MMOL/L (ref 5–15)
AST SERPL-CCNC: 19 U/L (ref 1–32)
BASOPHILS # BLD AUTO: 0.03 10*3/MM3 (ref 0–0.2)
BASOPHILS NFR BLD AUTO: 0.5 % (ref 0–1.5)
BILIRUB SERPL-MCNC: 0.4 MG/DL (ref 0–1.2)
BUN SERPL-MCNC: 14 MG/DL (ref 8–23)
BUN/CREAT SERPL: 12.6 (ref 7–25)
CALCIUM SPEC-SCNC: 9.2 MG/DL (ref 8.6–10.5)
CHLORIDE SERPL-SCNC: 106 MMOL/L (ref 98–107)
CHOLEST SERPL-MCNC: 232 MG/DL (ref 0–200)
CO2 SERPL-SCNC: 22.3 MMOL/L (ref 22–29)
CREAT SERPL-MCNC: 1.11 MG/DL (ref 0.57–1)
DEPRECATED RDW RBC AUTO: 41 FL (ref 37–54)
EGFRCR SERPLBLD CKD-EPI 2021: 53.9 ML/MIN/1.73
EOSINOPHIL # BLD AUTO: 0.02 10*3/MM3 (ref 0–0.4)
EOSINOPHIL NFR BLD AUTO: 0.4 % (ref 0.3–6.2)
ERYTHROCYTE [DISTWIDTH] IN BLOOD BY AUTOMATED COUNT: 14.8 % (ref 12.3–15.4)
GLOBULIN UR ELPH-MCNC: 3.6 GM/DL
GLUCOSE SERPL-MCNC: 107 MG/DL (ref 65–99)
HBA1C MFR BLD: 6 % (ref 4.8–5.6)
HCT VFR BLD AUTO: 39.8 % (ref 34–46.6)
HDLC SERPL-MCNC: 38 MG/DL (ref 40–60)
HGB BLD-MCNC: 12.6 G/DL (ref 12–15.9)
IMM GRANULOCYTES # BLD AUTO: 0.02 10*3/MM3 (ref 0–0.05)
IMM GRANULOCYTES NFR BLD AUTO: 0.4 % (ref 0–0.5)
LDLC SERPL CALC-MCNC: 175 MG/DL (ref 0–100)
LDLC/HDLC SERPL: 4.55 {RATIO}
LYMPHOCYTES # BLD AUTO: 2.33 10*3/MM3 (ref 0.7–3.1)
LYMPHOCYTES NFR BLD AUTO: 42.4 % (ref 19.6–45.3)
MCH RBC QN AUTO: 24.5 PG (ref 26.6–33)
MCHC RBC AUTO-ENTMCNC: 31.7 G/DL (ref 31.5–35.7)
MCV RBC AUTO: 77.4 FL (ref 79–97)
MONOCYTES # BLD AUTO: 0.68 10*3/MM3 (ref 0.1–0.9)
MONOCYTES NFR BLD AUTO: 12.4 % (ref 5–12)
NEUTROPHILS NFR BLD AUTO: 2.42 10*3/MM3 (ref 1.7–7)
NEUTROPHILS NFR BLD AUTO: 43.9 % (ref 42.7–76)
NRBC BLD AUTO-RTO: 0 /100 WBC (ref 0–0.2)
PLATELET # BLD AUTO: 248 10*3/MM3 (ref 140–450)
PMV BLD AUTO: 11.3 FL (ref 6–12)
POTASSIUM SERPL-SCNC: 4.2 MMOL/L (ref 3.5–5.2)
PROT SERPL-MCNC: 7.7 G/DL (ref 6–8.5)
RBC # BLD AUTO: 5.14 10*6/MM3 (ref 3.77–5.28)
SODIUM SERPL-SCNC: 139 MMOL/L (ref 136–145)
T4 FREE SERPL-MCNC: 1.24 NG/DL (ref 0.92–1.68)
TRIGL SERPL-MCNC: 105 MG/DL (ref 0–150)
TSH SERPL DL<=0.05 MIU/L-ACNC: 0.73 UIU/ML (ref 0.27–4.2)
VLDLC SERPL-MCNC: 19 MG/DL (ref 5–40)
WBC NRBC COR # BLD AUTO: 5.5 10*3/MM3 (ref 3.4–10.8)

## 2024-12-30 PROCEDURE — 85025 COMPLETE CBC W/AUTO DIFF WBC: CPT

## 2024-12-30 PROCEDURE — 82306 VITAMIN D 25 HYDROXY: CPT

## 2024-12-30 PROCEDURE — 80061 LIPID PANEL: CPT

## 2024-12-30 PROCEDURE — 71271 CT THORAX LUNG CANCER SCR C-: CPT

## 2024-12-30 PROCEDURE — 36415 COLL VENOUS BLD VENIPUNCTURE: CPT

## 2024-12-30 PROCEDURE — 77080 DXA BONE DENSITY AXIAL: CPT

## 2024-12-30 PROCEDURE — 83036 HEMOGLOBIN GLYCOSYLATED A1C: CPT

## 2024-12-30 PROCEDURE — 80053 COMPREHEN METABOLIC PANEL: CPT

## 2024-12-30 PROCEDURE — 84443 ASSAY THYROID STIM HORMONE: CPT

## 2024-12-30 PROCEDURE — 84439 ASSAY OF FREE THYROXINE: CPT

## 2024-12-31 DIAGNOSIS — F17.211 CIGARETTE NICOTINE DEPENDENCE IN REMISSION: Primary | ICD-10-CM

## 2025-01-06 NOTE — PROGRESS NOTES
Recent labs reviewed.  Liver enzymes are back to normal.  However cholesterol levels are significantly high since she is off statins for a while.    Recommend to start taking atorvastatin 20 mg nightly.  Please send the prescription if the patient is agreeable.  We will repeat a CMP and lipid panel in 3 months.      Electronically signed by Hema English MD, 01/06/25, 1:08 PM EST.

## 2025-01-07 ENCOUNTER — TELEPHONE (OUTPATIENT)
Dept: CARDIOLOGY | Facility: CLINIC | Age: 70
End: 2025-01-07
Payer: MEDICARE

## 2025-01-07 DIAGNOSIS — E78.2 MIXED HYPERLIPIDEMIA: Primary | ICD-10-CM

## 2025-01-07 RX ORDER — ATORVASTATIN CALCIUM 20 MG/1
20 TABLET, FILM COATED ORAL DAILY
Qty: 90 TABLET | Refills: 3 | Status: SHIPPED | OUTPATIENT
Start: 2025-01-07

## 2025-01-07 NOTE — TELEPHONE ENCOUNTER
NATASHA patient. Went over results, medication recommendations and fasting labs needed in 3 months. Patient was agreeable. Prescription sent, lab orders placed.

## 2025-01-07 NOTE — TELEPHONE ENCOUNTER
----- Message from Hema English sent at 1/6/2025  1:08 PM EST -----  Recent labs reviewed.  Liver enzymes are back to normal.  However cholesterol levels are significantly high since she is off statins for a while.    Recommend to start taking atorvastatin 20 mg nightly.  Please send the prescription if the patient is agreeable.  We will repeat a CMP and lipid panel in 3 months.      Electronically signed by Hema English MD, 01/06/25, 1:08 PM EST.

## 2025-01-17 ENCOUNTER — OFFICE VISIT (OUTPATIENT)
Dept: PULMONOLOGY | Facility: CLINIC | Age: 70
End: 2025-01-17
Payer: MEDICARE

## 2025-01-17 ENCOUNTER — TELEPHONE (OUTPATIENT)
Dept: PULMONOLOGY | Facility: CLINIC | Age: 70
End: 2025-01-17
Payer: MEDICARE

## 2025-01-17 VITALS
OXYGEN SATURATION: 95 % | WEIGHT: 152 LBS | BODY MASS INDEX: 30.64 KG/M2 | RESPIRATION RATE: 16 BRPM | HEIGHT: 59 IN | HEART RATE: 61 BPM | SYSTOLIC BLOOD PRESSURE: 117 MMHG | TEMPERATURE: 97.8 F | DIASTOLIC BLOOD PRESSURE: 60 MMHG

## 2025-01-17 DIAGNOSIS — R06.09 DYSPNEA ON EXERTION: ICD-10-CM

## 2025-01-17 DIAGNOSIS — J43.9 PULMONARY EMPHYSEMA, UNSPECIFIED EMPHYSEMA TYPE: Primary | ICD-10-CM

## 2025-01-17 DIAGNOSIS — R05.9 COUGH, UNSPECIFIED TYPE: ICD-10-CM

## 2025-01-17 DIAGNOSIS — F17.211 NICOTINE DEPENDENCE, CIGARETTES, IN REMISSION: ICD-10-CM

## 2025-01-17 PROCEDURE — 1160F RVW MEDS BY RX/DR IN RCRD: CPT | Performed by: NURSE PRACTITIONER

## 2025-01-17 PROCEDURE — 99214 OFFICE O/P EST MOD 30 MIN: CPT | Performed by: NURSE PRACTITIONER

## 2025-01-17 PROCEDURE — 1159F MED LIST DOCD IN RCRD: CPT | Performed by: NURSE PRACTITIONER

## 2025-01-17 RX ORDER — IPRATROPIUM BROMIDE AND ALBUTEROL SULFATE 2.5; .5 MG/3ML; MG/3ML
3 SOLUTION RESPIRATORY (INHALATION) EVERY 4 HOURS PRN
Qty: 360 ML | Refills: 3 | Status: SHIPPED | OUTPATIENT
Start: 2025-01-17

## 2025-01-17 RX ORDER — METHYLPREDNISOLONE 4 MG/1
TABLET ORAL
Qty: 21 TABLET | Refills: 0 | Status: SHIPPED | OUTPATIENT
Start: 2025-01-17

## 2025-01-17 RX ORDER — ALBUTEROL SULFATE 90 UG/1
2 INHALANT RESPIRATORY (INHALATION) EVERY 6 HOURS PRN
Qty: 18 G | Refills: 5 | Status: SHIPPED | OUTPATIENT
Start: 2025-01-17

## 2025-01-17 RX ORDER — CETIRIZINE HYDROCHLORIDE 10 MG/1
10 TABLET ORAL DAILY PRN
Qty: 90 TABLET | Refills: 3 | Status: SHIPPED | OUTPATIENT
Start: 2025-01-17

## 2025-01-17 RX ORDER — BUDESONIDE AND FORMOTEROL FUMARATE DIHYDRATE 80; 4.5 UG/1; UG/1
2 AEROSOL RESPIRATORY (INHALATION)
Qty: 10.2 G | Refills: 11 | Status: SHIPPED | OUTPATIENT
Start: 2025-01-17

## 2025-01-17 RX ORDER — BROMPHENIRAMINE MALEATE, PSEUDOEPHEDRINE HYDROCHLORIDE, AND DEXTROMETHORPHAN HYDROBROMIDE 2; 30; 10 MG/5ML; MG/5ML; MG/5ML
5 SYRUP ORAL 3 TIMES DAILY PRN
Qty: 118 ML | Refills: 1 | Status: SHIPPED | OUTPATIENT
Start: 2025-01-17

## 2025-01-17 NOTE — PROGRESS NOTES
Primary Care Provider  Lina Nguyen PA   Referring Provider  No ref. provider found    Patient Complaint  Follow-up and Cough    Patient or patient representative verbalized consent for the use of Ambient Listening during the visit with  ANA Martin for chart documentation. 1/17/2025  14:36 EST    Subjective       History of Presenting Illness  Veda Peña is a pleasant 69 y.o. female who presents to [facility] with history of COPD, former smoker, here for followup appointment. I last saw the patient 7/16/2024.  Her maintenance inhaler is Symbicort and she has albuterol DuoNeb as needed for shortness of air or wheeze. She also takes daily Zyrtec for allergy symptoms. Her recent LDCT was 12/30/2024 which revealed mild emphysema, no suspicious pulmonary nodules or findings to indicate malignancy in the chest. There is evidence of prior granulomatous disease. Annual LDCT has been ordered for 12/2025.    History of Present Illness  The patient is a 69-year-old female who presents for a 6-month follow-up.    She experienced an illness approximately 2 weeks ago, which progressively worsened over a period of 4 to 5 days. She tested negative for COVID-19 but did not undergo testing for influenza. She managed her symptoms at home with increased hydration and Mucinex. Her current medications include Symbicort, albuterol, DuoNeb as needed, and allergy medication. She is uncertain about the need for refills. She reports no oral thrush and uses her rescue inhaler only during episodes of coughing or physical activity. She has been adhering to the practice of rinsing her mouth post-Symbicort use. She reports an improvement in her condition compared to 2 to 3 weeks ago, although she continues to experience a dry cough. She reports no productive cough. She has been sleeping in a recliner due to discomfort when lying flat. She underwent a low-dose CT scan for  lung cancer screening on 2024. She has been using Mucinex and Delsym for her cough, which she describes as nonproductive and causing soreness. She does not believe she requires steroids. She has previously been prescribed prednisone during a hospital stay. She describes a sensation of something being lodged in the center of her chest. She finds relief with Mucinex but questions its necessity in the absence of mucus production.    MEDICATIONS  Symbicort, albuterol, DuoNeb, Zyrtec, Mucinex, Delsym.    At present time patient denies dyspnea, coughing, wheezing, headaches, chest pain, weight loss or hemoptysis. Patient denies fevers, chills and night sweats. Veda Peña is able to perform ADLs.      I have personally reviewed the review of systems, past family, social, medical and surgical histories; and agree with their findings.      Review of Systems   Constitutional: Negative.    HENT: Negative.     Respiratory:  Positive for cough.    Cardiovascular: Negative.    Musculoskeletal: Negative.    Neurological: Negative.    Psychiatric/Behavioral: Negative.           Family History   Problem Relation Age of Onset    Cancer Mother         Abdominal    Arthritis Mother     COPD Mother     Diabetes Mother     Heart disease Mother         Pacemaker,    Hypertension Mother     Cancer Father         Throat, prostate,  metastatic    Arthritis Father     Hypertension Father     Cancer Sister         Kidney    Cancer Maternal Grandmother         Pancreatic    Arthritis Maternal Grandmother     Diabetes Maternal Grandmother     Hypertension Maternal Grandmother         Stroke    Unknown Other     Unknown Other     Unknown Other     Unknown Other     Unknown Other     Thyroid disease Daughter             Arthritis Daughter     COPD Daughter     Diabetes Daughter     Vision loss Daughter         Glaucoma    Anxiety disorder Daughter         Anxiety-stress    Vision loss Daughter         Has glaucoma    Early death  Daughter         Thyroid    Thyroid disease Daughter             Cancer Paternal Grandmother         Leukemia- non hodgkin's lymphoma    Anxiety disorder Daughter         Anxiety    Arthritis Daughter     COPD Daughter     Diabetes Daughter     Cancer Brother         Non- hodgkin's lymphoma    Hypertension Maternal Aunt         Stroke    Learning disabilities Son         ADHD        Social History     Socioeconomic History    Marital status:    Tobacco Use    Smoking status: Former     Current packs/day: 0.00     Average packs/day: 2.0 packs/day for 40.0 years (80.0 ttl pk-yrs)     Types: Cigarettes, Electronic Cigarette     Start date: 3/26/1973     Quit date: 3/26/2013     Years since quittin.8     Passive exposure: Past    Smokeless tobacco: Never    Tobacco comments:     Stopped electronic cigarettes on 2022   Vaping Use    Vaping status: Former    Devices: Pre-filled or refillable cartridge, Refillable tank   Substance and Sexual Activity    Alcohol use: Not Currently     Comment: Haven't drank alcohol in 25+ years    Drug use: Never    Sexual activity: Not Currently     Partners: Male     Birth control/protection: Tubal ligation, Birth control pill, Hysterectomy        Past Medical History:   Diagnosis Date    Anemia     Anxiety     Arthritis     Asthma     Emphysema    Cancer -present    Skin    Cataract     Cataract surgery in both eyes    Cholelithiasis     Surgery    Chronic bronchitis     CKD (chronic kidney disease)     FOLLOWED BY PCP LAST BUN 16, CREAT 1 AND GFR 61.2023    Colon polyp     Colonoscopy, removed and biopsied    COPD (chronic obstructive pulmonary disease)     Coronary artery disease     Depression     Diabetes mellitus     Pre- diabetes    Elevated glucose 2021    Emphysema of lung     Fibromyalgia, primary     ?    GERD (gastroesophageal reflux disease)     Headache     History of 2019 novel coronavirus disease (COVID-19)  04/14/2021    HL (hearing loss) 2021    Hearing aids    Hyperlipidemia     Hypothyroidism     Irritable bowel syndrome     Constipation..ongoing    Low back pain 2022    Lower back, across hips, and down both legs    Neuropathic pain     Neuropathy     Obesity     Osteopenia     Peptic ulceration     Renal insufficiency     Rheumatoid arthritis     Scoliosis 2023    Awaiting decompression surgery    Shortness of breath on exertion     Stroke         Immunization History   Administered Date(s) Administered    Tdap 01/01/2020       Allergies   Allergen Reactions    Tramadol Nausea And Vomiting    Codeine Palpitations    Diazepam Anxiety    Fosfomycin Other (See Comments)     Vaginal burning      Iron Other (See Comments)     REPORTS CAUSES HER TO HAVE URINARY TRACT INFECTION    Nickel Hives, Swelling and Rash    Prednisone Unknown - Low Severity     REPORTS MAKES HER REALLY ANGRY AND MAD    Venlafaxine Nausea And Vomiting          Current Outpatient Medications:     albuterol sulfate  (90 Base) MCG/ACT inhaler, Inhale 2 puffs Every 6 (Six) Hours As Needed for Wheezing., Disp: 18 g, Rfl: 5    aspirin 81 MG EC tablet, Take 1 tablet by mouth Daily., Disp: 90 tablet, Rfl: 3    atorvastatin (LIPITOR) 20 MG tablet, Take 1 tablet by mouth Daily., Disp: 90 tablet, Rfl: 3    budesonide-formoterol (Symbicort) 80-4.5 MCG/ACT inhaler, Inhale 2 puffs 2 (Two) Times a Day., Disp: 10.2 g, Rfl: 11    cetirizine (zyrTEC) 10 MG tablet, Take 1 tablet by mouth Daily As Needed for Allergies., Disp: 90 tablet, Rfl: 3    estradiol (ESTRACE) 0.1 MG/GM vaginal cream, Use a pea sized amount vaginally twice weekly at hs, Disp: 42.5 g, Rfl: 12    gabapentin (NEURONTIN) 300 MG capsule, Take 1 capsule by mouth 2 (Two) Times a Day As Needed., Disp: , Rfl:     HYDROcodone-acetaminophen (NORCO)  MG per tablet, Take 1 tablet by mouth Every 6 (Six) Hours As Needed for Moderate Pain., Disp: , Rfl:     ipratropium-albuterol (DUO-NEB) 0.5-2.5  "mg/3 ml nebulizer, Take 3 mL by nebulization Every 4 (Four) Hours As Needed for Wheezing or Shortness of Air., Disp: 360 mL, Rfl: 3    levothyroxine sodium (TIROSINT) 50 MCG capsule, Take 1 capsule by mouth Daily., Disp: 90 capsule, Rfl: 1    oxybutynin XL (DITROPAN-XL) 10 MG 24 hr tablet, Take 1 tablet by mouth 2 (Two) Times a Day., Disp: 180 tablet, Rfl: 1    brompheniramine-pseudoephedrine-DM 30-2-10 MG/5ML syrup, Take 5 mL by mouth 3 (Three) Times a Day As Needed for Cough., Disp: 118 mL, Rfl: 1    methylPREDNISolone (MEDROL) 4 MG dose pack, Take as directed on package instructions., Disp: 21 tablet, Rfl: 0         Vital Signs   /60 (BP Location: Right arm, Patient Position: Sitting, Cuff Size: Adult)   Pulse 61   Temp 97.8 °F (36.6 °C)   Resp 16   Ht 149.9 cm (59\")   Wt 68.9 kg (152 lb)   SpO2 95%   BMI 30.70 kg/m²       Objective     Physical Exam  Vitals reviewed.   Constitutional:       Appearance: Normal appearance.   HENT:      Head: Normocephalic and atraumatic.      Nose: Nose normal.      Mouth/Throat:      Mouth: Mucous membranes are moist.      Pharynx: Oropharynx is clear.   Eyes:      Extraocular Movements: Extraocular movements intact.      Conjunctiva/sclera: Conjunctivae normal.      Pupils: Pupils are equal, round, and reactive to light.   Cardiovascular:      Rate and Rhythm: Normal rate and regular rhythm.      Pulses: Normal pulses.      Heart sounds: Normal heart sounds.   Pulmonary:      Effort: Pulmonary effort is normal.      Breath sounds: Normal breath sounds.   Abdominal:      General: Bowel sounds are normal.   Musculoskeletal:         General: Normal range of motion.      Cervical back: Normal range of motion and neck supple.   Skin:     General: Skin is warm and dry.   Neurological:      Mental Status: She is alert and oriented to person, place, and time.   Psychiatric:         Behavior: Behavior normal.         Results Review  I have personally reviewed the prior " office notes, hospital records, labs, and diagnostics.    CT Chest Low Dose Cancer Screening WO [[id number]] (Order [id number])  Order  Status: Final result     Study Notes     Sarah Knott on 12/30/2024  8:32 AM EST   LD lung screen. Hospitalized in 4/24 for pneumonia or bronchitis, thinks she has it again.  CTDI .67  DLP 24  MS     Appointment Information    PACS Images     Radiology Images  Study Result    Narrative & Impression   CT CHEST LOW DOSE CANCER SCREENING WO     Date of Exam: 12/30/2024 8:06 AM EST     Indication: Screening     .     Comparison: 4/29/2024 chest CT with contrast     Technique: Low dose CT imaging of the chest was performed without intravenous contrast enhancement.  Automated exposure control and iterative reconstruction methods were used.        Findings:        Lungs: There is mild emphysema. Densely calcified granuloma is present in the right lower lobe. There is linear scarring in the lingula and right middle lobe. No suspicious pulmonary nodules or consolidations. No bronchiectasis or abnormal bronchial wall   thickening. The central tracheobronchial tree is widely patent. No interstitial thickening.       Pleura:No pleural effusions or pneumothorax. No focal pleural abnormalities.     Mediastinum and carlos:No pathologically enlarged thoracic or axillary adenopathy.     Cardiovascular: The thoracic aorta is nonaneurysmal.  Heart size is within normal limits. Pulmonary vascularity iswithin normal limits. Coronary artery calcifications are present.     Thyroid gland:Unremarkable     Soft tissues:No significant findings. Postsurgical changes from cholecystectomy and previous gastric sleeve procedure.     Bones:No acute bony abnormality or aggressive appearing focal osseous lesions.              IMPRESSION:  Impression:  Mild emphysema. No suspicious pulmonary nodules or findings to indicate malignancy in the chest. There is evidence of prior granulomatous disease.      Recommendation:  Continue annual screening with LDCT     Lung Rads Assessment:  Lung-RADS L2 - Benign appearance or <1% chance of malignancy.            Electronically Signed: Nikunj Turner DO    12/30/2024 5:00 PM EST    Workstation ID: [id number]       Assessment         Patient Active Problem List   Diagnosis    Chronic kidney disease    COPD (chronic obstructive pulmonary disease)    Elevated glucose    GERD (gastroesophageal reflux disease)    Mixed hyperlipidemia    Hypothyroidism    Neuropathy    DDD (degenerative disc disease), cervical    Primary localized osteoarthrosis of right lower leg    Right knee pain    Fibromyalgia    Low back pain    Osteoarthritis of right knee    Class 1 obesity due to excess calories with body mass index (BMI) of 32.0 to 32.9 in adult    Non-occlusive coronary artery disease    COPD (chronic obstructive pulmonary disease) with acute bronchitis    Chronic respiratory failure        Plan     Diagnoses and all orders for this visit:    1. Pulmonary emphysema, unspecified emphysema type (Primary)  -     ipratropium-albuterol (DUO-NEB) 0.5-2.5 mg/3 ml nebulizer; Take 3 mL by nebulization Every 4 (Four) Hours As Needed for Wheezing or Shortness of Air.  Dispense: 360 mL; Refill: 3  -     albuterol sulfate  (90 Base) MCG/ACT inhaler; Inhale 2 puffs Every 6 (Six) Hours As Needed for Wheezing.  Dispense: 18 g; Refill: 5  -     budesonide-formoterol (Symbicort) 80-4.5 MCG/ACT inhaler; Inhale 2 puffs 2 (Two) Times a Day.  Dispense: 10.2 g; Refill: 11  -     cetirizine (zyrTEC) 10 MG tablet; Take 1 tablet by mouth Daily As Needed for Allergies.  Dispense: 90 tablet; Refill: 3  -     methylPREDNISolone (MEDROL) 4 MG dose pack; Take as directed on package instructions.  Dispense: 21 tablet; Refill: 0    2. Nicotine dependence, cigarettes, in remission  -     ipratropium-albuterol (DUO-NEB) 0.5-2.5 mg/3 ml nebulizer; Take 3 mL by nebulization Every 4 (Four) Hours As Needed for  Wheezing or Shortness of Air.  Dispense: 360 mL; Refill: 3  -     albuterol sulfate  (90 Base) MCG/ACT inhaler; Inhale 2 puffs Every 6 (Six) Hours As Needed for Wheezing.  Dispense: 18 g; Refill: 5  -     budesonide-formoterol (Symbicort) 80-4.5 MCG/ACT inhaler; Inhale 2 puffs 2 (Two) Times a Day.  Dispense: 10.2 g; Refill: 11  -     cetirizine (zyrTEC) 10 MG tablet; Take 1 tablet by mouth Daily As Needed for Allergies.  Dispense: 90 tablet; Refill: 3    3. Dyspnea on exertion  -     ipratropium-albuterol (DUO-NEB) 0.5-2.5 mg/3 ml nebulizer; Take 3 mL by nebulization Every 4 (Four) Hours As Needed for Wheezing or Shortness of Air.  Dispense: 360 mL; Refill: 3  -     albuterol sulfate  (90 Base) MCG/ACT inhaler; Inhale 2 puffs Every 6 (Six) Hours As Needed for Wheezing.  Dispense: 18 g; Refill: 5  -     budesonide-formoterol (Symbicort) 80-4.5 MCG/ACT inhaler; Inhale 2 puffs 2 (Two) Times a Day.  Dispense: 10.2 g; Refill: 11  -     cetirizine (zyrTEC) 10 MG tablet; Take 1 tablet by mouth Daily As Needed for Allergies.  Dispense: 90 tablet; Refill: 3  -     methylPREDNISolone (MEDROL) 4 MG dose pack; Take as directed on package instructions.  Dispense: 21 tablet; Refill: 0    4. Cough, unspecified type  -     brompheniramine-pseudoephedrine-DM 30-2-10 MG/5ML syrup; Take 5 mL by mouth 3 (Three) Times a Day As Needed for Cough.  Dispense: 118 mL; Refill: 1  -     methylPREDNISolone (MEDROL) 4 MG dose pack; Take as directed on package instructions.  Dispense: 21 tablet; Refill: 0           Assessment & Plan  1. Chronic obstructive pulmonary disease.  Her condition has shown significant improvement over the past 2 to 3 weeks. The persistent dry cough may be attributed to underlying inflammation. A comprehensive refill of all her medications has been provided, ensuring a sufficient supply for the upcoming year. She is advised to continue her current regimen of Symbicort, albuterol, DuoNeb as needed, and  allergy medication. A prescription for Bromfed has been issued, with instructions to take 5 mL every 8 hours as needed for cough relief. Additionally, a steroid Dosepak has been prescribed to address the potential inflammation causing the cough.    Follow-up  The patient will follow up in 1 year, or earlier if necessary.      Smoking status:  reports that she quit smoking about 11 years ago. Her smoking use included cigarettes and electronic cigarette. She started smoking about 51 years ago. She has a 80 pack-year smoking history. She has been exposed to tobacco smoke. She has never used smokeless tobacco.    Vaccination status: Reviewed  Immunization History   Administered Date(s) Administered    Tdap 01/01/2020        Medications personally reviewed    Follow Up  Return in about 1 year (around 1/17/2026).    Patient was given instructions and counseling regarding her condition or for health maintenance advice. Please see specific information pulled into the AVS if appropriate.     I spent 15 minutes caring for Veda Peña on this date of service. This time includes time spent by me in the following activities:preparing for the visit, reviewing tests, obtaining and/or reviewing a separately obtained history, performing a medically appropriate examination and/or evaluation, counseling and educating the patient/family/caregiver, ordering medications, tests, or procedures, documenting information in the medical record, independently interpreting results and communicating that information with the patient/family/caregiver and answered questions family members, discuss medications.

## 2025-01-17 NOTE — TELEPHONE ENCOUNTER
Patient called and stated that the RX cough syrup that Dulce sent in was too expensive and she is wanting to know if there is something else she wants her to take.

## 2025-01-22 ENCOUNTER — TELEPHONE (OUTPATIENT)
Dept: PULMONOLOGY | Facility: CLINIC | Age: 70
End: 2025-01-22

## 2025-01-22 DIAGNOSIS — J43.9 PULMONARY EMPHYSEMA, UNSPECIFIED EMPHYSEMA TYPE: Primary | ICD-10-CM

## 2025-01-22 RX ORDER — FLUTICASONE PROPIONATE AND SALMETEROL 250; 50 UG/1; UG/1
1 POWDER RESPIRATORY (INHALATION)
Qty: 60 EACH | Refills: 11 | Status: SHIPPED | OUTPATIENT
Start: 2025-01-22

## 2025-01-22 NOTE — TELEPHONE ENCOUNTER
Caller: [facility] ([place]) - [place], [place] - [address] [facility] [address] - [phone] [facility] [phone]     Relationship to patient: Pharmacy    Best call back number: [phone]    Patient is needing: PT'S PHARMACY CALLED AND SAID THE budesonide-formoterol (Symbicort) 80-4.5 MCG/ACT inhaler IS NOW NON FORMULARY AND THE ADVAIR IS NOW WHAT'S COVERED BY HER INSURANCE.

## 2025-04-24 ENCOUNTER — LAB (OUTPATIENT)
Dept: LAB | Facility: HOSPITAL | Age: 70
End: 2025-04-24
Payer: MEDICARE

## 2025-04-24 ENCOUNTER — OFFICE VISIT (OUTPATIENT)
Dept: FAMILY MEDICINE CLINIC | Facility: CLINIC | Age: 70
End: 2025-04-24
Payer: MEDICARE

## 2025-04-24 VITALS
HEART RATE: 51 BPM | HEIGHT: 59 IN | DIASTOLIC BLOOD PRESSURE: 51 MMHG | RESPIRATION RATE: 14 BRPM | WEIGHT: 161.6 LBS | OXYGEN SATURATION: 100 % | SYSTOLIC BLOOD PRESSURE: 126 MMHG | BODY MASS INDEX: 32.58 KG/M2

## 2025-04-24 DIAGNOSIS — Z00.00 MEDICARE ANNUAL WELLNESS VISIT, SUBSEQUENT: ICD-10-CM

## 2025-04-24 DIAGNOSIS — K59.09 CHRONIC CONSTIPATION: ICD-10-CM

## 2025-04-24 DIAGNOSIS — E55.9 VITAMIN D DEFICIENCY: ICD-10-CM

## 2025-04-24 DIAGNOSIS — R79.9 ABNORMAL BLOOD CHEMISTRY: ICD-10-CM

## 2025-04-24 DIAGNOSIS — H61.23 BILATERAL IMPACTED CERUMEN: ICD-10-CM

## 2025-04-24 DIAGNOSIS — N32.81 OAB (OVERACTIVE BLADDER): Chronic | ICD-10-CM

## 2025-04-24 DIAGNOSIS — E78.5 HYPERLIPIDEMIA, UNSPECIFIED HYPERLIPIDEMIA TYPE: Chronic | ICD-10-CM

## 2025-04-24 DIAGNOSIS — E03.9 HYPOTHYROIDISM, UNSPECIFIED TYPE: Chronic | ICD-10-CM

## 2025-04-24 DIAGNOSIS — Z12.11 ENCOUNTER FOR SCREENING FOR MALIGNANT NEOPLASM OF COLON: ICD-10-CM

## 2025-04-24 DIAGNOSIS — G47.00 INSOMNIA, UNSPECIFIED TYPE: Primary | ICD-10-CM

## 2025-04-24 DIAGNOSIS — R73.09 ELEVATED GLUCOSE: ICD-10-CM

## 2025-04-24 DIAGNOSIS — Z80.0 FAMILY HISTORY OF COLON CANCER REQUIRING SCREENING COLONOSCOPY: ICD-10-CM

## 2025-04-24 DIAGNOSIS — D64.9 LOW HEMOGLOBIN: ICD-10-CM

## 2025-04-24 LAB
25(OH)D3 SERPL-MCNC: 42.6 NG/ML (ref 30–100)
ALBUMIN SERPL-MCNC: 4.2 G/DL (ref 3.5–5.2)
ALBUMIN UR-MCNC: <1.2 MG/DL
ALBUMIN/GLOB SERPL: 1.4 G/DL
ALP SERPL-CCNC: 82 U/L (ref 39–117)
ALT SERPL W P-5'-P-CCNC: 13 U/L (ref 1–33)
ANION GAP SERPL CALCULATED.3IONS-SCNC: 8.6 MMOL/L (ref 5–15)
AST SERPL-CCNC: 22 U/L (ref 1–32)
BASOPHILS # BLD AUTO: 0.05 10*3/MM3 (ref 0–0.2)
BASOPHILS NFR BLD AUTO: 0.7 % (ref 0–1.5)
BILIRUB SERPL-MCNC: 0.5 MG/DL (ref 0–1.2)
BUN SERPL-MCNC: 12 MG/DL (ref 8–23)
BUN/CREAT SERPL: 13.2 (ref 7–25)
CALCIUM SPEC-SCNC: 9.5 MG/DL (ref 8.6–10.5)
CHLORIDE SERPL-SCNC: 107 MMOL/L (ref 98–107)
CHOLEST SERPL-MCNC: 125 MG/DL (ref 0–200)
CO2 SERPL-SCNC: 25.4 MMOL/L (ref 22–29)
CREAT SERPL-MCNC: 0.91 MG/DL (ref 0.57–1)
CREAT UR-MCNC: 62.1 MG/DL
DEPRECATED RDW RBC AUTO: 41.8 FL (ref 37–54)
EGFRCR SERPLBLD CKD-EPI 2021: 68.4 ML/MIN/1.73
EOSINOPHIL # BLD AUTO: 0.2 10*3/MM3 (ref 0–0.4)
EOSINOPHIL NFR BLD AUTO: 2.9 % (ref 0.3–6.2)
ERYTHROCYTE [DISTWIDTH] IN BLOOD BY AUTOMATED COUNT: 14.8 % (ref 12.3–15.4)
GLOBULIN UR ELPH-MCNC: 3 GM/DL
GLUCOSE SERPL-MCNC: 87 MG/DL (ref 65–99)
HBA1C MFR BLD: 6 % (ref 4.8–5.6)
HCT VFR BLD AUTO: 36.8 % (ref 34–46.6)
HDLC SERPL-MCNC: 44 MG/DL (ref 40–60)
HGB BLD-MCNC: 11.4 G/DL (ref 12–15.9)
IMM GRANULOCYTES # BLD AUTO: 0.05 10*3/MM3 (ref 0–0.05)
IMM GRANULOCYTES NFR BLD AUTO: 0.7 % (ref 0–0.5)
LDLC SERPL CALC-MCNC: 63 MG/DL (ref 0–100)
LDLC/HDLC SERPL: 1.41 {RATIO}
LYMPHOCYTES # BLD AUTO: 2.93 10*3/MM3 (ref 0.7–3.1)
LYMPHOCYTES NFR BLD AUTO: 41.9 % (ref 19.6–45.3)
MCH RBC QN AUTO: 24.3 PG (ref 26.6–33)
MCHC RBC AUTO-ENTMCNC: 31 G/DL (ref 31.5–35.7)
MCV RBC AUTO: 78.3 FL (ref 79–97)
MICROALBUMIN/CREAT UR: NORMAL MG/G{CREAT}
MONOCYTES # BLD AUTO: 0.49 10*3/MM3 (ref 0.1–0.9)
MONOCYTES NFR BLD AUTO: 7 % (ref 5–12)
NEUTROPHILS NFR BLD AUTO: 3.28 10*3/MM3 (ref 1.7–7)
NEUTROPHILS NFR BLD AUTO: 46.8 % (ref 42.7–76)
NRBC BLD AUTO-RTO: 0 /100 WBC (ref 0–0.2)
PLATELET # BLD AUTO: 257 10*3/MM3 (ref 140–450)
PMV BLD AUTO: 11.6 FL (ref 6–12)
POTASSIUM SERPL-SCNC: 4.9 MMOL/L (ref 3.5–5.2)
PROT SERPL-MCNC: 7.2 G/DL (ref 6–8.5)
RBC # BLD AUTO: 4.7 10*6/MM3 (ref 3.77–5.28)
SODIUM SERPL-SCNC: 141 MMOL/L (ref 136–145)
T4 FREE SERPL-MCNC: 1.06 NG/DL (ref 0.92–1.68)
TRIGL SERPL-MCNC: 95 MG/DL (ref 0–150)
TSH SERPL DL<=0.05 MIU/L-ACNC: 2.92 UIU/ML (ref 0.27–4.2)
VLDLC SERPL-MCNC: 18 MG/DL (ref 5–40)
WBC NRBC COR # BLD AUTO: 7 10*3/MM3 (ref 3.4–10.8)

## 2025-04-24 PROCEDURE — 82306 VITAMIN D 25 HYDROXY: CPT

## 2025-04-24 PROCEDURE — 82570 ASSAY OF URINE CREATININE: CPT

## 2025-04-24 PROCEDURE — 80053 COMPREHEN METABOLIC PANEL: CPT

## 2025-04-24 PROCEDURE — 84466 ASSAY OF TRANSFERRIN: CPT

## 2025-04-24 PROCEDURE — 83036 HEMOGLOBIN GLYCOSYLATED A1C: CPT

## 2025-04-24 PROCEDURE — 82043 UR ALBUMIN QUANTITATIVE: CPT

## 2025-04-24 PROCEDURE — 84439 ASSAY OF FREE THYROXINE: CPT

## 2025-04-24 PROCEDURE — 80061 LIPID PANEL: CPT

## 2025-04-24 PROCEDURE — 36415 COLL VENOUS BLD VENIPUNCTURE: CPT

## 2025-04-24 PROCEDURE — 85025 COMPLETE CBC W/AUTO DIFF WBC: CPT

## 2025-04-24 PROCEDURE — 84443 ASSAY THYROID STIM HORMONE: CPT

## 2025-04-24 PROCEDURE — 83540 ASSAY OF IRON: CPT

## 2025-04-24 RX ORDER — LEVOTHYROXINE SODIUM 50 UG/1
50 CAPSULE ORAL DAILY
Qty: 90 CAPSULE | Refills: 1 | Status: SHIPPED | OUTPATIENT
Start: 2025-04-24

## 2025-04-24 RX ORDER — OXYBUTYNIN CHLORIDE 10 MG/1
10 TABLET, EXTENDED RELEASE ORAL 2 TIMES DAILY
Qty: 180 TABLET | Refills: 1 | Status: SHIPPED | OUTPATIENT
Start: 2025-04-24

## 2025-04-24 RX ORDER — TRAZODONE HYDROCHLORIDE 50 MG/1
50 TABLET ORAL NIGHTLY
Qty: 90 TABLET | Refills: 0 | Status: SHIPPED | OUTPATIENT
Start: 2025-04-24

## 2025-04-24 NOTE — PROGRESS NOTES
Subjective   The ABCs of the Annual Wellness Visit  Medicare Wellness Visit      Veda Peña is a 69 y.o. patient who presents for a Medicare Wellness Visit.    The following portions of the patient's history were reviewed and   updated as appropriate: allergies, current medications, past family history, past medical history, past social history, past surgical history, and problem list.    Compared to one year ago, the patient's physical   health is the same.  Compared to one year ago, the patient's mental   health is worse.    Recent Hospitalizations:  This patient has had a [facility] admission record on file within the last 365 days.  Current Medical Providers:  Patient Care Team:  Lina Nguyen PA as PCP - General (Family Medicine)  Pam Arroyo APRN as Nurse Practitioner (Urology)  Hema English MD as Consulting Physician (Cardiology)    Outpatient Medications Prior to Visit   Medication Sig Dispense Refill    albuterol sulfate  (90 Base) MCG/ACT inhaler Inhale 2 puffs Every 6 (Six) Hours As Needed for Wheezing. 18 g 5    aspirin 81 MG EC tablet Take 1 tablet by mouth Daily. 90 tablet 3    atorvastatin (LIPITOR) 20 MG tablet Take 1 tablet by mouth Daily. 90 tablet 3    cetirizine (zyrTEC) 10 MG tablet Take 1 tablet by mouth Daily As Needed for Allergies. 90 tablet 3    estradiol (ESTRACE) 0.1 MG/GM vaginal cream Use a pea sized amount vaginally twice weekly at hs 42.5 g 12    Fluticasone-Salmeterol (ADVAIR/WIXELA) 250-50 MCG/ACT DISKUS Inhale 1 puff 2 (Two) Times a Day. 60 each 11    gabapentin (NEURONTIN) 300 MG capsule Take 1 capsule by mouth 2 (Two) Times a Day As Needed.      HYDROcodone-acetaminophen (NORCO)  MG per tablet Take 1 tablet by mouth Every 6 (Six) Hours As Needed for Moderate Pain.      ipratropium-albuterol (DUO-NEB) 0.5-2.5 mg/3 ml nebulizer Take 3 mL by nebulization Every 4 (Four) Hours As Needed for Wheezing or Shortness of Air. 360 mL 3     levothyroxine sodium (TIROSINT) 50 MCG capsule Take 1 capsule by mouth Daily. 90 capsule 1    oxybutynin XL (DITROPAN-XL) 10 MG 24 hr tablet Take 1 tablet by mouth 2 (Two) Times a Day. 180 tablet 1    brompheniramine-pseudoephedrine-DM 30-2-10 MG/5ML syrup Take 5 mL by mouth 3 (Three) Times a Day As Needed for Cough. 118 mL 1    methylPREDNISolone (MEDROL) 4 MG dose pack Take as directed on package instructions. 21 tablet 0     No facility-administered medications prior to visit.     Opioid medication/s are on active medication list.  and I have evaluated her active treatment plan and pain score trends (see table).  Vitals:    04/24/25 0937   PainSc: 4    PainLoc: Neck     I have reviewed the chart for potential of high risk medication and harmful drug interactions in the elderly.        Aspirin is on active medication list. Aspirin use is indicated based on review of current medical condition/s. Pros and cons of this therapy have been discussed today. Benefits of this medication outweigh potential harm.  Patient has been encouraged to continue taking this medication.  .      Patient Active Problem List   Diagnosis    Chronic kidney disease    COPD (chronic obstructive pulmonary disease)    Elevated glucose    GERD (gastroesophageal reflux disease)    Mixed hyperlipidemia    Hypothyroidism    Neuropathy    DDD (degenerative disc disease), cervical    Primary localized osteoarthrosis of right lower leg    Right knee pain    Fibromyalgia    Low back pain    Osteoarthritis of right knee    Class 1 obesity due to excess calories with body mass index (BMI) of 32.0 to 32.9 in adult    Non-occlusive coronary artery disease    COPD (chronic obstructive pulmonary disease) with acute bronchitis    Chronic respiratory failure     Advance Care Planning Advance Directive is on file.  ACP discussion was held with the patient during this visit. Patient has an advance directive in EMR which is still valid.             Objective  "  Vitals:    25 0937   BP: 126/51   BP Location: Left arm   Patient Position: Sitting   Cuff Size: Large Adult   Pulse: 51   Resp: 14   SpO2: 100%   Weight: 73.3 kg (161 lb 9.6 oz)   Height: 149.9 cm (59\")   PainSc: 4    PainLoc: Neck       Estimated body mass index is 32.64 kg/m² as calculated from the following:    Height as of this encounter: 149.9 cm (59\").    Weight as of this encounter: 73.3 kg (161 lb 9.6 oz).                Does the patient have evidence of cognitive impairment? No     Ear Cerumen Removal    Date/Time: 2025 10:37 AM    Performed by: Lina Nguyen PA  Authorized by: Lina Nguyen PA    Anesthesia:  Local Anesthetic: none  Location details: left ear and right ear  Patient tolerance: patient tolerated the procedure well with no immediate complications  Procedure type: instrumentation, irrigation   Sedation:  Patient sedated: no                                                                                                  Health  Risk Assessment    Smoking Status:  Social History     Tobacco Use   Smoking Status Former    Current packs/day: 0.00    Average packs/day: 2.0 packs/day for 40.0 years (80.0 ttl pk-yrs)    Types: Cigarettes, Electronic Cigarette    Start date: 3/26/1973    Quit date: 3/26/2013    Years since quittin.0    Passive exposure: Past   Smokeless Tobacco Never   Tobacco Comments    Stopped electronic cigarettes on 2022     Alcohol Consumption:  Social History     Substance and Sexual Activity   Alcohol Use Not Currently    Comment: Haven't drank alcohol in 25+ years       Fall Risk Screen  STEADI Fall Risk Assessment was completed, and patient is at LOW risk for falls.Assessment completed on:2025    Depression Screening   Little interest or pleasure in doing things? Not at all   Feeling down, depressed, or hopeless? Several days   PHQ-2 Total Score 1      Health Habits and Functional and Cognitive Screenin/17/2025     " 7:49 AM   Functional & Cognitive Status   Do you have difficulty preparing food and eating? No   Do you have difficulty bathing yourself, getting dressed or grooming yourself? No   Do you have difficulty using the toilet? Yes   Do you have difficulty moving around from place to place? No   Do you have trouble with steps or getting out of a bed or a chair? No   Current Diet Unhealthy Diet   Dental Exam Up to date   Eye Exam Up to date   Exercise (times per week) 3 times per week   Current Exercises Include Gardening;House Cleaning;Other   Do you need help using the phone?  No   Are you deaf or do you have serious difficulty hearing?  Yes   Do you need help to go to places out of walking distance? No   Do you need help shopping? No   Do you need help preparing meals?  No   Do you need help with housework?  No   Do you need help with laundry? No   Do you need help taking your medications? No   Do you need help managing money? No   Do you ever drive or ride in a car without wearing a seat belt? No   Have you felt unusual stress, anger or loneliness in the last month? Yes   Who do you live with? Alone   If you need help, do you have trouble finding someone available to you? No   Have you been bothered in the last four weeks by sexual problems? No   Do you have difficulty concentrating, remembering or making decisions? No           Age-appropriate Screening Schedule:  Refer to the list below for future screening recommendations based on patient's age, sex and/or medical conditions. Orders for these recommended tests are listed in the plan section. The patient has been provided with a written plan.    Health Maintenance List  Health Maintenance   Topic Date Due    DIABETIC FOOT EXAM  Never done    DIABETIC EYE EXAM  Never done    URINE MICROALBUMIN-CREATININE RATIO (uACR)  Never done    HEMOGLOBIN A1C  06/30/2025    COLORECTAL CANCER SCREENING  09/23/2025    COVID-19 Vaccine (1 - 2024-25 season) 05/08/2025 (Originally  9/1/2024)    Pneumococcal Vaccine 50+ (1 of 2 - PCV) 10/21/2025 (Originally 5/28/1974)    ZOSTER VACCINE (1 of 2) 10/21/2025 (Originally 5/28/2005)    INFLUENZA VACCINE  07/01/2025    LIPID PANEL  12/30/2025    LUNG CANCER SCREENING  12/30/2025    ANNUAL WELLNESS VISIT  04/24/2026    MAMMOGRAM  06/25/2026    DXA SCAN  12/30/2026    TDAP/TD VACCINES (2 - Td or Tdap) 01/01/2030    HEPATITIS C SCREENING  Completed                                                                                                                                                CMS Preventative Services Quick Reference  Risk Factors Identified During Encounter  None Identified    The above risks/problems have been discussed with the patient.  Pertinent information has been shared with the patient in the After Visit Summary.  An After Visit Summary and PPPS were made available to the patient.    Follow Up:   Next Medicare Wellness visit to be scheduled in 1 year.         Additional E&M Note during same encounter follows:  Patient has additional, significant, and separately identifiable condition(s)/problem(s) that require work above and beyond the Medicare Wellness Visit     Chief Complaint  Follow-up (6 month ) and Medicare Wellness-subsequent    Subjective   HPI  Veda is also being seen today for additional medical problem/s.        Hypothyroidism: Patient is currently on Levothyroxine and doing well. Patient states energy is good. Patient denies weight changes, bowel changes and changes in hair, skin and nails.    OAB: Patient is currently on Oxybutynin for overactive bladder. Patient states the medication is working well. Patient denies urinary frequency and leakage. Patient denies any adverse effects from medication.    HL: Patient presents for management of hyperlipidemia. Patient is currently on Atovastatin. Patient denies muscle cramps and muscle weakness. Patient is monitoring diet to help lower lipids.    Patient had hearing aids  "and was told had wax in her ear canals; requesting evaluation.    Patient is having difficulty sleeping; patient has been helping her sister with a broken leg who developed a staph infection and also a brother with colon cancer; [facility]  Patient tried melatonin without help.  Has not tried any other medications.  Patient is fatigued however once she lays in bed, states her mind thinks about things.    Pt is established with pain management.     Colon screen--cologuard 9/22/22; 2018 normal colonoscopy; patient has chronic constipation--takes vegetable laxative daily; still 7-10 days without bm. Brother recently dx with colon cancer.    Patient has been erroneously marked as diabetic. Based on the available clinical information, she does not have diabetes and should therefore be excluded from diabetic health maintenance and quality measures for the remainder of the reporting period. Last A1c us 6.0 on 12/30/24      Objective   Vital Signs:  /51 (BP Location: Left arm, Patient Position: Sitting, Cuff Size: Large Adult)   Pulse 51   Resp 14   Ht 149.9 cm (59\")   Wt 73.3 kg (161 lb 9.6 oz)   SpO2 100%   BMI 32.64 kg/m²   Physical Exam  Vitals and nursing note reviewed.   Constitutional:       Appearance: Normal appearance. She is obese.   HENT:      Head: Normocephalic and atraumatic.      Right Ear: There is impacted cerumen.      Left Ear: There is impacted cerumen.   Neck:      Vascular: No carotid bruit.      Comments: Thyroid : gland size normal, nontender, no nodules or masses present on palpation   Cardiovascular:      Rate and Rhythm: Normal rate and regular rhythm.      Pulses: Normal pulses.      Heart sounds: Normal heart sounds.   Pulmonary:      Effort: Pulmonary effort is normal.      Breath sounds: Normal breath sounds.   Abdominal:      Palpations: Abdomen is soft.      Tenderness: There is no abdominal tenderness.   Musculoskeletal:      Cervical back: Neck supple. No " tenderness.      Right lower leg: No edema.      Left lower leg: No edema.   Lymphadenopathy:      Cervical: No cervical adenopathy.   Neurological:      Mental Status: She is alert.   Psychiatric:         Mood and Affect: Mood normal.         Behavior: Behavior normal.                    Assessment and Plan     Diagnoses and all orders for this visit:    1. Insomnia, unspecified type (Primary)  Comments:  New problem: trial of trazodone 50mg nightly prn; admin and side effects discussed.  Orders:  -     traZODone (DESYREL) 50 MG tablet; Take 1 tablet by mouth Every Night.  Dispense: 90 tablet; Refill: 0    2. Elevated glucose  Assessment & Plan:    Orders:    Comprehensive Metabolic Panel; Future    Hemoglobin A1c; Future    Microalbumin / Creatinine Urine Ratio - Urine, Clean Catch; Future      Orders:  -     Comprehensive Metabolic Panel; Future  -     Hemoglobin A1c; Future  -     Microalbumin / Creatinine Urine Ratio - Urine, Clean Catch; Future    3. Hyperlipidemia, unspecified hyperlipidemia type  Comments:  No longer on Zocor; check labs for stability.  Orders:  -     Comprehensive Metabolic Panel; Future  -     Lipid Panel; Future    4. Hypothyroidism, unspecified type  Comments:  Currently stable: will continue with Levothyroxine 50mcg daily; check labs and follow up in 6mths  Assessment & Plan:    Orders:    TSH; Future    T4, Free; Future    levothyroxine sodium (TIROSINT) 50 MCG capsule; Take 1 capsule by mouth Daily.      Orders:  -     TSH; Future  -     T4, Free; Future  -     levothyroxine sodium (TIROSINT) 50 MCG capsule; Take 1 capsule by mouth Daily.  Dispense: 90 capsule; Refill: 1    5. Abnormal blood chemistry  -     CBC & Differential; Future    6. Vitamin D deficiency  Comments:  Unsure of stability  Orders:  -     Vitamin D,25-Hydroxy; Future    7. Encounter for screening for malignant neoplasm of colon    8. Family history of colon cancer requiring screening colonoscopy  -     Ambulatory  Referral to Gastroenterology    9. Chronic constipation  Comments:  Referral to GI for CLN and to discuss further.  Orders:  -     Ambulatory Referral to Gastroenterology    10. OAB (overactive bladder)  Comments:  Stable on Oxybutynin XL 10mg daily; check ua and follow up in 6mths.  Orders:  -     oxybutynin XL (DITROPAN-XL) 10 MG 24 hr tablet; Take 1 tablet by mouth 2 (Two) Times a Day.  Dispense: 180 tablet; Refill: 1    11. Bilateral impacted cerumen  -     Ear Cerumen Removal    12. Medicare annual wellness visit, subsequent           Elevated glucose    Orders:    Comprehensive Metabolic Panel; Future    Hemoglobin A1c; Future    Microalbumin / Creatinine Urine Ratio - Urine, Clean Catch; Future    Hyperlipidemia, unspecified hyperlipidemia type       Orders:    Comprehensive Metabolic Panel; Future    Lipid Panel; Future    Hypothyroidism, unspecified type    Orders:    TSH; Future    T4, Free; Future    levothyroxine sodium (TIROSINT) 50 MCG capsule; Take 1 capsule by mouth Daily.    Abnormal blood chemistry    Orders:    CBC & Differential; Future    Vitamin D deficiency    Orders:    Vitamin D,25-Hydroxy; Future    Insomnia, unspecified type    Orders:    traZODone (DESYREL) 50 MG tablet; Take 1 tablet by mouth Every Night.    Encounter for screening for malignant neoplasm of colon         Family history of colon cancer requiring screening colonoscopy    Orders:    Ambulatory Referral to Gastroenterology    Chronic constipation    Orders:    Ambulatory Referral to Gastroenterology    OAB (overactive bladder)    Orders:    oxybutynin XL (DITROPAN-XL) 10 MG 24 hr tablet; Take 1 tablet by mouth 2 (Two) Times a Day.    Bilateral impacted cerumen    Orders:    Ear Cerumen Removal    Medicare annual wellness visit, subsequent                 Follow Up   Return in about 6 months (around 10/24/2025).  Patient was given instructions and counseling regarding her condition or for health maintenance advice. Please see  specific information pulled into the AVS if appropriate.

## 2025-04-24 NOTE — ASSESSMENT & PLAN NOTE
Orders:    Comprehensive Metabolic Panel; Future    Hemoglobin A1c; Future    Microalbumin / Creatinine Urine Ratio - Urine, Clean Catch; Future

## 2025-04-24 NOTE — ASSESSMENT & PLAN NOTE
Orders:    TSH; Future    T4, Free; Future    levothyroxine sodium (TIROSINT) 50 MCG capsule; Take 1 capsule by mouth Daily.

## 2025-04-25 ENCOUNTER — RESULTS FOLLOW-UP (OUTPATIENT)
Dept: LAB | Facility: HOSPITAL | Age: 70
End: 2025-04-25
Payer: MEDICARE

## 2025-04-25 DIAGNOSIS — D64.9 LOW HEMOGLOBIN: Primary | ICD-10-CM

## 2025-04-25 LAB
IRON 24H UR-MRATE: 61 MCG/DL (ref 37–145)
IRON SATN MFR SERPL: 18 % (ref 20–50)
TIBC SERPL-MCNC: 337 MCG/DL (ref 298–536)
TRANSFERRIN SERPL-MCNC: 226 MG/DL (ref 200–360)

## 2025-05-06 NOTE — PROGRESS NOTES
Chief Complaint: Recurrent UTI    Subjective         History of Present Illness  Veda Peña is a 69 y.o. female presents to [facility] to be seen for follow-up.      Patient was previously seen by me with last visit on 11/7/2024 for recurrent UTI and GSM.  She was advised at that visit to continue on her vaginal estrogen cream.  She is here for follow-up.  History of Present Illness  The patient is a 69-year-old female who presents for f/u of GSM/recurrent UTI    She reports experiencing initial symptoms suggestive of a UTI, which she attributes to the consumption of an orange the previous night.     She has not had any UTIs since her last visit      She continues to utilize vaginal estrogen cream twice weekly as part of her treatment regimen.    MEDICATIONS  Vaginal estrogen cream.      Previous 11/7/2024:  At her previous visit she was started on vaginal estrogen cream.  She is also taking oxybutynin.  She was also encouraged to contact our office anytime that she had symptoms of UTI so that we could order a urine culture.     She reports that she is doing well on her medications. She states that she has not had any UTIs since her last visit.  She did have a couple of days where her bladder felt irritated after eating high sugar foods and drinking soda.  She states those symptoms resolved within 24 hours or less.       Previous 8/7/2024  Patient presents reporting she has been having problems with recurrent UTIs. She had never had a UTI until age 60. She reports she gets urgency, burning and dribbles with her infections. No symptoms today.      Frequency-takes oxybutynin helps with this     Urgency-admits occasionally     Incontinence-admits with urgency if unable to get to restroom on time     Nocturia-2-3     GH-denies     History of stones-denies      surgeries-denies     Family history of  malignancy-sister- kidney CA, MGM- kidney CA     Cardiopulmonary-COPD, CAD      Anticoagulants-ASA 81 mg     Smoker-denies, quit 2013, quit vaping 2022     Urine culture  6/4/2024 greater than 100,000 colony forming units/mL of Enterococcus faecalis pansensitive  5/9/2024 greater than 100,000 colony forming units per mL of Enterococcus faecalis pansensitive  4/4/2024 50,000 colony-forming's per mL of E. coli pansensitive       Objective     Past Medical History:   Diagnosis Date    Anemia 2021    Anxiety 2015    Arthritis     Asthma 2021    Emphysema    Cancer 2021-present    Skin    Cataract 2020    Cataract surgery in both eyes    Cholelithiasis     Surgery    Chronic bronchitis     CKD (chronic kidney disease)     FOLLOWED BY PCP LAST BUN 16, CREAT 1 AND GFR 61.5 NOV 2023    Colon polyp     Colonoscopy, removed and biopsied    COPD (chronic obstructive pulmonary disease)     Coronary artery disease     Depression     Diabetes mellitus 2023    Pre- diabetes    Elevated glucose 04/14/2021    Emphysema of lung     Fibromyalgia, primary     ?    GERD (gastroesophageal reflux disease)     Headache 2020    History of 2019 novel coronavirus disease (COVID-19) 04/14/2021    HL (hearing loss) 2021    Hearing aids    Hyperlipidemia     Hypothyroidism     Irritable bowel syndrome     Constipation..ongoing    Low back pain 2022    Lower back, across hips, and down both legs    Neuropathic pain     Neuropathy     Obesity     Osteopenia     Peptic ulceration     Renal insufficiency     Rheumatoid arthritis     Scoliosis 2023    Awaiting decompression surgery    Shortness of breath on exertion     Stroke        Past Surgical History:   Procedure Laterality Date    CARDIAC CATHETERIZATION N/A 11/17/2023    Procedure: Left Heart Cath with possible angioplasty;  Surgeon: Hema English MD;  Location: [facility] CATH INVASIVE LOCATION;  Service: Cardiovascular;  Laterality: N/A;    CHOLECYSTECTOMY      EYE SURGERY      cataract surgery of both eyes     HYSTERECTOMY      KNEE ACL RECONSTRUCTION Right      LUMBAR LAMINECTOMY Left 01/12/2024    Procedure: MINIMALLY INVASIVE LUMBAR LAMINECTOMY, left approach, lumbar 3-lumbar 4;  Surgeon: Dirk Vizcaino MD;  Location: [facility] MAIN OR;  Service: Neurosurgery;  Laterality: Left;    TUBAL ABDOMINAL LIGATION           Current Outpatient Medications:     albuterol sulfate  (90 Base) MCG/ACT inhaler, Inhale 2 puffs Every 6 (Six) Hours As Needed for Wheezing., Disp: 18 g, Rfl: 5    aspirin 81 MG EC tablet, Take 1 tablet by mouth Daily., Disp: 90 tablet, Rfl: 3    atorvastatin (LIPITOR) 20 MG tablet, Take 1 tablet by mouth Daily., Disp: 90 tablet, Rfl: 3    cetirizine (zyrTEC) 10 MG tablet, Take 1 tablet by mouth Daily As Needed for Allergies., Disp: 90 tablet, Rfl: 3    estradiol (ESTRACE) 0.1 MG/GM vaginal cream, Use a pea sized amount vaginally twice weekly at hs, Disp: 42.5 g, Rfl: 12    Fluticasone-Salmeterol (ADVAIR/WIXELA) 250-50 MCG/ACT DISKUS, Inhale 1 puff 2 (Two) Times a Day., Disp: 60 each, Rfl: 11    gabapentin (NEURONTIN) 300 MG capsule, Take 1 capsule by mouth 2 (Two) Times a Day As Needed., Disp: , Rfl:     HYDROcodone-acetaminophen (NORCO)  MG per tablet, Take 1 tablet by mouth Every 6 (Six) Hours As Needed for Moderate Pain., Disp: , Rfl:     ipratropium-albuterol (DUO-NEB) 0.5-2.5 mg/3 ml nebulizer, Take 3 mL by nebulization Every 4 (Four) Hours As Needed for Wheezing or Shortness of Air., Disp: 360 mL, Rfl: 3    levothyroxine sodium (TIROSINT) 50 MCG capsule, Take 1 capsule by mouth Daily., Disp: 90 capsule, Rfl: 1    oxybutynin XL (DITROPAN-XL) 10 MG 24 hr tablet, Take 1 tablet by mouth 2 (Two) Times a Day., Disp: 180 tablet, Rfl: 1    traZODone (DESYREL) 50 MG tablet, Take 1 tablet by mouth Every Night. (Patient not taking: Reported on 5/7/2025), Disp: 90 tablet, Rfl: 0    Allergies   Allergen Reactions    Tramadol Nausea And Vomiting    Codeine Palpitations    Diazepam Anxiety    Fosfomycin Other (See Comments)     Vaginal burning      Iron  Other (See Comments)     REPORTS CAUSES HER TO HAVE URINARY TRACT INFECTION    Nickel Hives, Swelling and Rash    Prednisone Unknown - Low Severity     REPORTS MAKES HER REALLY ANGRY AND MAD    Venlafaxine Nausea And Vomiting        Family History   Problem Relation Age of Onset    Cancer Mother         Abdominal    Arthritis Mother     COPD Mother     Diabetes Mother     Heart disease Mother         Pacemaker,    Hypertension Mother     Cancer Father         Throat, prostate,  metastatic    Arthritis Father     Hypertension Father     Cancer Sister         Kidney    Cancer Maternal Grandmother         Pancreatic    Arthritis Maternal Grandmother     Diabetes Maternal Grandmother     Hypertension Maternal Grandmother         Stroke    Unknown Other     Unknown Other     Unknown Other     Unknown Other     Unknown Other     Thyroid disease Daughter             Arthritis Daughter     COPD Daughter     Diabetes Daughter     Vision loss Daughter         Glaucoma    Anxiety disorder Daughter         Anxiety-stress    Vision loss Daughter         Has glaucoma    Early death Daughter         Thyroid    Thyroid disease Daughter             Cancer Paternal Grandmother         Leukemia- non hodgkin's lymphoma    Anxiety disorder Daughter         Anxiety    Arthritis Daughter     COPD Daughter     Diabetes Daughter     Cancer Brother         Non- hodgkin's lymphoma    Hypertension Maternal Aunt         Stroke    Learning disabilities Son         ADHD       Social History     Socioeconomic History    Marital status:    Tobacco Use    Smoking status: Former     Current packs/day: 0.00     Average packs/day: 2.0 packs/day for 40.0 years (80.0 ttl pk-yrs)     Types: Cigarettes, Electronic Cigarette     Start date: 3/26/1973     Quit date: 3/26/2013     Years since quittin.1     Passive exposure: Past    Smokeless tobacco: Never    Tobacco comments:     Stopped electronic cigarettes on 2022  "  Vaping Use    Vaping status: Former    Devices: Pre-filled or refillable cartridge, Refillable tank   Substance and Sexual Activity    Alcohol use: Not Currently     Comment: Haven't drank alcohol in 25+ years    Drug use: Never    Sexual activity: Not Currently     Partners: Male     Birth control/protection: Tubal ligation, Birth control pill, Hysterectomy       Vital Signs:   Resp 14   Ht 149.9 cm (59\")   Wt 73.3 kg (161 lb 9.6 oz)   BMI 32.64 kg/m²      Physical Exam  Vitals reviewed.   Constitutional:       Appearance: Normal appearance.   Neurological:      General: No focal deficit present.      Mental Status: She is alert and oriented to person, place, and time.   Psychiatric:         Mood and Affect: Mood normal.         Behavior: Behavior normal.          Result Review :   The following data was reviewed by: ANA Tam on 05/07/2025:    Bladder Scan interpretation 05/07/2025    Estimation of residual urine via BVI 3000 Verathon Bladder Scan  MA/nurse performing: Aliza ACEVEDO MA  Residual Urine: 0 ml  Indication: Recurrent UTI    Dysuria    Genitourinary syndrome of menopause   Position: Supine  Examination: Incremental scanning of the suprapubic area using 2.0 MHz transducer using copious amounts of acoustic gel.   Findings: An anechoic area was demonstrated which represented the bladder, with measurement of residual urine as noted. I inspected this myself. In that the residual urine was stable or insignificant, refer to plan for treatment and plan necessary at this time.            Results        Procedures        Assessment and Plan    Diagnoses and all orders for this visit:    1. Recurrent UTI (Primary)  -     Bladder Scan  -     POC Urinalysis Dipstick, Automated    2. Dysuria  -     Urine Culture - Urine, Urine, Clean Catch; Future    3. Genitourinary syndrome of menopause  -     estradiol (ESTRACE) 0.1 MG/GM vaginal cream; Use a pea sized amount vaginally twice weekly at hs  " Dispense: 42.5 g; Refill: 12        Assessment & Plan  1. Urinary Tract Infection (UTI).  She reports early symptoms of a UTI but also mentions eating an orange, which might be causing the symptoms. A urine culture will be conducted to confirm the presence of any bacterial growth. She is advised to continue using over-the-counter Azo and increase fluid intake if she experiences any burning sensations. She is also advised to call if she develops any UTI symptoms before the next visit.    2. Medication Management.  She is currently using vaginal estrogen cream twice a week. Refills for the vaginal estrogen cream will be sent in to ensure she has a year's supply.    Follow-up  The patient will follow up in 1 year or sooner if necessary.      Follow Up   Return in about 1 year (around 5/7/2026).  Patient was given instructions and counseling regarding her condition or for health maintenance advice. Please see specific information pulled into the AVS if appropriate.         This document has been electronically signed by ANA Tam  May 7, 2025 09:50 EDT     Patient or patient representative verbalized consent for the use of Ambient Listening during the visit with  ANA Tam for chart documentation. 5/7/2025  09:50 EDT

## 2025-05-07 ENCOUNTER — OFFICE VISIT (OUTPATIENT)
Dept: UROLOGY | Age: 70
End: 2025-05-07
Payer: MEDICARE

## 2025-05-07 VITALS — WEIGHT: 161.6 LBS | RESPIRATION RATE: 14 BRPM | BODY MASS INDEX: 32.58 KG/M2 | HEIGHT: 59 IN

## 2025-05-07 DIAGNOSIS — R30.0 DYSURIA: ICD-10-CM

## 2025-05-07 DIAGNOSIS — N39.0 RECURRENT UTI: Primary | ICD-10-CM

## 2025-05-07 DIAGNOSIS — N95.8 GENITOURINARY SYNDROME OF MENOPAUSE: ICD-10-CM

## 2025-05-07 LAB
BILIRUB BLD-MCNC: NEGATIVE MG/DL
CLARITY, POC: CLEAR
COLOR UR: YELLOW
EXPIRATION DATE: ABNORMAL
GLUCOSE UR STRIP-MCNC: NEGATIVE MG/DL
KETONES UR QL: NEGATIVE
LEUKOCYTE EST, POC: ABNORMAL
Lab: ABNORMAL
NITRITE UR-MCNC: NEGATIVE MG/ML
PH UR: 5.5 [PH] (ref 5–8)
PROT UR STRIP-MCNC: NEGATIVE MG/DL
RBC # UR STRIP: NEGATIVE /UL
SP GR UR: 1.02 (ref 1–1.03)
URINE VOLUME: 0
UROBILINOGEN UR QL: ABNORMAL

## 2025-05-07 PROCEDURE — 87086 URINE CULTURE/COLONY COUNT: CPT | Performed by: NURSE PRACTITIONER

## 2025-05-07 PROCEDURE — 87077 CULTURE AEROBIC IDENTIFY: CPT | Performed by: NURSE PRACTITIONER

## 2025-05-07 PROCEDURE — 87186 SC STD MICRODIL/AGAR DIL: CPT | Performed by: NURSE PRACTITIONER

## 2025-05-07 RX ORDER — ESTRADIOL 0.1 MG/G
CREAM VAGINAL
Qty: 42.5 G | Refills: 12 | Status: SHIPPED | OUTPATIENT
Start: 2025-05-07

## 2025-05-09 DIAGNOSIS — N39.0 URINARY TRACT INFECTION IN FEMALE: Primary | ICD-10-CM

## 2025-05-09 LAB — BACTERIA SPEC AEROBE CULT: ABNORMAL

## 2025-05-09 RX ORDER — NITROFURANTOIN 25; 75 MG/1; MG/1
100 CAPSULE ORAL 2 TIMES DAILY
Qty: 14 CAPSULE | Refills: 0 | Status: SHIPPED | OUTPATIENT
Start: 2025-05-09 | End: 2025-05-16

## 2025-05-12 ENCOUNTER — TRANSCRIBE ORDERS (OUTPATIENT)
Dept: ADMINISTRATIVE | Facility: HOSPITAL | Age: 70
End: 2025-05-12
Payer: MEDICARE

## 2025-05-12 DIAGNOSIS — Z12.31 BREAST CANCER SCREENING BY MAMMOGRAM: Primary | ICD-10-CM

## 2025-05-22 ENCOUNTER — TELEPHONE (OUTPATIENT)
Dept: FAMILY MEDICINE CLINIC | Facility: CLINIC | Age: 70
End: 2025-05-22
Payer: MEDICARE

## 2025-05-22 NOTE — TELEPHONE ENCOUNTER
Patient dropped off an application for a disabled license plate. She would like a call back when it's filled out.

## 2025-07-31 ENCOUNTER — HOSPITAL ENCOUNTER (OUTPATIENT)
Dept: MAMMOGRAPHY | Facility: HOSPITAL | Age: 70
Discharge: HOME | End: 2025-07-31
Admitting: PHYSICIAN ASSISTANT
Payer: MEDICARE

## 2025-07-31 ENCOUNTER — OFFICE VISIT (OUTPATIENT)
Dept: GASTROENTEROLOGY | Facility: CLINIC | Age: 70
End: 2025-07-31
Payer: MEDICARE

## 2025-07-31 VITALS
HEIGHT: 59 IN | OXYGEN SATURATION: 96 % | SYSTOLIC BLOOD PRESSURE: 143 MMHG | WEIGHT: 160.8 LBS | HEART RATE: 61 BPM | BODY MASS INDEX: 32.42 KG/M2 | DIASTOLIC BLOOD PRESSURE: 57 MMHG

## 2025-07-31 DIAGNOSIS — Z12.31 BREAST CANCER SCREENING BY MAMMOGRAM: ICD-10-CM

## 2025-07-31 DIAGNOSIS — Z12.11 ENCOUNTER FOR SCREENING FOR MALIGNANT NEOPLASM OF COLON: ICD-10-CM

## 2025-07-31 DIAGNOSIS — Z80.0 FAMILY HISTORY OF COLON CANCER: ICD-10-CM

## 2025-07-31 DIAGNOSIS — K59.00 CONSTIPATION, UNSPECIFIED CONSTIPATION TYPE: Primary | ICD-10-CM

## 2025-07-31 PROCEDURE — 77063 BREAST TOMOSYNTHESIS BI: CPT

## 2025-07-31 PROCEDURE — 77067 SCR MAMMO BI INCL CAD: CPT

## 2025-07-31 RX ORDER — POLYETHYLENE GLYCOL 3350, SODIUM CHLORIDE, SODIUM BICARBONATE, POTASSIUM CHLORIDE 420; 11.2; 5.72; 1.48 G/4L; G/4L; G/4L; G/4L
4000 POWDER, FOR SOLUTION ORAL ONCE
Qty: 4000 ML | Refills: 0 | Status: SHIPPED | OUTPATIENT
Start: 2025-07-31 | End: 2025-07-31

## 2025-07-31 NOTE — PROGRESS NOTES
Chief Complaint  NEW PATIENT (Constipation, has used miralax and stool softeners. Has a bowel movement about every week to a week and a half. Brother recently diagnosed with colon cancer earlier this year. )    Veda Peña is a 70 y.o. female who presents to [facility] on referral from Lina Nguyen, * for a gastroenterology evaluation of constipation.      History of Present Illness  New patient presents to the office for constipation. Patient has a bowel movement about 1 time a week to week and a half. She has struggled with constipation all of her life. Currently using miralax and stool softener with minimal relief. She takes pain medication BID for chronic back pain and arthritis. Denies melena and hematochezia. Denies unintentional weight loss.     Pertinent family history of colon cancer in brother.     Last Colonoscopy 11/19/2018 by Dr. Hernandez - normal.     Past Medical History:   Diagnosis Date    Anemia 2021    Anxiety 2015    Arthritis     Asthma 2021    Emphysema    Cancer 2021-present    Skin    Cataract 2020    Cataract surgery in both eyes    Cholelithiasis     Surgery    Chronic bronchitis     CKD (chronic kidney disease)     FOLLOWED BY PCP LAST BUN 16, CREAT 1 AND GFR 61.5 NOV 2023    Colon polyp     Colonoscopy, removed and biopsied    COPD (chronic obstructive pulmonary disease)     Coronary artery disease     Depression     Diabetes mellitus 2023    Pre- diabetes    Elevated glucose 04/14/2021    Emphysema of lung     Fibroid 1995?    Complete abdominal hysterectomy    Fibromyalgia, primary     ?    GERD (gastroesophageal reflux disease)     Headache 2020    Hernia     Hiatal hernia    History of 2019 novel coronavirus disease (COVID-19) 04/14/2021    HL (hearing loss) 2021    Hearing aids    Hyperlipidemia     Hypothyroidism     Irritable bowel syndrome     Constipation..ongoing    Low back pain 2022    Lower back, across hips, and down both  legs    Neuropathic pain     Neuropathy     Obesity     Osteopenia     Peptic ulceration     Renal insufficiency     Rheumatoid arthritis     Scoliosis 2023    Awaiting decompression surgery    Shortness of breath on exertion     Stroke     Urinary incontinence     Urinary tract infection        Past Surgical History:   Procedure Laterality Date    ABDOMINAL SURGERY      CARDIAC CATHETERIZATION N/A 11/17/2023    Procedure: Left Heart Cath with possible angioplasty;  Surgeon: Hema English MD;  Location: [facility] CATH INVASIVE LOCATION;  Service: Cardiovascular;  Laterality: N/A;    CHOLECYSTECTOMY      COLONOSCOPY      EYE SURGERY      cataract surgery of both eyes     HYSTERECTOMY      KNEE ACL RECONSTRUCTION Right     LUMBAR LAMINECTOMY Left 01/12/2024    Procedure: MINIMALLY INVASIVE LUMBAR LAMINECTOMY, left approach, lumbar 3-lumbar 4;  Surgeon: Dirk Vizcaino MD;  Location: [facility] MAIN OR;  Service: Neurosurgery;  Laterality: Left;    SPINE SURGERY  2023    Decompression    TUBAL ABDOMINAL LIGATION      UPPER GASTROINTESTINAL ENDOSCOPY           Current Outpatient Medications:     albuterol sulfate  (90 Base) MCG/ACT inhaler, Inhale 2 puffs Every 6 (Six) Hours As Needed for Wheezing., Disp: 18 g, Rfl: 5    aspirin 81 MG EC tablet, Take 1 tablet by mouth Daily., Disp: 90 tablet, Rfl: 3    atorvastatin (LIPITOR) 20 MG tablet, Take 1 tablet by mouth Daily., Disp: 90 tablet, Rfl: 3    cetirizine (zyrTEC) 10 MG tablet, Take 1 tablet by mouth Daily As Needed for Allergies., Disp: 90 tablet, Rfl: 3    estradiol (ESTRACE) 0.1 MG/GM vaginal cream, Use a pea sized amount vaginally twice weekly at hs, Disp: 42.5 g, Rfl: 12    Fluticasone-Salmeterol (ADVAIR/WIXELA) 250-50 MCG/ACT DISKUS, Inhale 1 puff 2 (Two) Times a Day., Disp: 60 each, Rfl: 11    gabapentin (NEURONTIN) 300 MG capsule, Take 1 capsule by mouth 2 (Two) Times a Day As Needed., Disp: , Rfl:     HYDROcodone-acetaminophen (NORCO)  MG per tablet,  Take 1 tablet by mouth Every 6 (Six) Hours As Needed for Moderate Pain., Disp: , Rfl:     ipratropium-albuterol (DUO-NEB) 0.5-2.5 mg/3 ml nebulizer, Take 3 mL by nebulization Every 4 (Four) Hours As Needed for Wheezing or Shortness of Air., Disp: 360 mL, Rfl: 3    levothyroxine sodium (TIROSINT) 50 MCG capsule, Take 1 capsule by mouth Daily., Disp: 90 capsule, Rfl: 1    oxybutynin XL (DITROPAN-XL) 10 MG 24 hr tablet, Take 1 tablet by mouth 2 (Two) Times a Day., Disp: 180 tablet, Rfl: 1    linaclotide (Linzess) 145 MCG capsule capsule, Take 1 capsule by mouth Every Morning Before Breakfast., Disp: 30 capsule, Rfl: 5    polyethylene glycol-electrolytes (NULYTELY) 420 g solution, Take 4,000 mL by mouth 1 (One) Time for 1 dose., Disp: 4000 mL, Rfl: 0     Allergies   Allergen Reactions    Tramadol Nausea And Vomiting    Codeine Palpitations    Diazepam Anxiety    Fosfomycin Other (See Comments)     Vaginal burning      Iron Other (See Comments)     REPORTS CAUSES HER TO HAVE URINARY TRACT INFECTION    Nickel Hives, Swelling and Rash    Prednisone Unknown - Low Severity     REPORTS MAKES HER REALLY ANGRY AND MAD    Venlafaxine Nausea And Vomiting       Family History   Problem Relation Age of Onset    Cancer Mother         Abdominal    Arthritis Mother     COPD Mother     Diabetes Mother     Heart disease Mother         Pacemaker,    Hypertension Mother     Clotting disorder Mother         Brain aneurysm    Cancer Father         Throat, prostate,  metastatic    Arthritis Father     Hypertension Father     Cirrhosis Father     Esophageal cancer Father     Heart disease Father     Heart attack Father     Cancer Sister         Kidney    Rheumatologic disease Sister     Cancer Maternal Grandmother         Pancreatic    Arthritis Maternal Grandmother     Diabetes Maternal Grandmother     Hypertension Maternal Grandmother         Stroke    Pancreatitis Maternal Grandmother          from pancreatic cancer    Clotting  "disorder Maternal Grandmother         Stroke    Unknown Other     Unknown Other     Unknown Other     Unknown Other     Unknown Other     Thyroid disease Daughter             Arthritis Daughter     COPD Daughter     Diabetes Daughter     Vision loss Daughter         Glaucoma    Anxiety disorder Daughter         Anxiety-stress    Vision loss Daughter         Has glaucoma    Early death Daughter         Thyroid    Thyroid disease Daughter             Cancer Paternal Grandmother         Leukemia- non hodgkin's lymphoma    Anemia Paternal Grandmother     Anxiety disorder Daughter         Anxiety    Arthritis Daughter     COPD Daughter     Diabetes Daughter     Cancer Brother         Non- hodgkin's lymphoma- stage 3 colon cancer    Colon cancer Brother         Found early , surgery,  chemo, in remission    Ulcerative colitis Brother     Hypertension Maternal Aunt         Stroke    Learning disabilities Son         ADHD    Clotting disorder Maternal Grandfather         Perforated ulcers,  stroke    Cancer Brother         Non hodgkin's lymphoma    Learning disabilities Son         ADHD    Diabetes Son     Clotting disorder Maternal Aunt         Stroke    Hypertension Maternal Aunt         Stroke        Social History     Social History Narrative    Does PT exercises           Immunization:  Immunization History   Administered Date(s) Administered    Tdap 2020        Objective     Vital Signs:   /57 (BP Location: Left arm, Patient Position: Sitting, Cuff Size: Adult)   Pulse 61   Ht 149.9 cm (59\")   Wt 72.9 kg (160 lb 12.8 oz)   SpO2 96%   BMI 32.48 kg/m²       Physical Exam    Result Review :     CBC w/diff          2024    10:42 2025    10:49   CBC w/Diff   WBC 5.50  7.00    RBC 5.14  4.70    Hemoglobin 12.6  11.4    Hematocrit 39.8  36.8    MCV 77.4  78.3    MCH 24.5  24.3    MCHC 31.7  31.0    RDW 14.8  14.8    Platelets 248  257    Neutrophil Rel % 43.9  46.8  "   Immature Granulocyte Rel % 0.4  0.7    Lymphocyte Rel % 42.4  41.9    Monocyte Rel % 12.4  7.0    Eosinophil Rel % 0.4  2.9    Basophil Rel % 0.5  0.7      CMP          12/30/2024    10:42 4/24/2025    10:49   CMP   Glucose 107  87    BUN 14  12    Creatinine 1.11  0.91    EGFR 53.9  68.4    Sodium 139  141    Potassium 4.2  4.9    Chloride 106  107    Calcium 9.2  9.5    Total Protein 7.7  7.2    Albumin 4.1  4.2    Globulin 3.6  3.0    Total Bilirubin 0.4  0.5    Alkaline Phosphatase 75  82    AST (SGOT) 19  22    ALT (SGPT) 10  13    Albumin/Globulin Ratio 1.1  1.4    BUN/Creatinine Ratio 12.6  13.2    Anion Gap 10.7  8.6              Assessment and Plan    Diagnoses and all orders for this visit:    1. Constipation, unspecified constipation type (Primary)    2. Family history of colon cancer  -     Case Request; Standing  -     Follow Anesthesia Guidelines / Protocol; Standing  -     Follow Anesthesia Guidelines / Protocol; Future  -     Verify NPO; Standing  -     Obtain Informed Consent; Standing  -     Case Request    3. Encounter for screening for malignant neoplasm of colon  -     Case Request; Standing  -     Follow Anesthesia Guidelines / Protocol; Standing  -     Follow Anesthesia Guidelines / Protocol; Future  -     Verify NPO; Standing  -     Obtain Informed Consent; Standing  -     Case Request    Other orders  -     polyethylene glycol-electrolytes (NULYTELY) 420 g solution; Take 4,000 mL by mouth 1 (One) Time for 1 dose.  Dispense: 4000 mL; Refill: 0  -     linaclotide (Linzess) 145 MCG capsule capsule; Take 1 capsule by mouth Every Morning Before Breakfast.  Dispense: 30 capsule; Refill: 5    Trial Linzess 145, samples provided  Cardiac clearance from Dr. English. Pulmonary clearance from ANA Martin  COLONOSCOPY Surgical Risk and Benefits: Possible risk/complications, benefits, and alternatives to surgical or invasive procedure have been explained to patient and/or legal guardian. Risks  include bleeding, infection, and perforation. Patient has been evaluated and can tolerate anesthesia and/or sedation. Risk, benefits, and alternatives to anesthesia and sedation have been explained to patient and/or legal guardian.     Follow Up   No follow-ups on file.  Patient was given instructions and counseling regarding her condition or for health maintenance advice. Please see specific information pulled into the AVS if appropriate.

## 2025-08-01 ENCOUNTER — PATIENT ROUNDING (BHMG ONLY) (OUTPATIENT)
Dept: GASTROENTEROLOGY | Facility: CLINIC | Age: 70
End: 2025-08-01
Payer: MEDICARE

## 2025-08-01 NOTE — PROGRESS NOTES
A My-Chart message has been sent to the patient for PATIENT ROUNDING with [facility].

## 2025-08-13 ENCOUNTER — TELEPHONE (OUTPATIENT)
Dept: UROLOGY | Age: 70
End: 2025-08-13
Payer: MEDICARE

## 2025-08-13 DIAGNOSIS — R30.0 DYSURIA: Primary | ICD-10-CM

## 2025-08-14 ENCOUNTER — TELEPHONE (OUTPATIENT)
Dept: GASTROENTEROLOGY | Facility: CLINIC | Age: 70
End: 2025-08-14
Payer: MEDICARE

## 2025-08-14 ENCOUNTER — LAB (OUTPATIENT)
Facility: HOSPITAL | Age: 70
End: 2025-08-14
Payer: MEDICARE

## 2025-08-14 DIAGNOSIS — R30.0 DYSURIA: ICD-10-CM

## 2025-08-14 PROCEDURE — 87186 SC STD MICRODIL/AGAR DIL: CPT

## 2025-08-14 PROCEDURE — 87086 URINE CULTURE/COLONY COUNT: CPT

## 2025-08-14 PROCEDURE — 87077 CULTURE AEROBIC IDENTIFY: CPT

## 2025-08-16 LAB — BACTERIA SPEC AEROBE CULT: ABNORMAL

## 2025-08-18 ENCOUNTER — RESULTS FOLLOW-UP (OUTPATIENT)
Dept: UROLOGY | Age: 70
End: 2025-08-18
Payer: MEDICARE

## 2025-08-18 DIAGNOSIS — N39.0 ACUTE UTI: Primary | ICD-10-CM

## 2025-08-18 RX ORDER — NITROFURANTOIN 25; 75 MG/1; MG/1
100 CAPSULE ORAL 2 TIMES DAILY
Qty: 14 CAPSULE | Refills: 0 | Status: SHIPPED | OUTPATIENT
Start: 2025-08-18 | End: 2025-08-25

## 2025-08-19 ENCOUNTER — OFFICE VISIT (OUTPATIENT)
Dept: PULMONOLOGY | Facility: CLINIC | Age: 70
End: 2025-08-19
Payer: MEDICARE

## 2025-08-19 VITALS
HEIGHT: 59 IN | SYSTOLIC BLOOD PRESSURE: 139 MMHG | BODY MASS INDEX: 32.25 KG/M2 | DIASTOLIC BLOOD PRESSURE: 75 MMHG | RESPIRATION RATE: 16 BRPM | OXYGEN SATURATION: 95 % | TEMPERATURE: 97.6 F | HEART RATE: 64 BPM | WEIGHT: 160 LBS

## 2025-08-19 DIAGNOSIS — R06.09 DYSPNEA ON EXERTION: ICD-10-CM

## 2025-08-19 DIAGNOSIS — J43.9 PULMONARY EMPHYSEMA, UNSPECIFIED EMPHYSEMA TYPE: Primary | ICD-10-CM

## 2025-08-19 DIAGNOSIS — F17.211 NICOTINE DEPENDENCE, CIGARETTES, IN REMISSION: ICD-10-CM

## 2025-08-19 PROCEDURE — 99214 OFFICE O/P EST MOD 30 MIN: CPT | Performed by: NURSE PRACTITIONER

## 2025-08-19 PROCEDURE — 1159F MED LIST DOCD IN RCRD: CPT | Performed by: NURSE PRACTITIONER

## 2025-08-19 PROCEDURE — 1160F RVW MEDS BY RX/DR IN RCRD: CPT | Performed by: NURSE PRACTITIONER

## 2025-08-19 RX ORDER — POLYETHYLENE GLYCOL-3350, SODIUM CHLORIDE, POTASSIUM CHLORIDE AND SODIUM BICARBONATE 420; 11.2; 5.72; 1.48 G/438.4G; G/438.4G; G/438.4G; G/438.4G
POWDER, FOR SOLUTION ORAL
COMMUNITY
Start: 2025-07-31

## 2025-08-26 ENCOUNTER — ANESTHESIA EVENT (OUTPATIENT)
Dept: GASTROENTEROLOGY | Facility: HOSPITAL | Age: 70
End: 2025-08-26
Payer: MEDICARE

## 2025-08-27 ENCOUNTER — SPECIALTY PHARMACY (OUTPATIENT)
Dept: OTHER | Facility: HOSPITAL | Age: 70
End: 2025-08-27
Payer: MEDICARE

## 2025-08-27 ENCOUNTER — HOSPITAL ENCOUNTER (OUTPATIENT)
Facility: HOSPITAL | Age: 70
Setting detail: HOSPITAL OUTPATIENT SURGERY
Discharge: HOME | End: 2025-08-27
Attending: INTERNAL MEDICINE | Admitting: INTERNAL MEDICINE
Payer: MEDICARE

## 2025-08-27 ENCOUNTER — ANESTHESIA (OUTPATIENT)
Dept: GASTROENTEROLOGY | Facility: HOSPITAL | Age: 70
End: 2025-08-27
Payer: MEDICARE

## 2025-08-27 ENCOUNTER — RESULTS FOLLOW-UP (OUTPATIENT)
Dept: GASTROENTEROLOGY | Facility: HOSPITAL | Age: 70
End: 2025-08-27
Payer: MEDICARE

## 2025-08-27 ENCOUNTER — SPECIALTY PHARMACY (OUTPATIENT)
Dept: GASTROENTEROLOGY | Facility: CLINIC | Age: 70
End: 2025-08-27
Payer: MEDICARE

## 2025-08-27 VITALS
SYSTOLIC BLOOD PRESSURE: 141 MMHG | HEART RATE: 57 BPM | DIASTOLIC BLOOD PRESSURE: 57 MMHG | TEMPERATURE: 98.1 F | RESPIRATION RATE: 18 BRPM | WEIGHT: 160.5 LBS | OXYGEN SATURATION: 100 % | BODY MASS INDEX: 32.42 KG/M2

## 2025-08-27 DIAGNOSIS — Z12.11 ENCOUNTER FOR SCREENING FOR MALIGNANT NEOPLASM OF COLON: ICD-10-CM

## 2025-08-27 DIAGNOSIS — Z80.0 FAMILY HISTORY OF COLON CANCER: ICD-10-CM

## 2025-08-27 LAB
027 TOXIN: ABNORMAL
C DIFF GDH + TOXINS A+B STL QL IA.RAPID: NEGATIVE
C DIFF TOX GENS STL QL NAA+PROBE: POSITIVE
QT INTERVAL: 428 MS
QTC INTERVAL: 435 MS

## 2025-08-27 PROCEDURE — 87493 C DIFF AMPLIFIED PROBE: CPT | Performed by: INTERNAL MEDICINE

## 2025-08-27 PROCEDURE — 93005 ELECTROCARDIOGRAM TRACING: CPT | Performed by: NURSE ANESTHETIST, CERTIFIED REGISTERED

## 2025-08-27 PROCEDURE — 25810000003 LACTATED RINGERS PER 1000 ML: Performed by: NURSE ANESTHETIST, CERTIFIED REGISTERED

## 2025-08-27 PROCEDURE — 25010000002 LIDOCAINE PF 2% 2 % SOLUTION: Performed by: NURSE ANESTHETIST, CERTIFIED REGISTERED

## 2025-08-27 PROCEDURE — 88305 TISSUE EXAM BY PATHOLOGIST: CPT | Performed by: INTERNAL MEDICINE

## 2025-08-27 PROCEDURE — 87449 NOS EACH ORGANISM AG IA: CPT | Performed by: INTERNAL MEDICINE

## 2025-08-27 PROCEDURE — 25010000002 PROPOFOL 10 MG/ML EMULSION: Performed by: NURSE ANESTHETIST, CERTIFIED REGISTERED

## 2025-08-27 RX ORDER — SODIUM CHLORIDE, SODIUM LACTATE, POTASSIUM CHLORIDE, CALCIUM CHLORIDE 600; 310; 30; 20 MG/100ML; MG/100ML; MG/100ML; MG/100ML
30 INJECTION, SOLUTION INTRAVENOUS CONTINUOUS
Status: DISCONTINUED | OUTPATIENT
Start: 2025-08-27 | End: 2025-08-27 | Stop reason: HOSPADM

## 2025-08-27 RX ORDER — LIDOCAINE HYDROCHLORIDE 20 MG/ML
INJECTION, SOLUTION EPIDURAL; INFILTRATION; INTRACAUDAL; PERINEURAL AS NEEDED
Status: DISCONTINUED | OUTPATIENT
Start: 2025-08-27 | End: 2025-08-27 | Stop reason: SURG

## 2025-08-27 RX ORDER — PROPOFOL 10 MG/ML
VIAL (ML) INTRAVENOUS AS NEEDED
Status: DISCONTINUED | OUTPATIENT
Start: 2025-08-27 | End: 2025-08-27 | Stop reason: SURG

## 2025-08-27 RX ORDER — FIDAXOMICIN 200 MG/1
200 TABLET, FILM COATED ORAL 2 TIMES DAILY
Qty: 20 TABLET | Refills: 0 | Status: SHIPPED | OUTPATIENT
Start: 2025-08-27

## 2025-08-27 RX ADMIN — PROPOFOL 50 MG: 10 INJECTION, EMULSION INTRAVENOUS at 12:10

## 2025-08-27 RX ADMIN — PROPOFOL 150 MCG/KG/MIN: 10 INJECTION, EMULSION INTRAVENOUS at 12:00

## 2025-08-27 RX ADMIN — SODIUM CHLORIDE, POTASSIUM CHLORIDE, SODIUM LACTATE AND CALCIUM CHLORIDE 30 ML/HR: 600; 310; 30; 20 INJECTION, SOLUTION INTRAVENOUS at 11:22

## 2025-08-27 RX ADMIN — PROPOFOL 50 MG: 10 INJECTION, EMULSION INTRAVENOUS at 12:00

## 2025-08-27 RX ADMIN — LIDOCAINE HYDROCHLORIDE 50 MG: 20 INJECTION, SOLUTION EPIDURAL; INFILTRATION; INTRACAUDAL; PERINEURAL at 12:00

## 2025-08-28 LAB
CYTO UR: NORMAL
LAB AP CASE REPORT: NORMAL
LAB AP CLINICAL INFORMATION: NORMAL
PATH REPORT.FINAL DX SPEC: NORMAL
PATH REPORT.GROSS SPEC: NORMAL

## (undated) DEVICE — STERILE POLYISOPRENE POWDER-FREE SURGICAL GLOVES WITH EMOLLIENT COATING: Brand: PROTEXIS

## (undated) DEVICE — PENCL E/S HNDSWCH ROCKR CB

## (undated) DEVICE — ANTIBACTERIAL UNDYED BRAIDED (POLYGLACTIN 910), SYNTHETIC ABSORBABLE SUTURE: Brand: COATED VICRYL

## (undated) DEVICE — Device

## (undated) DEVICE — SLV SCD KN/LEN ADJ EXPRSS BLENDED MD 1P/U

## (undated) DEVICE — STERILE POLYISOPRENE POWDER-FREE SURGICAL GLOVES: Brand: PROTEXIS

## (undated) DEVICE — SOL IRRG H2O PL/BG 1000ML STRL

## (undated) DEVICE — DRP MICROSCP LECIA W/CLEARLENS 137X381CM

## (undated) DEVICE — CATH F4 INF 3DRC 100CM: Brand: INFINITI

## (undated) DEVICE — SOLIDIFIER LIQLOC PLS 1500CC BT

## (undated) DEVICE — SUT VIC 0/0 UR6 27IN DYED J603H

## (undated) DEVICE — LAMINECTOMY CERVICAL DISC-LF: Brand: MEDLINE INDUSTRIES, INC.

## (undated) DEVICE — THE STERILE LIGHT HANDLE COVER IS USED WITH STERIS SURGICAL LIGHTING AND VISUALIZATION SYSTEMS.

## (undated) DEVICE — SNAR E/S POLYP SNAREMASTER OVL/10MM 2.8X2300MM YEL

## (undated) DEVICE — GLV SURG BIOGEL LTX PF 7 1/2

## (undated) DEVICE — GLIDESHEATH SLENDER STAINLESS STEEL KIT: Brand: GLIDESHEATH SLENDER

## (undated) DEVICE — GW INQW FIX/CORE PTFE J/3MM .035 260CM

## (undated) DEVICE — TR BAND RADIAL ARTERY COMPRESSION DEVICE: Brand: TR BAND

## (undated) DEVICE — GAMMEX® NON-LATEX SIZE 7.5, STERILE NEOPRENE POWDER-FREE SURGICAL GLOVE: Brand: GAMMEX

## (undated) DEVICE — SINGLE-USE BIOPSY FORCEPS: Brand: RADIAL JAW 4

## (undated) DEVICE — DEFENDO AIR WATER SUCTION AND BIOPSY VALVE KIT: Brand: DEFENDO AIR/WATER/SUCTION AND BIOPSY VALVE

## (undated) DEVICE — RADIFOCUS OPTITORQUE ANGIOGRAPHIC CATHETER: Brand: OPTITORQUE

## (undated) DEVICE — THE SINGLE USE ETRAP – POLYP TRAP IS USED FOR SUCTION RETRIEVAL OF ENDOSCOPICALLY REMOVED POLYPS.: Brand: ETRAP